# Patient Record
Sex: FEMALE | Race: BLACK OR AFRICAN AMERICAN | NOT HISPANIC OR LATINO | Employment: OTHER | URBAN - METROPOLITAN AREA
[De-identification: names, ages, dates, MRNs, and addresses within clinical notes are randomized per-mention and may not be internally consistent; named-entity substitution may affect disease eponyms.]

---

## 2017-01-03 ENCOUNTER — HOSPITAL ENCOUNTER (OUTPATIENT)
Dept: RADIOLOGY | Facility: HOSPITAL | Age: 77
Discharge: HOME | End: 2017-01-03
Attending: ORTHOPAEDIC SURGERY
Payer: MEDICARE

## 2017-01-03 DIAGNOSIS — G89.29 CHRONIC PAIN OF LEFT ANKLE: ICD-10-CM

## 2017-01-03 DIAGNOSIS — M76.822 POSTERIOR TIBIAL TENDINITIS, LEFT: ICD-10-CM

## 2017-01-03 DIAGNOSIS — M25.572 CHRONIC PAIN OF LEFT ANKLE: ICD-10-CM

## 2017-01-03 DIAGNOSIS — M19.072 PRIMARY OSTEOARTHRITIS OF LEFT FOOT: ICD-10-CM

## 2017-01-03 PROCEDURE — 73718 MRI LOWER EXTREMITY W/O DYE: CPT | Mod: TC,PO,LT

## 2017-01-03 PROCEDURE — 73721 MRI JNT OF LWR EXTRE W/O DYE: CPT | Mod: TC,PO,LT

## 2017-01-03 PROCEDURE — 73721 MRI JNT OF LWR EXTRE W/O DYE: CPT | Mod: 26,LT,, | Performed by: RADIOLOGY

## 2017-01-03 PROCEDURE — 73718 MRI LOWER EXTREMITY W/O DYE: CPT | Mod: 26,LT,, | Performed by: RADIOLOGY

## 2017-01-05 ENCOUNTER — OFFICE VISIT (OUTPATIENT)
Dept: PODIATRY | Facility: CLINIC | Age: 77
End: 2017-01-05
Payer: MEDICARE

## 2017-01-05 VITALS — WEIGHT: 203.06 LBS | DIASTOLIC BLOOD PRESSURE: 74 MMHG | HEART RATE: 74 BPM | SYSTOLIC BLOOD PRESSURE: 136 MMHG

## 2017-01-05 DIAGNOSIS — B35.9 DERMATOPHYTOSIS: ICD-10-CM

## 2017-01-05 DIAGNOSIS — E11.9 COMPREHENSIVE DIABETIC FOOT EXAMINATION, TYPE 2 DM, ENCOUNTER FOR: ICD-10-CM

## 2017-01-05 DIAGNOSIS — S82.839A AVULSION FRACTURE OF DISTAL FIBULA: ICD-10-CM

## 2017-01-05 DIAGNOSIS — M76.822 POSTERIOR TIBIAL TENDONITIS, LEFT: Primary | ICD-10-CM

## 2017-01-05 DIAGNOSIS — I77.1 ARTERIAL INSUFFICIENCY: ICD-10-CM

## 2017-01-05 PROCEDURE — 99203 OFFICE O/P NEW LOW 30 MIN: CPT | Mod: 25,S$PBB,, | Performed by: PODIATRIST

## 2017-01-05 PROCEDURE — 99214 OFFICE O/P EST MOD 30 MIN: CPT | Mod: PBBFAC,PO | Performed by: PODIATRIST

## 2017-01-05 PROCEDURE — 99999 PR PBB SHADOW E&M-EST. PATIENT-LVL IV: CPT | Mod: PBBFAC,,, | Performed by: PODIATRIST

## 2017-01-05 PROCEDURE — 11721 DEBRIDE NAIL 6 OR MORE: CPT | Mod: Q8,PBBFAC,PO | Performed by: PODIATRIST

## 2017-01-05 PROCEDURE — 11721 DEBRIDE NAIL 6 OR MORE: CPT | Mod: Q8,S$PBB,, | Performed by: PODIATRIST

## 2017-01-05 NOTE — MR AVS SNAPSHOT
[facility] - [facility]  [address] [place] [address]  Phone: [phone]  Fax: [phone]                  Jennifer Zhu   2017 3:00 PM   Office Visit    Description:  Female : 1940   Provider:  Selena Bingham DPM   Department:  [facility] - Podiatry           Reason for Visit     Ankle Pain           Diagnoses this Visit        Comments    Posterior tibial tendonitis, left    -  Primary     Avulsion fracture of distal fibula                To Do List           Goals (5 Years of Data)     None      [facility]     [facility] -  Assistance  Registered nurses in the [facility] provide clinical advisement, health education, appointment booking, and other advisory services.  Call for this free service at [phone].             Medications           Message regarding Medications     Verify the changes and/or additions to your medication regime listed below are the same as discussed with your clinician today.  If any of these changes or additions are incorrect, please notify your healthcare provider.             Verify that the below list of medications is an accurate representation of the medications you are currently taking.  If none reported, the list may be blank. If incorrect, please contact your healthcare provider. Carry this list with you in case of emergency.           Current Medications     amlodipine (NORVASC) 5 MG tablet Take 5 mg by mouth.    aspirin 325 MG tablet Take 325 mg by mouth.    blood sugar diagnostic (ONETOUCH ULTRA TEST) Strp 1 boxes by Misc.(Non-Drug; Combo Route) route daily.    clopidogrel (PLAVIX) 75 mg tablet Take 75 mg by mouth.    diclofenac sodium (VOLTAREN) 1 % Gel Apply 2 g topically once daily.    ESTRACE 0.01 % (0.1 mg/gram) vaginal cream USE AS DIRECTED VAGINALLY ONE TO TWO TIMES PER WEEK AS DIRECTED    fluticasone (FLONASE) 50 mcg/actuation nasal spray SHAKE WELL USE 1 SQUIRT IN EACH NOSTRIL ONCE DAILY FOR NASAL  ALLERGY SYMPTOMS    furosemide (LASIX) 20 MG tablet Take 1-2 tablet by mouth  per day for fluid and blood pressure    lancets (ONETOUCH ULTRASOFT LANCETS) Misc Check BS bid    loratadine (CLARITIN) 10 mg tablet Take 10 mg by mouth.    metformin (GLUCOPHAGE) 500 MG tablet Take 500 mg by mouth.    metoprolol succinate (TOPROL-XL) 25 MG 24 hr tablet Take 1/2 tab po Bid.    nitroGLYCERIN (NITROSTAT) 0.4 MG SL tablet Place 0.4 mg under the tongue.    omeprazole (PRILOSEC) 20 MG capsule Take 20 mg by mouth.    peg 400-propylene glycol, PF, 0.4-0.3 % Dpet Apply 1 drop to eye.    polyethylene glycol (GLYCOLAX) 17 gram PwPk Take 17 g by mouth.    potassium chloride (KLOR-CON) 10 MEQ TbSR TAKE ONE (1) TABLET BY MOUTH TWICE A DAY WITH A FULL GLASS OF WATER FOR POTASSIUM    simvastatin (ZOCOR) 20 MG tablet Take 20 mg by mouth.    travoprost (TRAVATAN Z) 0.004 % Drop     travoprost, benzalkonium, (TRAVATAN) 0.004 % ophthalmic solution Apply 1 drop to eye.    triamcinolone acetonide 0.1% (KENALOG) 0.1 % ointment Apply 1 applicator topically.           Clinical Reference Information           Vital Signs - Last Recorded  Most recent update: 1/5/2017  3:25 PM by Yumi Youngblood MA    BP Pulse Wt             136/74 (BP Location: Left arm, Patient Position: Sitting, BP Method: Automatic) 74 92.1 kg (203 lb 0.7 oz)         Blood Pressure          Most Recent Value    BP  136/74      Allergies as of 1/5/2017     Lisinopril    Penicillins    Sulfa (Sulfonamide Antibiotics)      Immunizations Administered on Date of Encounter - 1/5/2017     None      Orders Placed During Today's Visit      Normal Orders This Visit    Ambulatory Referral to Physical/Occupational Therapy

## 2017-01-05 NOTE — LETTER
January 5, 2017      Terrie Escobar PA-C  [address] [place] [address]           [facility] - Podiatry  [address] [place] [address]  Phone: [phone]  Fax: [phone]          Patient: Jennifer Zhu   MR Number: [id number]   YOB: 1940   Date of Visit: 1/5/2017       Dear Terrie Ecsobar:    Thank you for referring Jennifer Zhu to me for evaluation. Attached you will find relevant portions of my assessment and plan of care.    If you have questions, please do not hesitate to call me. I look forward to following Jennifer Zhu along with you.    Sincerely,    Selena Bingham DPM    Enclosure  CC:  No Recipients    If you would like to receive this communication electronically, please contact [email or web address] or [phone] to request more information on [facility] access.    For providers and/or their staff who would like to refer a patient to [facility], please contact us through our one-stop-shop provider referral line, [facility], at [phone].    If you feel you have received this communication in error or would no longer like to receive these types of communications, please e-mail [email or web address]

## 2017-03-13 NOTE — PROGRESS NOTES
PODIATRY NOTE  PCP: Dr. Rod Saez MD    CHIEF COMPLAINT   Chief Complaint   Patient presents with    Ankle Pain     left ankle and foot pain with swelling MRI Review xrays prior       HPI  Jennifer Zhu is a 76 y.o. female who has a past medical history of Cataract; Diabetes mellitus, type 2; Hyperlipidemia; and Hypertension.   Jennifer presents to clinic today complaining of left ankle pain. Pt was seen by orthopedic department and MRI and foot xray ordered consistent with tendonitis. She states she still has some pain at the site however topical antiinflammatory gel has been helping. She is ambulatory with use of cane. She rates pain 7/10 at worst. She normally wears SAS shoes. She has purchased recommend orthotics. She states symptoms have been present since she had a fall in October 2016 resulting in avulsion fracture of lateral malleolus. Her pain is intermittent but is improved with use of functional orthoses/.    She has compression stockings and [facility] She also complains about thickened elongated toenails. She is a controlled DM2 and states routine at risk foot care was performed by another podiatrists. She request nail carae.     Patient denies other pedal complaints at this time.      PMH  Past Medical History   Diagnosis Date    Cataract     Diabetes mellitus, type 2     Hyperlipidemia     Hypertension        PROBLEM LIST  There are no active problems to display for this patient.      MEDS  Current Outpatient Prescriptions on File Prior to Visit   Medication Sig Dispense Refill    amlodipine (NORVASC) 5 MG tablet Take 5 mg by mouth.      aspirin 325 MG tablet Take 325 mg by mouth.      blood sugar diagnostic (ONETOUCH ULTRA TEST) Strp 1 boxes by Misc.(Non-Drug; Combo Route) route daily.      clopidogrel (PLAVIX) 75 mg tablet Take 75 mg by mouth.      diclofenac sodium (VOLTAREN) 1 % Gel Apply 2 g topically once daily. 2 Tube  2    ESTRACE 0.01 % (0.1 mg/gram) vaginal cream USE AS DIRECTED VAGINALLY ONE TO TWO TIMES PER WEEK AS DIRECTED  6    fluticasone (FLONASE) 50 mcg/actuation nasal spray SHAKE WELL USE 1 SQUIRT IN EACH NOSTRIL ONCE DAILY FOR NASAL ALLERGY SYMPTOMS  11    furosemide (LASIX) 20 MG tablet Take 1-2 tablet by mouth  per day for fluid and blood pressure      lancets (ONETOUCH ULTRASOFT LANCETS) Misc Check BS bid      loratadine (CLARITIN) 10 mg tablet Take 10 mg by mouth.      metformin (GLUCOPHAGE) 500 MG tablet Take 500 mg by mouth.      metoprolol succinate (TOPROL-XL) 25 MG 24 hr tablet Take 1/2 tab po Bid.      nitroGLYCERIN (NITROSTAT) 0.4 MG SL tablet Place 0.4 mg under the tongue.      omeprazole (PRILOSEC) 20 MG capsule Take 20 mg by mouth.      peg 400-propylene glycol, PF, 0.4-0.3 % Dpet Apply 1 drop to eye.      polyethylene glycol (GLYCOLAX) 17 gram PwPk Take 17 g by mouth.      potassium chloride (KLOR-CON) 10 MEQ TbSR TAKE ONE (1) TABLET BY MOUTH TWICE A DAY WITH A FULL GLASS OF WATER FOR POTASSIUM  6    simvastatin (ZOCOR) 20 MG tablet Take 20 mg by mouth.      travoprost (TRAVATAN Z) 0.004 % Drop       travoprost, benzalkonium, (TRAVATAN) 0.004 % ophthalmic solution Apply 1 drop to eye.      triamcinolone acetonide 0.1% (KENALOG) 0.1 % ointment Apply 1 applicator topically.       No current facility-administered medications on file prior to visit.        PSH     Past Surgical History   Procedure Laterality Date    Cardiac surgery       Triple by pass    Intraocular lens insertion       left eye    Bladder lift          ALL  Review of patient's allergies indicates:   Allergen Reactions    Lisinopril Swelling     Lips swelling    Penicillins Anaphylaxis    Sulfa (sulfonamide antibiotics) Hives       SOC     Social History   Substance Use Topics    Smoking status: Never Smoker    Smokeless tobacco: Not on file    Alcohol use No         FAMILY HX  No family history on file.          REVIEW OF SYSTEMS  General: Denies any fever or chills  Chest: Denies shortness of breath, wheezing, coughing, or sputum production  Heart: Denies chest pain, cold extremities, orthopenia, or reduced exercise tolerance  As noted above and per history of current illness above, otherwise negative in the remainder of the 14 systems.     PHYSICAL EXAM  Vitals:    01/05/17 1523   BP: 136/74   Pulse: 74   Weight: 92.1 kg (203 lb 0.7 oz)   PainSc:   7   PainLoc: Ankle       General: This patient is well-developed, well-nourished and appears stated age, well-oriented to person, place and time, and cooperative and pleasant on today's visit      LOWER EXTREMITY  Vascular exam:   · Dorsalis pedis and posterior tibial pulses palpable 1/4 bilaterally.   · Capillary refill time immediate to the toes.   · Feet are warm to the touch. Skin temperature warm to warm from proximally to distally   · There are no varicosities, telangiectasias noted to bilateral foot and ankle regions.   · There are no ecchymoses noted to bilateral foot and ankle regions.   · There is gross lower extremity edema LEFT>right     Dermatologic exam:   · Skin moist with healthy texture and turgor.  · There are no open ulcerations, lacerations, or fissures to bilateral foot and ankle regions. There are no signs of infection as there is no erythema, no proximal-extending lymphangiitis, no fluctuance, or crepitus noted on palpation to bilateral foot and ankle regions.   · There is no interdigital maceration.   · There are hyperkeratotic lesions noted to feet. Nails are thickened elongated x 10.    Neurologic exam:  · Epicritic sensation is intact as the patient is able to sense light touch to bilateral foot and ankle regions.   · Achilles and patellar deep tendon reflexes intact  · Babinski reflex absent    Musculoskeletal/Orthopedic exam:   · No symptomatic structural abnormalities noted  · There is tenderness with palpation lateral malleolus and along  posterior tibial tendon LEFT  · There is MAXIMALLY pronated foot type with RCSP eversion noted  · Muscle strength AT/EHL/EDL/PT: 5/5; Achilles/Gastroc/Soleus: 5/5; PB/PL: 5/5 Muscle tone is normal.  · Ankle joint ROM  B/L supple DF/PF, non-crepitus    IMAGING   Reviewed by me and I agree with radiologist findings, 3 views of foot/ankle, reveal:    Results for orders placed during the hospital encounter of 10/26/16   X-Ray Foot Complete Left    Narrative Comparison: None     LEFT foot 3 views  LEFT ankle 3 views    Findings:    Diffuse soft tissue swelling of the ankle and foot.  No radiopaque foreign body, significant calcification or subcutaneous air identified.  Pes planus, calcaneal spurs and degenerative changes throughout the ankle and foot.  Extensive that generalized osteopenia.  Mild lucency at the base of the first metatarsal without discrete bony cortical erosion.  No periosteal reaction.  Ankle mortise is well-maintained.  Irregularity identified along the distal margins of the medial and lateral malleoli.  Small irregular calcific density projects along the inferior margin of the lateral malleolus and equivocal air lucency noted raising the possibility of fracture.    Impression       Diffuse soft tissue swelling and abnormal appearance the lateral malleolus raising possibility of distal fibular fracture.    Additional abnormalities throughout the ankle and foot as above.      Electronically signed by: COLIN BARR III, MD  Date:     10/26/16  Time:    17:13         MRI:  Impression         1.  Posterior tibial tenosynovitis and peroneus longus tendinitis.    2.  Healing, minimally displaced avulsion fracture distal fibula.    3.  Thickened and edematous appearance of the ATF, PTF and PCF without complete tear.    4.  Mild thickening and increased signal of the posterior tibial tendon at its navicular insertion without complete tear.  This can be seen as normal variant.    5.  Additional incidental  findings as above.           ASSESSMENT  1. Posterior tibial tendonitis, left  Ambulatory Referral to Physical/Occupational Therapy   2. Avulsion fracture of distal fibula  Ambulatory Referral to Physical/Occupational Therapy   3. Dermatophytosis     4. Arterial insufficiency     5. Comprehensive diabetic foot examination, type 2 DM, encounter for           PLAN    1. Patient was educated about clinical and imaging findings, and verbalizes understanding of above.  2. Treatment plan: Patient was educated and counseled regarding tibial tendinitis. At this time, the patient agreed to proceed with conservative treatment for relative support with orthoses and tennis shoes. She has had CAM walking boot and lace up ankle brace s/p avulsion fracture. She will benefit from physical therapy. Referral made to [facility] as daughter requests Saturday appointments    -With patient's permission, nails were aggressively reduced and debrided x 10 to their soft tissue attachment mechanically and with electric , removing all offending nail and debris. Patient relates relief following the procedure.   3. RTC  for follow up/evaluation, or sooner if any issues, questions or concerns.    Report Electronically Signed By:  Selena Bingham DPM   Podiatric Medicine & Surgery  [facility]  1/5/2017                 0

## 2017-05-25 ENCOUNTER — OFFICE VISIT (OUTPATIENT)
Dept: PODIATRY | Facility: CLINIC | Age: 77
End: 2017-05-25
Payer: MEDICARE

## 2017-05-25 VITALS — WEIGHT: 203.06 LBS | HEART RATE: 75 BPM | DIASTOLIC BLOOD PRESSURE: 66 MMHG | SYSTOLIC BLOOD PRESSURE: 139 MMHG

## 2017-05-25 DIAGNOSIS — E11.9 COMPREHENSIVE DIABETIC FOOT EXAMINATION, TYPE 2 DM, ENCOUNTER FOR: ICD-10-CM

## 2017-05-25 DIAGNOSIS — I77.1 ARTERIAL INSUFFICIENCY: ICD-10-CM

## 2017-05-25 DIAGNOSIS — B35.9 DERMATOPHYTOSIS: Primary | ICD-10-CM

## 2017-05-25 PROCEDURE — 11721 DEBRIDE NAIL 6 OR MORE: CPT | Mod: Q8,S$PBB,, | Performed by: PODIATRIST

## 2017-05-25 PROCEDURE — 99213 OFFICE O/P EST LOW 20 MIN: CPT | Mod: 25,S$PBB,, | Performed by: PODIATRIST

## 2017-05-25 PROCEDURE — 11721 DEBRIDE NAIL 6 OR MORE: CPT | Mod: Q8,PBBFAC,PO | Performed by: PODIATRIST

## 2017-05-25 PROCEDURE — 99214 OFFICE O/P EST MOD 30 MIN: CPT | Mod: PBBFAC,PO | Performed by: PODIATRIST

## 2017-05-25 PROCEDURE — 99999 PR PBB SHADOW E&M-EST. PATIENT-LVL IV: CPT | Mod: PBBFAC,,, | Performed by: PODIATRIST

## 2017-05-25 NOTE — PROGRESS NOTES
Dictation #1  MRN:[id number]  CSN:[id number]  PODIATRY NOTE  PCP: Dr. Rod Saez MD    CHIEF COMPLAINT   Chief Complaint   Patient presents with    Diabetes Mellitus     diabetic nail care PCP Dr. Saez March 12 2017       HPI  Jennifer Zhu is a 76 y.o. female who has a past medical history of Cataract; Diabetes mellitus, type 2; Hyperlipidemia; and Hypertension.   Jennifer presents to clinic today for at risk nail care. Pt complains about thickened elongated painful toenails. She staest relief is obtained with nail trimming.  Patient denies other pedal complaints at this time.    No results found for: HGBA1C    REVIEW OF SYSTEMS  General: Denies any fever or chills  Chest: Denies shortness of breath, wheezing, coughing, or sputum production  Heart: Denies chest pain, cold extremities, orthopenia, or reduced exercise tolerance  As noted above and per history of current illness above, otherwise negative in the remainder of the 14 systems.     PHYSICAL EXAM  Vitals:    05/25/17 1136   BP: 139/66   Pulse: 75   Weight: 92.1 kg (203 lb 0.7 oz)       General: This patient is well-developed, well-nourished and appears stated age, well-oriented to person, place and time, and cooperative and pleasant on today's visit      LOWER EXTREMITY  Vascular exam:   · Dorsalis pedis and posterior tibial pulses palpable 1/4 bilaterally.   · Capillary refill time immediate to the toes.   · Feet are warm to the touch. Skin temperature warm to warm from proximally to distally   · There are no varicosities, telangiectasias noted to bilateral foot and ankle regions.   · There are no ecchymoses noted to bilateral foot and ankle regions.   · There is gross lower extremity edema LEFT>right     Dermatologic exam:   · Skin moist with healthy texture and turgor.  · There are no open ulcerations, lacerations, or fissures to bilateral foot and ankle regions. There are no signs of infection as there is no erythema, no proximal-extending  lymphangiitis, no fluctuance, or crepitus noted on palpation to bilateral foot and ankle regions.   · There is no interdigital maceration.   · There are hyperkeratotic lesions noted to feet. Nails are thickened elongated x 10.    Neurologic exam:  · Epicritic sensation is intact as the patient is able to sense light touch to bilateral foot and ankle regions.   · Achilles and patellar deep tendon reflexes intact  · Babinski reflex absent    Musculoskeletal/Orthopedic exam:   · No symptomatic structural abnormalities noted  · There is tenderness with palpation lateral malleolus and along posterior tibial tendon LEFT  · There is MAXIMALLY pronated foot type with RCSP eversion noted  · Muscle strength AT/EHL/EDL/PT: 5/5; Achilles/Gastroc/Soleus: 5/5; PB/PL: 5/5 Muscle tone is normal.  · Ankle joint ROM  B/L supple DF/PF, non-crepitus        ASSESSMENT  1. Dermatophytosis     2. Arterial insufficiency     3. Comprehensive diabetic foot examination, type 2 DM, encounter for           PLAN    1. Patient was educated about clinical and imaging findings, and verbalizes understanding of above.  2. With patient's permission, nails were aggressively reduced and debrided x 10 to their soft tissue attachment mechanically and with electric , removing all offending nail and debris. Patient relates relief following the procedure.     Report Electronically Signed By:  Selena Bingham DPM   Podiatric Medicine & Surgery  [facility]  5/30/2017

## 2017-10-24 ENCOUNTER — OFFICE VISIT (OUTPATIENT)
Dept: PODIATRY | Facility: CLINIC | Age: 77
End: 2017-10-24
Payer: MEDICARE

## 2017-10-24 VITALS
SYSTOLIC BLOOD PRESSURE: 128 MMHG | BODY MASS INDEX: 35.85 KG/M2 | WEIGHT: 215.19 LBS | HEART RATE: 65 BPM | HEIGHT: 65 IN | DIASTOLIC BLOOD PRESSURE: 68 MMHG

## 2017-10-24 DIAGNOSIS — I77.1 ARTERIAL INSUFFICIENCY: ICD-10-CM

## 2017-10-24 DIAGNOSIS — B35.9 DERMATOPHYTOSIS: Primary | ICD-10-CM

## 2017-10-24 DIAGNOSIS — L85.3 XEROSIS CUTIS: ICD-10-CM

## 2017-10-24 PROCEDURE — 11721 DEBRIDE NAIL 6 OR MORE: CPT | Mod: Q8,PBBFAC,PO | Performed by: PODIATRIST

## 2017-10-24 PROCEDURE — 99213 OFFICE O/P EST LOW 20 MIN: CPT | Mod: S$PBB,25,, | Performed by: PODIATRIST

## 2017-10-24 PROCEDURE — 99214 OFFICE O/P EST MOD 30 MIN: CPT | Mod: PBBFAC,PO | Performed by: PODIATRIST

## 2017-10-24 PROCEDURE — 99999 PR PBB SHADOW E&M-EST. PATIENT-LVL IV: CPT | Mod: PBBFAC,,, | Performed by: PODIATRIST

## 2017-10-24 PROCEDURE — 11721 DEBRIDE NAIL 6 OR MORE: CPT | Mod: Q8,S$PBB,, | Performed by: PODIATRIST

## 2017-10-24 NOTE — PROGRESS NOTES
"Dictation #1  MRN:[id number]  CSN:[id number]  PODIATRY NOTE  PCP: Dr. Gómez Najera MD, MD    CHIEF COMPLAINT   Chief Complaint   Patient presents with    Routine Foot Care     at risk foot/nail care PCP Dr. Gómez Najera 8/23/2017       HPI  Jennifer Zhu is a 76 y.o. female who has a past medical history of Cataract; Diabetes mellitus, type 2; Hyperlipidemia; and Hypertension.  Jennifer presents to clinic today for at risk nail care. Pt complains about thickened elongated painful toenails. She staest relief is obtained with nail trimming. Daughter complains about dry skin. She is using Eucerin and aloe vera to help.  Patient denies other pedal complaints at this time.    No results found for: HGBA1C    REVIEW OF SYSTEMS  General: Denies any fever or chills  Chest: Denies shortness of breath, wheezing, coughing, or sputum production  Heart: Denies chest pain, cold extremities, orthopenia, or reduced exercise tolerance  As noted above and per history of current illness above, otherwise negative in the remainder of the 14 systems.     PHYSICAL EXAM  Vitals:    10/24/17 0908   BP: 128/68   Pulse: 65   Weight: 97.6 kg (215 lb 2.7 oz)   Height: 5' 5" (1.651 m)       General: This patient is well-developed, well-nourished and appears stated age, well-oriented to person, place and time, and cooperative and pleasant on today's visit      LOWER EXTREMITY  Vascular exam:   · Dorsalis pedis and posterior tibial pulses palpable 1/4 bilaterally.   · Capillary refill time immediate to the toes.   · Feet are warm to the touch. Skin temperature warm to warm from proximally to distally   · There are no varicosities, telangiectasias noted to bilateral foot and ankle regions.   · There are no ecchymoses noted to bilateral foot and ankle regions.   · There is gross lower extremity edema LEFT>right     Dermatologic exam:   · Skin moist with healthy texture and turgor.  · There are no open ulcerations, lacerations, or fissures to bilateral " foot and ankle regions. There are no signs of infection as there is no erythema, no proximal-extending lymphangiitis, no fluctuance, or crepitus noted on palpation to bilateral foot and ankle regions.   · There is no interdigital maceration.   · There are hyperkeratotic lesions noted to feet. Nails are thickened elongated x 10.    Neurologic exam:  · Epicritic sensation is intact as the patient is able to sense light touch to bilateral foot and ankle regions.   · Achilles and patellar deep tendon reflexes intact  · Babinski reflex absent    Musculoskeletal/Orthopedic exam:   · No symptomatic structural abnormalities noted  · There is tenderness with palpation lateral malleolus and along posterior tibial tendon LEFT  · There is MAXIMALLY pronated foot type with RCSP eversion noted  · Muscle strength AT/EHL/EDL/PT: 5/5; Achilles/Gastroc/Soleus: 5/5; PB/PL: 5/5 Muscle tone is normal.  · Ankle joint ROM  B/L supple DF/PF, non-crepitus        ASSESSMENT  1. Dermatophytosis     2. Arterial insufficiency     3. Xerosis cutis           PLAN    1. Patient was educated about clinical and imaging findings, and verbalizes understanding of above.  2. With patient's permission, nails were aggressively reduced and debrided x 10 to their soft tissue attachment mechanically and with electric , removing all offending nail and debris. Patient relates relief following the procedure.   3. Recommend Amlactin/Eucerin OTC for plantar heel     Report Electronically Signed By:  Selena Bingham DPM   Podiatric Medicine & Surgery  [facility]  10/24/2017

## 2017-11-08 ENCOUNTER — TELEPHONE (OUTPATIENT)
Dept: PODIATRY | Facility: CLINIC | Age: 77
End: 2017-11-08

## 2017-11-08 NOTE — TELEPHONE ENCOUNTER
----- Message from Yumi Youngblood MA sent at 10/24/2017 10:03 AM CDT -----  put in your reminder to call patient ALEJO Keller tomorrow. Dont call today..but inform daughter visits are not covered by Medicare.. she will have to be [facility]- 42.00. I was just informed  Changer her appt date for next visit to [facility].

## 2017-11-08 NOTE — TELEPHONE ENCOUNTER
Left message for patient to return call upon receiving voicemail.    Yumi Youngblood MA  Office of Dr. Selena Bingham DPM   Podiatry Department, [facility]

## 2018-01-23 ENCOUNTER — OFFICE VISIT (OUTPATIENT)
Dept: PODIATRY | Facility: CLINIC | Age: 78
End: 2018-01-23
Payer: MEDICARE

## 2018-01-23 VITALS
HEIGHT: 65 IN | DIASTOLIC BLOOD PRESSURE: 77 MMHG | SYSTOLIC BLOOD PRESSURE: 151 MMHG | BODY MASS INDEX: 37.54 KG/M2 | WEIGHT: 225.31 LBS | HEART RATE: 66 BPM

## 2018-01-23 DIAGNOSIS — B35.9 DERMATOPHYTOSIS: Primary | ICD-10-CM

## 2018-01-23 DIAGNOSIS — I77.1 ARTERIAL INSUFFICIENCY: ICD-10-CM

## 2018-01-23 DIAGNOSIS — L84 CORN OR CALLUS: ICD-10-CM

## 2018-01-23 DIAGNOSIS — E11.9 TYPE 2 DIABETES MELLITUS WITHOUT COMPLICATION, WITHOUT LONG-TERM CURRENT USE OF INSULIN: ICD-10-CM

## 2018-01-23 DIAGNOSIS — L85.3 XEROSIS OF SKIN: ICD-10-CM

## 2018-01-23 PROCEDURE — 99999 PR PBB SHADOW E&M-EST. PATIENT-LVL IV: CPT | Mod: PBBFAC,,, | Performed by: PODIATRIST

## 2018-01-23 PROCEDURE — 11055 PARING/CUTG B9 HYPRKER LES 1: CPT | Mod: Q8,PBBFAC,PO | Performed by: PODIATRIST

## 2018-01-23 PROCEDURE — 11721 DEBRIDE NAIL 6 OR MORE: CPT | Mod: 59,Q8,S$PBB, | Performed by: PODIATRIST

## 2018-01-23 PROCEDURE — 99213 OFFICE O/P EST LOW 20 MIN: CPT | Mod: 25,S$PBB,, | Performed by: PODIATRIST

## 2018-01-23 PROCEDURE — 11055 PARING/CUTG B9 HYPRKER LES 1: CPT | Mod: Q8,S$PBB,, | Performed by: PODIATRIST

## 2018-01-23 PROCEDURE — 99214 OFFICE O/P EST MOD 30 MIN: CPT | Mod: PBBFAC,PO | Performed by: PODIATRIST

## 2018-01-23 PROCEDURE — 11721 DEBRIDE NAIL 6 OR MORE: CPT | Performed by: PODIATRIST

## 2018-01-23 NOTE — PROGRESS NOTES
"PODIATRY NOTE  PCP: Dr. Gómez Najera MD, MD (Inactive)    CHIEF COMPLAINT   Chief Complaint   Patient presents with    Routine Foot Care     Dm2 Last visit with PCP Dr. Najera 11/2017       HPI  Jennifer Zhu is a 77 y.o. female who has a past medical history of Cataract; Diabetes mellitus, type 2; Hyperlipidemia; and Hypertension.  Jennifer presents to clinic today for at risk nail care. Pt complains about thickened elongated painful toenails. She states relief is obtained with nail trimming.  Patient denies other pedal complaints at this time.    No results found for: HGBA1C    REVIEW OF SYSTEMS  General: Denies any fever or chills  Chest: Denies shortness of breath, wheezing, coughing, or sputum production  Heart: Denies chest pain, cold extremities, orthopenia, or reduced exercise tolerance  As noted above and per history of current illness above, otherwise negative in the remainder of the 14 systems.     PHYSICAL EXAM  Vitals:    01/23/18 1023   BP: (!) 151/77   Pulse: 66   Weight: 102.2 kg (225 lb 5 oz)   Height: 5' 5" (1.651 m)   PainSc: 0-No pain       General: This patient is well-developed, well-nourished and appears stated age, well-oriented to person, place and time, and cooperative and pleasant on today's visit      LOWER EXTREMITY  Vascular exam:   · Dorsalis pedis and posterior tibial pulses palpable 1/4 bilaterally.   · Capillary refill time immediate to the toes.   · Feet are warm to the touch. Skin temperature warm to warm from proximally to distally   · There are no varicosities, telangiectasias noted to bilateral foot and ankle regions.   · There are no ecchymoses noted to bilateral foot and ankle regions.   · There is gross lower extremity edema LEFT>right     Dermatologic exam:   · Skin moist with healthy texture and turgor.  · There are no open ulcerations, lacerations, or fissures to bilateral foot and ankle regions. There are no signs of infection as there is no erythema, no " proximal-extending lymphangiitis, no fluctuance, or crepitus noted on palpation to bilateral foot and ankle regions.   · There is no interdigital maceration.   · There are hyperkeratotic lesions noted sub 1st metatarsal LEFT. Nails are thickened elongated x 10.    Neurologic exam:  · Epicritic sensation is intact as the patient is able to sense light touch to bilateral foot and ankle regions.   · Achilles and patellar deep tendon reflexes intact  · Babinski reflex absent    Musculoskeletal/Orthopedic exam:   · No symptomatic structural abnormalities noted  · There is tenderness with palpation lateral malleolus and along posterior tibial tendon LEFT  · There is MAXIMALLY pronated foot type with RCSP eversion noted  · Muscle strength AT/EHL/EDL/PT: 5/5; Achilles/Gastroc/Soleus: 5/5; PB/PL: 5/5 Muscle tone is normal.  · Ankle joint ROM  B/L supple DF/PF, non-crepitus        ASSESSMENT  Dermatophytosis    Arterial insufficiency  -     DIABETIC SHOES FOR HOME USE    Type 2 diabetes mellitus without complication, without long-term current use of insulin  -     DIABETIC SHOES FOR HOME USE    Xerosis of skin  -     DIABETIC SHOES FOR HOME USE    Corn or callus          PLAN    1. Patient was educated about clinical and imaging findings, and verbalizes understanding of above.  2. With patient's permission, nails were aggressively reduced and debrided x 10 to their soft tissue attachment mechanically and with electric , removing all offending nail and debris. Patient relates relief following the procedure.  -With patient's permission via verbal consent, the involved area was cleansed with an alcohol swab. Trimming of hyperkeratotic lesions deep to epidermal layer x 1 LEFT was performed with a #15 blade without incident. Patient relates relief following the procedure. Patient will continue to monitor the areas daily, inspect feet, wear protective shoe gear when ambulatory, moisturizer to maintain skin  integrity.    Report Electronically Signed By:  Selena De La Rosa DPM   Podiatric Medicine & Surgery  [facility]  1/23/2018  3:14 PM

## 2018-01-29 DIAGNOSIS — M25.532 LEFT WRIST PAIN: Primary | ICD-10-CM

## 2018-01-31 ENCOUNTER — HOSPITAL ENCOUNTER (OUTPATIENT)
Dept: RADIOLOGY | Facility: HOSPITAL | Age: 78
Discharge: HOME | End: 2018-01-31
Attending: PHYSICIAN ASSISTANT
Payer: MEDICARE

## 2018-01-31 ENCOUNTER — OFFICE VISIT (OUTPATIENT)
Dept: ORTHOPEDICS | Facility: CLINIC | Age: 78
End: 2018-01-31
Payer: MEDICARE

## 2018-01-31 VITALS
RESPIRATION RATE: 15 BRPM | DIASTOLIC BLOOD PRESSURE: 72 MMHG | HEIGHT: 65 IN | WEIGHT: 225.31 LBS | BODY MASS INDEX: 37.54 KG/M2 | HEART RATE: 71 BPM | SYSTOLIC BLOOD PRESSURE: 143 MMHG

## 2018-01-31 DIAGNOSIS — M19.042 PRIMARY OSTEOARTHRITIS OF LEFT HAND: ICD-10-CM

## 2018-01-31 DIAGNOSIS — M25.532 LEFT WRIST PAIN: ICD-10-CM

## 2018-01-31 DIAGNOSIS — M25.832 MASS OF JOINT OF LEFT WRIST: Primary | ICD-10-CM

## 2018-01-31 PROCEDURE — 73110 X-RAY EXAM OF WRIST: CPT | Mod: TC,FY,PO,LT

## 2018-01-31 PROCEDURE — 99213 OFFICE O/P EST LOW 20 MIN: CPT | Mod: S$PBB,,, | Performed by: PHYSICIAN ASSISTANT

## 2018-01-31 PROCEDURE — 73110 X-RAY EXAM OF WRIST: CPT | Mod: 26,LT,, | Performed by: RADIOLOGY

## 2018-01-31 PROCEDURE — 1126F AMNT PAIN NOTED NONE PRSNT: CPT | Mod: ,,, | Performed by: PHYSICIAN ASSISTANT

## 2018-01-31 PROCEDURE — 1159F MED LIST DOCD IN RCRD: CPT | Mod: ,,, | Performed by: PHYSICIAN ASSISTANT

## 2018-01-31 PROCEDURE — 99214 OFFICE O/P EST MOD 30 MIN: CPT | Mod: PBBFAC,25,PO | Performed by: PHYSICIAN ASSISTANT

## 2018-01-31 PROCEDURE — 99999 PR PBB SHADOW E&M-EST. PATIENT-LVL IV: CPT | Mod: PBBFAC,,, | Performed by: PHYSICIAN ASSISTANT

## 2018-01-31 NOTE — PROGRESS NOTES
Patient ID: Jennifer Zhu is a 77 y.o. female.    Chief Complaint: Pain of the Left Wrist      HPI: Jennifer Zhu  is a 77 y.o. female who c/o Pain of the Left Wrist   for duration of one month.  She denies an inciting injury.  Pain level is 0 out of 10.  He does have pain in the left hand intermittently, but never in the wrist.  Alleviating factors include nothing.  Aggravating factors include nothing.  She denies associated numbness and tingling.  She complains of swelling in the dorsal wrist.        Objective:        General    Nursing note and vitals reviewed.  Constitutional: She is oriented to person, place, and time. She appears well-developed and well-nourished.   HENT:   Head: Normocephalic and atraumatic.   Eyes: EOM are normal.   Cardiovascular: Normal rate and regular rhythm.    Pulmonary/Chest: Effort normal.   Abdominal: Soft.   Neurological: She is alert and oriented to person, place, and time.   Psychiatric: She has a normal mood and affect. Her behavior is normal.         Left Hand/Wrist Exam     Inspection   Scars: Wrist - absent   Effusion: Wrist - absent   Bruising: Wrist - absent   Deformity: Wrist - absent     Range of Motion     Wrist   Extension: normal   Flexion: normal   Pronation: normal   Supination: normal     Tests   Phalens Sign: negative  Tinels Sign (Medial Nerve): negative  Finkelstein: negative    Atrophy  Thenar:  positive  Hypothenar:  positive  Intrinsic: positive  1st Dorsal Interosseous:  positive    Other     Sensory Exam  Median Distribution: normal  Ulnar Distribution: normal  Radial Distribution: normal    Comments:  2+ radial pulse.  Small firm mass dorsal wrist radially; non tender, mobile, non pulsatile, no erythema, no purulence.  Measures 0.5cm x 0.75cm.          Muscle Strength   Right Upper Extremity   Wrist Extension: 5/5/5   Wrist Flexion: 5/5/5   : 4/5/5   Left Upper Extremity  Wrist Extension: 5/5/5   Wrist Flexion: 5/5/5   :  4/5/5     Vascular Exam        Capillary Refill  Left Hand: normal capillary refill            Xray:   Left wrist from today images and report were reviewed today.  I agree with the radiologist's interpretation.  There is no radiographic evidence of acute osseous, articular, or soft tissue abnormality. Mild-to-moderate degenerative is present at the 1st CMC and triscaphe joints.  Mild degenerative findings present at the radiocarpal joint. Carpal alignment is within normal limits. No erosive osseous changes demonstrated.    Assessment:       Encounter Diagnoses   Name Primary?    Mass of joint of left wrist Yes    Primary osteoarthritis of left hand           Plan:       Jennifer was seen today for pain.    Diagnoses and all orders for this visit:    Mass of joint of left wrist    Primary osteoarthritis of left hand    Ms. Zuh comes in today for new problem of the left wrist as above.  She does have a mass dorsally.  It is firm, but not causing any symptoms at present.  This can be with a ganglion cyst, there is no way for me to 100% sure there is not a malignancy about further workup.  I have offered an MRI of the left wrist for further evaluation.  However, Ms. Zhu like to hold off on that for now.  Should the mass increased in size or become more painful, she'll certainly notify the office and I'll order an MRI at that point.  Additionally, if she chose to have this excised surgically, I would order an MRI for surgical planning.  However, she thinks that the best thing for the present time would be to leave it as is as it is not causing her any symptomatic discomfort.  I have discussed this with Dr. Osorio who agrees.  Patient verbalizes understanding and agrees with the above plan.  Follow-up if symptoms worsen or fail to improve.          The patient understands, chooses and consents to this plan and accepts all   the risks which include but are not limited to the risks mentioned above.     Disclaimer: This note was prepared  using a voice recognition system and is likely to have sound alike errors within the text.

## 2018-04-26 ENCOUNTER — OFFICE VISIT (OUTPATIENT)
Dept: PODIATRY | Facility: CLINIC | Age: 78
End: 2018-04-26
Payer: MEDICARE

## 2018-04-26 VITALS
WEIGHT: 216.06 LBS | SYSTOLIC BLOOD PRESSURE: 125 MMHG | HEART RATE: 81 BPM | BODY MASS INDEX: 36 KG/M2 | DIASTOLIC BLOOD PRESSURE: 70 MMHG | HEIGHT: 65 IN

## 2018-04-26 DIAGNOSIS — I77.1 ARTERIAL INSUFFICIENCY: ICD-10-CM

## 2018-04-26 DIAGNOSIS — L84 CORN OR CALLUS: ICD-10-CM

## 2018-04-26 DIAGNOSIS — B35.9 DERMATOPHYTOSIS: Primary | ICD-10-CM

## 2018-04-26 DIAGNOSIS — E11.9 TYPE 2 DIABETES MELLITUS WITHOUT COMPLICATION, WITHOUT LONG-TERM CURRENT USE OF INSULIN: ICD-10-CM

## 2018-04-26 PROCEDURE — 99214 OFFICE O/P EST MOD 30 MIN: CPT | Mod: PBBFAC,PO | Performed by: PODIATRIST

## 2018-04-26 PROCEDURE — 99499 UNLISTED E&M SERVICE: CPT | Mod: S$PBB,,, | Performed by: PODIATRIST

## 2018-04-26 PROCEDURE — 11721 DEBRIDE NAIL 6 OR MORE: CPT | Mod: 59,Q9,S$PBB, | Performed by: PODIATRIST

## 2018-04-26 PROCEDURE — 11721 DEBRIDE NAIL 6 OR MORE: CPT | Performed by: PODIATRIST

## 2018-04-26 PROCEDURE — 11055 PARING/CUTG B9 HYPRKER LES 1: CPT | Mod: Q9,PBBFAC,PO | Performed by: PODIATRIST

## 2018-04-26 PROCEDURE — 99999 PR PBB SHADOW E&M-EST. PATIENT-LVL IV: CPT | Mod: PBBFAC,,, | Performed by: PODIATRIST

## 2018-04-26 PROCEDURE — 11055 PARING/CUTG B9 HYPRKER LES 1: CPT | Mod: Q9,S$PBB,, | Performed by: PODIATRIST

## 2018-04-26 NOTE — PROGRESS NOTES
"PODIATRY NOTE  PCP: Dr. Gómez Najera MD, MD    CHIEF COMPLAINT   Chief Complaint   Patient presents with    Routine Foot Care     Last visit with PCP Dr. Najera 2/19/18       HPI  Jennifer Zhu is a 77 y.o. female who has a past medical history of Cataract; Diabetes mellitus, type 2; Hyperlipidemia; and Hypertension.  Jennifer presents to clinic today for at risk nail care. Pt complains about thickened elongated painful toenails. She states relief is obtained with nail trimming.  Patient denies other pedal complaints at this time.    No results found for: HGBA1C    REVIEW OF SYSTEMS  General: Denies any fever or chills  Chest: Denies shortness of breath, wheezing, coughing, or sputum production  Heart: Denies chest pain, cold extremities, orthopenia, or reduced exercise tolerance  As noted above and per history of current illness above, otherwise negative in the remainder of the 14 systems.     PHYSICAL EXAM  Vitals:    04/26/18 1045   BP: 125/70   Pulse: 81   Weight: 98 kg (216 lb 0.8 oz)   Height: 5' 5" (1.651 m)   PainSc: 0-No pain       General: This patient is well-developed, well-nourished and appears stated age, well-oriented to person, place and time, and cooperative and pleasant on today's visit      LOWER EXTREMITY  Vascular exam:   · Dorsalis pedis and posterior tibial pulses palpable 1/4 bilaterally.   · Capillary refill time immediate to the toes.   · Feet are warm to the touch. Skin temperature warm to warm from proximally to distally   · There are no varicosities, telangiectasias noted to bilateral foot and ankle regions.   · There are no ecchymoses noted to bilateral foot and ankle regions.   · There is gross lower extremity edema LEFT>right     Dermatologic exam:   · Skin moist with healthy texture and turgor.  · There are no open ulcerations, lacerations, or fissures to bilateral foot and ankle regions. There are no signs of infection as there is no erythema, no proximal-extending lymphangiitis, " no fluctuance, or crepitus noted on palpation to bilateral foot and ankle regions.   · There is no interdigital maceration.   · There are hyperkeratotic lesions noted sub 1st metatarsal LEFT. Nails are thickened elongated x 10.    Neurologic exam:  · Epicritic sensation is intact as the patient is able to sense light touch to bilateral foot and ankle regions.   · Achilles and patellar deep tendon reflexes intact  · Babinski reflex absent    Musculoskeletal/Orthopedic exam:   · No symptomatic structural abnormalities noted  · There is tenderness with palpation lateral malleolus and along posterior tibial tendon LEFT  · There is MAXIMALLY pronated foot type with RCSP eversion noted  · Muscle strength AT/EHL/EDL/PT: 5/5; Achilles/Gastroc/Soleus: 5/5; PB/PL: 5/5 Muscle tone is normal.  · Ankle joint ROM  B/L supple DF/PF, non-crepitus        ASSESSMENT  Dermatophytosis    Arterial insufficiency    Type 2 diabetes mellitus without complication, without long-term current use of insulin          PLAN    1. Patient was educated about clinical and imaging findings, and verbalizes understanding of above.  2. With patient's permission, nails were aggressively reduced and debrided x 10 to their soft tissue attachment mechanically and with electric , removing all offending nail and debris. Patient relates relief following the procedure.  -With patient's permission via verbal consent, the involved area was cleansed with an alcohol swab. Trimming of hyperkeratotic lesions deep to epidermal layer x 1 LEFT was performed with a #15 blade without incident. Patient relates relief following the procedure. Patient will continue to monitor the areas daily, inspect feet, wear protective shoe gear when ambulatory, moisturizer to maintain skin integrity.    Report Electronically Signed By:  Selena De La Rosa DPM   Podiatric Medicine & Surgery  [facility]  4/26/2018  3:14 PM

## 2018-07-31 ENCOUNTER — OFFICE VISIT (OUTPATIENT)
Dept: PODIATRY | Facility: CLINIC | Age: 78
End: 2018-07-31
Payer: MEDICARE

## 2018-07-31 VITALS — BODY MASS INDEX: 36 KG/M2 | HEIGHT: 65 IN | WEIGHT: 216.06 LBS

## 2018-07-31 DIAGNOSIS — B35.9 DERMATOPHYTOSIS: Primary | ICD-10-CM

## 2018-07-31 DIAGNOSIS — L84 CORN OR CALLUS: ICD-10-CM

## 2018-07-31 DIAGNOSIS — I77.1 ARTERIAL INSUFFICIENCY: ICD-10-CM

## 2018-07-31 DIAGNOSIS — E11.9 TYPE 2 DIABETES MELLITUS WITHOUT COMPLICATION, WITHOUT LONG-TERM CURRENT USE OF INSULIN: ICD-10-CM

## 2018-07-31 PROCEDURE — 99999 PR PBB SHADOW E&M-EST. PATIENT-LVL II: CPT | Mod: PBBFAC,,, | Performed by: PODIATRIST

## 2018-07-31 PROCEDURE — 11721 DEBRIDE NAIL 6 OR MORE: CPT | Mod: 59,Q9,S$PBB, | Performed by: PODIATRIST

## 2018-07-31 PROCEDURE — 11055 PARING/CUTG B9 HYPRKER LES 1: CPT | Mod: Q9,PBBFAC,PO | Performed by: PODIATRIST

## 2018-07-31 PROCEDURE — 99499 UNLISTED E&M SERVICE: CPT | Mod: S$PBB,,, | Performed by: PODIATRIST

## 2018-07-31 PROCEDURE — 11721 DEBRIDE NAIL 6 OR MORE: CPT | Mod: Q9,PBBFAC,PO | Performed by: PODIATRIST

## 2018-07-31 PROCEDURE — 99212 OFFICE O/P EST SF 10 MIN: CPT | Mod: PBBFAC,PO | Performed by: PODIATRIST

## 2018-07-31 PROCEDURE — 11055 PARING/CUTG B9 HYPRKER LES 1: CPT | Mod: Q9,S$PBB,, | Performed by: PODIATRIST

## 2018-07-31 RX ORDER — METFORMIN HYDROCHLORIDE 500 MG/1
TABLET ORAL
Refills: 2 | COMMUNITY
Start: 2018-07-02 | End: 2019-01-01 | Stop reason: SDUPTHER

## 2018-07-31 NOTE — PROGRESS NOTES
"PODIATRY NOTE  PCP: Dr. Gómez Najera MD, MD    CHIEF COMPLAINT   Chief Complaint   Patient presents with    Routine Foot Care     Last visit with PCP Dr. Najera 2/19/18       HPI  Jennifer Zhu is a 77 y.o. female who has a past medical history of Cataract; Diabetes mellitus, type 2; Hyperlipidemia; and Hypertension.  Jennifer presents to clinic today for at risk nail care. Pt complains about thickened elongated painful toenails. She states relief is obtained with nail trimming.  Patient denies other pedal complaints at this time.    No results found for: HGBA1C    REVIEW OF SYSTEMS  General: Denies any fever or chills  Chest: Denies shortness of breath, wheezing, coughing, or sputum production  Heart: Denies chest pain, cold extremities, orthopenia, or reduced exercise tolerance  As noted above and per history of current illness above, otherwise negative in the remainder of the 14 systems.     PHYSICAL EXAM  Vitals:    07/31/18 1040   Weight: 98 kg (216 lb 0.8 oz)   Height: 5' 5" (1.651 m)       General: This patient is well-developed, well-nourished and appears stated age, well-oriented to person, place and time, and cooperative and pleasant on today's visit      LOWER EXTREMITY  Vascular exam:   Dorsalis pedis and posterior tibial pulses palpable 1/4 bilaterally.   Capillary refill time immediate to the toes.   Feet are warm to the touch. Skin temperature warm to warm from proximally to distally   There are no varicosities, telangiectasias noted to bilateral foot and ankle regions.   There are no ecchymoses noted to bilateral foot and ankle regions.   There is gross lower extremity edema LEFT>right     Dermatologic exam:   Skin moist with healthy texture and turgor.  There are no open ulcerations, lacerations, or fissures to bilateral foot and ankle regions. There are no signs of infection as there is no erythema, no proximal-extending lymphangiitis, no fluctuance, or crepitus noted on palpation to bilateral " foot and ankle regions.   There is no interdigital maceration.   There are hyperkeratotic lesions noted sub 1st metatarsal LEFT. Nails are thickened elongated x 10.    Neurologic exam:  Epicritic sensation is intact as the patient is able to sense light touch to bilateral foot and ankle regions.   Achilles and patellar deep tendon reflexes intact  Babinski reflex absent    Musculoskeletal/Orthopedic exam:   No symptomatic structural abnormalities noted  There is MAXIMALLY pronated foot type with RCSP eversion noted  Muscle strength AT/EHL/EDL/PT: 5/5; Achilles/Gastroc/Soleus: 5/5; PB/PL: 5/5 Muscle tone is normal.  Ankle joint ROM  B/L supple DF/PF, non-crepitus        ASSESSMENT  Encounter Diagnoses   Name Primary?    Dermatophytosis Yes    Arterial insufficiency     Type 2 diabetes mellitus without complication, without long-term current use of insulin     Corn or callus          PLAN    1. Patient was educated about clinical and imaging findings, and verbalizes understanding of above.  2. With patient's permission, nails were aggressively reduced and debrided x 10 to their soft tissue attachment mechanically and with electric , removing all offending nail and debris. Patient relates relief following the procedure.  -With patient's permission via verbal consent, the involved area was cleansed with an alcohol swab. Trimming of hyperkeratotic lesions deep to epidermal layer x 1 LEFT was performed with a #15 blade without incident. Patient relates relief following the procedure. Patient will continue to monitor the areas daily, inspect feet, wear protective shoe gear when ambulatory, moisturizer to maintain skin integrity.    Report Electronically Signed By:  Selena De La Rosa DPM   Podiatric Medicine & Surgery  [facility]  7/31/2018

## 2018-11-06 ENCOUNTER — OFFICE VISIT (OUTPATIENT)
Dept: PODIATRY | Facility: CLINIC | Age: 78
End: 2018-11-06
Payer: MEDICARE

## 2018-11-06 VITALS
HEIGHT: 65 IN | BODY MASS INDEX: 35.78 KG/M2 | WEIGHT: 214.75 LBS | DIASTOLIC BLOOD PRESSURE: 67 MMHG | SYSTOLIC BLOOD PRESSURE: 132 MMHG | HEART RATE: 69 BPM

## 2018-11-06 DIAGNOSIS — I77.1 ARTERIAL INSUFFICIENCY: ICD-10-CM

## 2018-11-06 DIAGNOSIS — L84 CORN OR CALLUS: ICD-10-CM

## 2018-11-06 DIAGNOSIS — B35.9 DERMATOPHYTOSIS: Primary | ICD-10-CM

## 2018-11-06 DIAGNOSIS — E11.9 TYPE 2 DIABETES MELLITUS WITHOUT COMPLICATION, WITHOUT LONG-TERM CURRENT USE OF INSULIN: ICD-10-CM

## 2018-11-06 PROCEDURE — 11721 DEBRIDE NAIL 6 OR MORE: CPT | Mod: Q9,Q8,PBBFAC,PO,59 | Performed by: PODIATRIST

## 2018-11-06 PROCEDURE — 11055 PARING/CUTG B9 HYPRKER LES 1: CPT | Mod: Q9,Q8,PBBFAC,PO | Performed by: PODIATRIST

## 2018-11-06 PROCEDURE — 99499 UNLISTED E&M SERVICE: CPT | Mod: S$PBB,,, | Performed by: PODIATRIST

## 2018-11-06 PROCEDURE — 11055 PARING/CUTG B9 HYPRKER LES 1: CPT | Mod: Q9,Q8,S$PBB, | Performed by: PODIATRIST

## 2018-11-06 PROCEDURE — 99999 PR PBB SHADOW E&M-EST. PATIENT-LVL III: CPT | Mod: PBBFAC,,, | Performed by: PODIATRIST

## 2018-11-06 PROCEDURE — 99213 OFFICE O/P EST LOW 20 MIN: CPT | Mod: PBBFAC,PO,25 | Performed by: PODIATRIST

## 2018-11-06 PROCEDURE — 11721 DEBRIDE NAIL 6 OR MORE: CPT | Mod: 59,Q9,Q8,S$PBB | Performed by: PODIATRIST

## 2018-11-06 NOTE — PROGRESS NOTES
"PODIATRY NOTE  PCP: Dr. Gómez Najera MD, MD    CHIEF COMPLAINT   Chief Complaint   Patient presents with    Routine Foot Care     PCP Dr Najera 8/20/18; [facility]       HPI  Jennifer Zhu is a 78 y.o. female who has a past medical history of Arthritis, Cataract, Diabetes mellitus, type 2, Hyperlipidemia, and Hypertension.  Jennifer presents to clinic today for at risk nail care. Pt complains about thickened elongated painful toenails. She states relief is obtained with nail trimming.  Patient denies other pedal complaints at this time.    No results found for: HGBA1C    REVIEW OF SYSTEMS  General: Denies any fever or chills  Chest: Denies shortness of breath, wheezing, coughing, or sputum production  Heart: Denies chest pain, cold extremities, orthopenia, or reduced exercise tolerance  As noted above and per history of current illness above, otherwise negative in the remainder of the 14 systems.     PHYSICAL EXAM  Vitals:    11/06/18 0945   BP: 132/67   Pulse: 69   Weight: 97.4 kg (214 lb 11.7 oz)   Height: 5' 5" (1.651 m)       General: This patient is well-developed, well-nourished and appears stated age, well-oriented to person, place and time, and cooperative and pleasant on today's visit      LOWER EXTREMITY  Vascular exam:   Dorsalis pedis and posterior tibial pulses palpable 1/4 bilaterally.   Capillary refill time immediate to the toes.   Feet are warm to the touch. Skin temperature warm to warm from proximally to distally   There are no varicosities, telangiectasias noted to bilateral foot and ankle regions.   There are no ecchymoses noted to bilateral foot and ankle regions.   There is gross lower extremity edema LEFT>right     Dermatologic exam:   Skin moist with healthy texture and turgor.  There are no open ulcerations, lacerations, or fissures to bilateral foot and ankle regions. There are no signs of infection as there is no erythema, no proximal-extending lymphangiitis, no fluctuance, or crepitus noted " on palpation to bilateral foot and ankle regions.   There is no interdigital maceration.   There are hyperkeratotic lesions noted sub 1st metatarsal LEFT. Nails are thickened elongated x 10.    Neurologic exam:  Epicritic sensation is intact as the patient is able to sense light touch to bilateral foot and ankle regions.   Achilles and patellar deep tendon reflexes intact  Babinski reflex absent    Musculoskeletal/Orthopedic exam:   No symptomatic structural abnormalities noted  There is MAXIMALLY pronated foot type with RCSP eversion noted  Muscle strength AT/EHL/EDL/PT: 5/5; Achilles/Gastroc/Soleus: 5/5; PB/PL: 5/5 Muscle tone is normal.  Ankle joint ROM  B/L supple DF/PF, non-crepitus        ASSESSMENT  Encounter Diagnoses   Name Primary?    Dermatophytosis Yes    Arterial insufficiency     Type 2 diabetes mellitus without complication, without long-term current use of insulin     Corn or callus          PLAN    1. Patient was educated about clinical and imaging findings, and verbalizes understanding of above.  2. With patient's permission, nails were aggressively reduced and debrided x 10 to their soft tissue attachment mechanically and with electric , removing all offending nail and debris. Patient relates relief following the procedure.  -With patient's permission via verbal consent, the involved area was cleansed with an alcohol swab. Trimming of hyperkeratotic lesions deep to epidermal layer x 1 LEFT was performed with a #15 blade without incident. Patient relates relief following the procedure. Patient will continue to monitor the areas daily, inspect feet, wear protective shoe gear when ambulatory, moisturizer to maintain skin integrity.    Report Electronically Signed By:  Selena De La Rosa DPM   Podiatric Medicine & Surgery  [facility]  11/6/2018

## 2019-01-01 ENCOUNTER — OFFICE VISIT (OUTPATIENT)
Dept: HEMATOLOGY/ONCOLOGY | Facility: CLINIC | Age: 79
End: 2019-01-01
Payer: MEDICARE

## 2019-01-01 ENCOUNTER — HOSPITAL ENCOUNTER (OUTPATIENT)
Dept: RADIOLOGY | Facility: HOSPITAL | Age: 79
Discharge: HOME | End: 2019-10-17
Attending: NURSE PRACTITIONER
Payer: MEDICARE

## 2019-01-01 ENCOUNTER — TELEPHONE (OUTPATIENT)
Dept: HEMATOLOGY/ONCOLOGY | Facility: CLINIC | Age: 79
End: 2019-01-01

## 2019-01-01 ENCOUNTER — TELEPHONE (OUTPATIENT)
Dept: INTERNAL MEDICINE | Facility: CLINIC | Age: 79
End: 2019-01-01

## 2019-01-01 ENCOUNTER — TELEPHONE (OUTPATIENT)
Dept: RADIOLOGY | Facility: HOSPITAL | Age: 79
End: 2019-01-01

## 2019-01-01 ENCOUNTER — OFFICE VISIT (OUTPATIENT)
Dept: PODIATRY | Facility: CLINIC | Age: 79
End: 2019-01-01
Payer: MEDICARE

## 2019-01-01 ENCOUNTER — SOCIAL WORK (OUTPATIENT)
Dept: HEMATOLOGY/ONCOLOGY | Facility: CLINIC | Age: 79
End: 2019-01-01

## 2019-01-01 ENCOUNTER — DOCUMENTATION ONLY (OUTPATIENT)
Dept: HEMATOLOGY/ONCOLOGY | Facility: CLINIC | Age: 79
End: 2019-01-01

## 2019-01-01 ENCOUNTER — DOCUMENTATION ONLY (OUTPATIENT)
Dept: PHARMACY | Facility: HOSPITAL | Age: 79
End: 2019-01-01

## 2019-01-01 ENCOUNTER — INFUSION (OUTPATIENT)
Dept: INFUSION THERAPY | Facility: HOSPITAL | Age: 79
End: 2019-01-01
Attending: INTERNAL MEDICINE
Payer: MEDICARE

## 2019-01-01 ENCOUNTER — TELEPHONE (OUTPATIENT)
Dept: FAMILY MEDICINE | Facility: CLINIC | Age: 79
End: 2019-01-01

## 2019-01-01 ENCOUNTER — OFFICE VISIT (OUTPATIENT)
Dept: INTERNAL MEDICINE | Facility: CLINIC | Age: 79
End: 2019-01-01
Payer: MEDICARE

## 2019-01-01 ENCOUNTER — HOSPITAL ENCOUNTER (OUTPATIENT)
Dept: RADIOLOGY | Facility: HOSPITAL | Age: 79
Discharge: HOME | End: 2019-11-18
Attending: INTERNAL MEDICINE
Payer: MEDICARE

## 2019-01-01 ENCOUNTER — INITIAL CONSULT (OUTPATIENT)
Dept: GYNECOLOGIC ONCOLOGY | Facility: CLINIC | Age: 79
End: 2019-01-01
Payer: MEDICARE

## 2019-01-01 ENCOUNTER — HOSPITAL ENCOUNTER (INPATIENT)
Facility: HOSPITAL | Age: 79
LOS: 4 days | DRG: 871 | End: 2019-12-20
Attending: EMERGENCY MEDICINE | Admitting: EMERGENCY MEDICINE
Payer: MEDICARE

## 2019-01-01 ENCOUNTER — PATIENT MESSAGE (OUTPATIENT)
Dept: HEMATOLOGY/ONCOLOGY | Facility: CLINIC | Age: 79
End: 2019-01-01

## 2019-01-01 ENCOUNTER — INITIAL CONSULT (OUTPATIENT)
Dept: HEMATOLOGY/ONCOLOGY | Facility: CLINIC | Age: 79
End: 2019-01-01
Payer: MEDICARE

## 2019-01-01 ENCOUNTER — INFUSION (OUTPATIENT)
Dept: INFUSION THERAPY | Facility: HOSPITAL | Age: 79
End: 2019-01-01
Attending: NURSE PRACTITIONER
Payer: MEDICARE

## 2019-01-01 ENCOUNTER — OFFICE VISIT (OUTPATIENT)
Dept: FAMILY MEDICINE | Facility: CLINIC | Age: 79
End: 2019-01-01
Payer: MEDICARE

## 2019-01-01 ENCOUNTER — TELEPHONE (OUTPATIENT)
Dept: PODIATRY | Facility: CLINIC | Age: 79
End: 2019-01-01

## 2019-01-01 ENCOUNTER — HOSPITAL ENCOUNTER (OUTPATIENT)
Dept: RADIOLOGY | Facility: HOSPITAL | Age: 79
Discharge: HOME | End: 2019-11-01
Attending: NURSE PRACTITIONER
Payer: MEDICARE

## 2019-01-01 ENCOUNTER — LAB VISIT (OUTPATIENT)
Dept: LAB | Facility: HOSPITAL | Age: 79
End: 2019-01-01
Attending: INTERNAL MEDICINE
Payer: MEDICARE

## 2019-01-01 ENCOUNTER — NURSE TRIAGE (OUTPATIENT)
Dept: ADMINISTRATIVE | Facility: CLINIC | Age: 79
End: 2019-01-01

## 2019-01-01 VITALS
HEIGHT: 65 IN | WEIGHT: 216.5 LBS | BODY MASS INDEX: 36.07 KG/M2 | SYSTOLIC BLOOD PRESSURE: 146 MMHG | DIASTOLIC BLOOD PRESSURE: 72 MMHG | HEART RATE: 84 BPM

## 2019-01-01 VITALS
HEIGHT: 66 IN | RESPIRATION RATE: 37 BRPM | WEIGHT: 194.38 LBS | SYSTOLIC BLOOD PRESSURE: 157 MMHG | WEIGHT: 197.31 LBS | RESPIRATION RATE: 16 BRPM | SYSTOLIC BLOOD PRESSURE: 135 MMHG | HEIGHT: 66 IN | DIASTOLIC BLOOD PRESSURE: 75 MMHG | BODY MASS INDEX: 31.57 KG/M2 | WEIGHT: 197 LBS | HEART RATE: 98 BPM | TEMPERATURE: 98 F | SYSTOLIC BLOOD PRESSURE: 158 MMHG | OXYGEN SATURATION: 94 % | DIASTOLIC BLOOD PRESSURE: 68 MMHG | HEART RATE: 83 BPM | OXYGEN SATURATION: 99 % | SYSTOLIC BLOOD PRESSURE: 118 MMHG | HEART RATE: 84 BPM | BODY MASS INDEX: 31.66 KG/M2 | DIASTOLIC BLOOD PRESSURE: 87 MMHG | HEIGHT: 66 IN | TEMPERATURE: 98 F | BODY MASS INDEX: 32.39 KG/M2 | WEIGHT: 196.44 LBS | BODY MASS INDEX: 31.71 KG/M2 | DIASTOLIC BLOOD PRESSURE: 85 MMHG | HEIGHT: 65 IN | HEART RATE: 139 BPM

## 2019-01-01 VITALS
HEART RATE: 74 BPM | HEIGHT: 65 IN | BODY MASS INDEX: 36.07 KG/M2 | SYSTOLIC BLOOD PRESSURE: 144 MMHG | DIASTOLIC BLOOD PRESSURE: 78 MMHG | WEIGHT: 216.5 LBS

## 2019-01-01 VITALS
SYSTOLIC BLOOD PRESSURE: 153 MMHG | BODY MASS INDEX: 33.31 KG/M2 | WEIGHT: 206.38 LBS | HEART RATE: 97 BPM | TEMPERATURE: 98 F | OXYGEN SATURATION: 96 % | DIASTOLIC BLOOD PRESSURE: 73 MMHG

## 2019-01-01 VITALS
TEMPERATURE: 98 F | DIASTOLIC BLOOD PRESSURE: 74 MMHG | HEART RATE: 61 BPM | WEIGHT: 203.5 LBS | OXYGEN SATURATION: 97 % | HEIGHT: 66 IN | BODY MASS INDEX: 32.71 KG/M2 | SYSTOLIC BLOOD PRESSURE: 128 MMHG

## 2019-01-01 VITALS
HEIGHT: 65 IN | BODY MASS INDEX: 33.82 KG/M2 | WEIGHT: 203 LBS | OXYGEN SATURATION: 95 % | RESPIRATION RATE: 16 BRPM | HEART RATE: 82 BPM | DIASTOLIC BLOOD PRESSURE: 65 MMHG | SYSTOLIC BLOOD PRESSURE: 149 MMHG

## 2019-01-01 VITALS
OXYGEN SATURATION: 97 % | WEIGHT: 214.94 LBS | DIASTOLIC BLOOD PRESSURE: 68 MMHG | HEART RATE: 102 BPM | HEIGHT: 66 IN | BODY MASS INDEX: 34.55 KG/M2 | SYSTOLIC BLOOD PRESSURE: 122 MMHG | TEMPERATURE: 99 F

## 2019-01-01 VITALS
HEART RATE: 95 BPM | DIASTOLIC BLOOD PRESSURE: 75 MMHG | SYSTOLIC BLOOD PRESSURE: 149 MMHG | HEIGHT: 66 IN | BODY MASS INDEX: 34.55 KG/M2 | WEIGHT: 214.94 LBS

## 2019-01-01 VITALS
WEIGHT: 196.44 LBS | OXYGEN SATURATION: 97 % | OXYGEN SATURATION: 98 % | HEIGHT: 66 IN | SYSTOLIC BLOOD PRESSURE: 139 MMHG | WEIGHT: 197.06 LBS | SYSTOLIC BLOOD PRESSURE: 157 MMHG | BODY MASS INDEX: 31.67 KG/M2 | TEMPERATURE: 98 F | TEMPERATURE: 97 F | DIASTOLIC BLOOD PRESSURE: 62 MMHG | BODY MASS INDEX: 31.57 KG/M2 | HEIGHT: 66 IN | RESPIRATION RATE: 16 BRPM | HEART RATE: 84 BPM | DIASTOLIC BLOOD PRESSURE: 87 MMHG | HEART RATE: 77 BPM

## 2019-01-01 VITALS — DIASTOLIC BLOOD PRESSURE: 70 MMHG | HEART RATE: 76 BPM | SYSTOLIC BLOOD PRESSURE: 141 MMHG

## 2019-01-01 VITALS
OXYGEN SATURATION: 95 % | SYSTOLIC BLOOD PRESSURE: 124 MMHG | HEART RATE: 93 BPM | HEIGHT: 66 IN | DIASTOLIC BLOOD PRESSURE: 78 MMHG | TEMPERATURE: 96 F | WEIGHT: 205.5 LBS | BODY MASS INDEX: 33.03 KG/M2

## 2019-01-01 DIAGNOSIS — C78.6 PERITONEAL METASTASES: ICD-10-CM

## 2019-01-01 DIAGNOSIS — D70.9: Primary | ICD-10-CM

## 2019-01-01 DIAGNOSIS — R31.9 HEMATURIA, UNSPECIFIED TYPE: ICD-10-CM

## 2019-01-01 DIAGNOSIS — C56.2: ICD-10-CM

## 2019-01-01 DIAGNOSIS — C56.2: Primary | ICD-10-CM

## 2019-01-01 DIAGNOSIS — L84 CORN OR CALLUS: ICD-10-CM

## 2019-01-01 DIAGNOSIS — N30.00 ACUTE CYSTITIS WITHOUT HEMATURIA: ICD-10-CM

## 2019-01-01 DIAGNOSIS — R11.0 NAUSEA: ICD-10-CM

## 2019-01-01 DIAGNOSIS — I10 ESSENTIAL HYPERTENSION: Primary | Chronic | ICD-10-CM

## 2019-01-01 DIAGNOSIS — Z85.43 HISTORY OF OVARIAN CANCER: ICD-10-CM

## 2019-01-01 DIAGNOSIS — B35.1 DERMATOPHYTOSIS OF NAIL: ICD-10-CM

## 2019-01-01 DIAGNOSIS — K59.09 CHRONIC CONSTIPATION: ICD-10-CM

## 2019-01-01 DIAGNOSIS — C78.6 PERITONEAL CARCINOMATOSIS: Primary | ICD-10-CM

## 2019-01-01 DIAGNOSIS — K12.31 MUCOSITIS DUE TO ANTINEOPLASTIC THERAPY: ICD-10-CM

## 2019-01-01 DIAGNOSIS — R10.9 FLANK PAIN: ICD-10-CM

## 2019-01-01 DIAGNOSIS — B35.1 DERMATOPHYTOSIS OF NAIL: Primary | ICD-10-CM

## 2019-01-01 DIAGNOSIS — R19.09 OTHER INTRA-ABDOMINAL AND PELVIC SWELLING, MASS AND LUMP: ICD-10-CM

## 2019-01-01 DIAGNOSIS — R11.2 NON-INTRACTABLE VOMITING WITH NAUSEA, UNSPECIFIED VOMITING TYPE: ICD-10-CM

## 2019-01-01 DIAGNOSIS — E87.6 HYPOKALEMIA: ICD-10-CM

## 2019-01-01 DIAGNOSIS — K59.09 CHRONIC CONSTIPATION: Primary | ICD-10-CM

## 2019-01-01 DIAGNOSIS — R79.1 ABNORMAL BLOOD COAGULATION PROFILE: ICD-10-CM

## 2019-01-01 DIAGNOSIS — Q67.0 FACIAL ASYMMETRY: ICD-10-CM

## 2019-01-01 DIAGNOSIS — C56.9 MALIGNANT NEOPLASM OF OVARY, UNSPECIFIED LATERALITY: ICD-10-CM

## 2019-01-01 DIAGNOSIS — I77.1 ARTERIAL INSUFFICIENCY: ICD-10-CM

## 2019-01-01 DIAGNOSIS — R53.1 WEAKNESS: ICD-10-CM

## 2019-01-01 DIAGNOSIS — K12.30: Primary | ICD-10-CM

## 2019-01-01 DIAGNOSIS — E11.9 TYPE 2 DIABETES MELLITUS WITHOUT COMPLICATION, WITHOUT LONG-TERM CURRENT USE OF INSULIN: ICD-10-CM

## 2019-01-01 DIAGNOSIS — D49.59 OVARIAN NEOPLASM: ICD-10-CM

## 2019-01-01 DIAGNOSIS — Z51.5 ENCOUNTER FOR PALLIATIVE CARE: ICD-10-CM

## 2019-01-01 DIAGNOSIS — R93.5 ABNORMAL ABDOMINAL CT SCAN: Primary | ICD-10-CM

## 2019-01-01 DIAGNOSIS — B35.9 DERMATOPHYTOSIS: Primary | ICD-10-CM

## 2019-01-01 DIAGNOSIS — R54 FRAILTY: ICD-10-CM

## 2019-01-01 DIAGNOSIS — L98.9 SKIN LESION OF FOOT: Primary | ICD-10-CM

## 2019-01-01 DIAGNOSIS — I25.10 CORONARY ARTERY DISEASE INVOLVING NATIVE CORONARY ARTERY OF NATIVE HEART WITHOUT ANGINA PECTORIS: ICD-10-CM

## 2019-01-01 DIAGNOSIS — C78.6 PERITONEAL CARCINOMATOSIS: ICD-10-CM

## 2019-01-01 DIAGNOSIS — R47.81 SLURRED SPEECH: ICD-10-CM

## 2019-01-01 DIAGNOSIS — R10.12 LEFT UPPER QUADRANT PAIN: Primary | ICD-10-CM

## 2019-01-01 DIAGNOSIS — R00.0 TACHYCARDIA: ICD-10-CM

## 2019-01-01 DIAGNOSIS — R82.90 ABNORMAL URINE: ICD-10-CM

## 2019-01-01 DIAGNOSIS — G89.3 NEOPLASM RELATED PAIN: ICD-10-CM

## 2019-01-01 DIAGNOSIS — I10 ESSENTIAL HYPERTENSION: Chronic | ICD-10-CM

## 2019-01-01 DIAGNOSIS — R57.9 SHOCK: ICD-10-CM

## 2019-01-01 DIAGNOSIS — K59.00 CONSTIPATION, UNSPECIFIED CONSTIPATION TYPE: ICD-10-CM

## 2019-01-01 DIAGNOSIS — E11.9 TYPE 2 DIABETES MELLITUS WITHOUT COMPLICATION, WITHOUT LONG-TERM CURRENT USE OF INSULIN: Chronic | ICD-10-CM

## 2019-01-01 DIAGNOSIS — E11.65 TYPE 2 DIABETES MELLITUS WITH HYPERGLYCEMIA, WITHOUT LONG-TERM CURRENT USE OF INSULIN: ICD-10-CM

## 2019-01-01 DIAGNOSIS — Z95.5 S/P CORONARY ARTERY STENT PLACEMENT: ICD-10-CM

## 2019-01-01 DIAGNOSIS — N81.4 UTERINE PROLAPSE: ICD-10-CM

## 2019-01-01 DIAGNOSIS — R26.81 GAIT INSTABILITY: ICD-10-CM

## 2019-01-01 DIAGNOSIS — N28.1 ACQUIRED RENAL CYST OF RIGHT KIDNEY: ICD-10-CM

## 2019-01-01 LAB
ALBUMIN SERPL BCP-MCNC: 1.7 G/DL (ref 3.5–5.2)
ALBUMIN SERPL BCP-MCNC: 3.4 G/DL (ref 3.5–5.2)
ALBUMIN SERPL BCP-MCNC: 3.5 G/DL (ref 3.5–5.2)
ALLENS TEST: ABNORMAL
ALLENS TEST: ABNORMAL
ALP SERPL-CCNC: 116 U/L (ref 55–135)
ALP SERPL-CCNC: 49 U/L (ref 55–135)
ALP SERPL-CCNC: 55 U/L (ref 55–135)
ALT SERPL W/O P-5'-P-CCNC: 32 U/L (ref 10–44)
ALT SERPL W/O P-5'-P-CCNC: 86 U/L (ref 10–44)
ALT SERPL W/O P-5'-P-CCNC: 9 U/L (ref 10–44)
ANION GAP SERPL CALC-SCNC: 11 MMOL/L (ref 8–16)
ANION GAP SERPL CALC-SCNC: 11 MMOL/L (ref 8–16)
ANION GAP SERPL CALC-SCNC: 12 MMOL/L (ref 8–16)
ANION GAP SERPL CALC-SCNC: 14 MMOL/L (ref 8–16)
ANION GAP SERPL CALC-SCNC: 14 MMOL/L (ref 8–16)
ANION GAP SERPL CALC-SCNC: 16 MMOL/L (ref 8–16)
ANISOCYTOSIS BLD QL SMEAR: SLIGHT
AST SERPL-CCNC: 26 U/L (ref 10–40)
AST SERPL-CCNC: 45 U/L (ref 10–40)
AST SERPL-CCNC: 88 U/L (ref 10–40)
B-OH-BUTYR BLD STRIP-SCNC: 0.5 MMOL/L (ref 0–0.5)
BACTERIA #/AREA URNS HPF: ABNORMAL /HPF
BACTERIA THROAT CULT: NORMAL
BACTERIA UR CULT: ABNORMAL
BACTERIA UR CULT: ABNORMAL
BASOPHILS # BLD AUTO: 0 K/UL (ref 0–0.2)
BASOPHILS # BLD AUTO: 0.03 K/UL (ref 0–0.2)
BASOPHILS NFR BLD: 0 % (ref 0–1.9)
BASOPHILS NFR BLD: 0.5 % (ref 0–1.9)
BILIRUB SERPL-MCNC: 0.6 MG/DL (ref 0.1–1)
BILIRUB SERPL-MCNC: 0.9 MG/DL (ref 0.1–1)
BILIRUB SERPL-MCNC: 1.4 MG/DL (ref 0.1–1)
BILIRUB SERPL-MCNC: NEGATIVE MG/DL
BILIRUB UR QL STRIP: NEGATIVE
BLOOD URINE, POC: NORMAL
BUN SERPL-MCNC: 14 MG/DL (ref 8–23)
BUN SERPL-MCNC: 16 MG/DL (ref 8–23)
BUN SERPL-MCNC: 18 MG/DL (ref 8–23)
BUN SERPL-MCNC: 20 MG/DL (ref 8–23)
BUN SERPL-MCNC: 25 MG/DL (ref 8–23)
BUN SERPL-MCNC: 29 MG/DL (ref 8–23)
BUN SERPL-MCNC: 33 MG/DL (ref 8–23)
BUN SERPL-MCNC: 7 MG/DL (ref 8–23)
CALCIUM SERPL-MCNC: 8.5 MG/DL (ref 8.7–10.5)
CALCIUM SERPL-MCNC: 8.6 MG/DL (ref 8.7–10.5)
CALCIUM SERPL-MCNC: 8.7 MG/DL (ref 8.7–10.5)
CALCIUM SERPL-MCNC: 8.8 MG/DL (ref 8.7–10.5)
CALCIUM SERPL-MCNC: 9.6 MG/DL (ref 8.7–10.5)
CALCIUM SERPL-MCNC: 9.8 MG/DL (ref 8.7–10.5)
CANCER AG125 SERPL-ACNC: 261 U/ML (ref 0–30)
CEA SERPL-MCNC: 3 NG/ML (ref 0–5)
CHLORIDE SERPL-SCNC: 100 MMOL/L (ref 95–110)
CHLORIDE SERPL-SCNC: 104 MMOL/L (ref 95–110)
CHLORIDE SERPL-SCNC: 104 MMOL/L (ref 95–110)
CHLORIDE SERPL-SCNC: 105 MMOL/L (ref 95–110)
CHLORIDE SERPL-SCNC: 107 MMOL/L (ref 95–110)
CHLORIDE SERPL-SCNC: 108 MMOL/L (ref 95–110)
CHLORIDE SERPL-SCNC: 109 MMOL/L (ref 95–110)
CHLORIDE SERPL-SCNC: 96 MMOL/L (ref 95–110)
CLARITY UR: CLEAR
CO2 SERPL-SCNC: 15 MMOL/L (ref 23–29)
CO2 SERPL-SCNC: 17 MMOL/L (ref 23–29)
CO2 SERPL-SCNC: 21 MMOL/L (ref 23–29)
CO2 SERPL-SCNC: 21 MMOL/L (ref 23–29)
CO2 SERPL-SCNC: 24 MMOL/L (ref 23–29)
CO2 SERPL-SCNC: 25 MMOL/L (ref 23–29)
CO2 SERPL-SCNC: 26 MMOL/L (ref 23–29)
CO2 SERPL-SCNC: 29 MMOL/L (ref 23–29)
COLOR UR: YELLOW
COLOR, POC UA: NORMAL
CREAT SERPL-MCNC: 0.6 MG/DL (ref 0.5–1.4)
CREAT SERPL-MCNC: 0.7 MG/DL (ref 0.5–1.4)
CREAT SERPL-MCNC: 0.8 MG/DL (ref 0.5–1.4)
CREAT SERPL-MCNC: 1 MG/DL (ref 0.5–1.4)
DACRYOCYTES BLD QL SMEAR: ABNORMAL
DELSYS: ABNORMAL
DELSYS: ABNORMAL
DEPRECATED S PYO AG THROAT QL EIA: NEGATIVE
DIFFERENTIAL METHOD: ABNORMAL
EOSINOPHIL # BLD AUTO: 0 K/UL (ref 0–0.5)
EOSINOPHIL # BLD AUTO: 0.1 K/UL (ref 0–0.5)
EOSINOPHIL NFR BLD: 0 % (ref 0–8)
EOSINOPHIL NFR BLD: 1.3 % (ref 0–8)
ERYTHROCYTE [DISTWIDTH] IN BLOOD BY AUTOMATED COUNT: 14.7 % (ref 11.5–14.5)
ERYTHROCYTE [DISTWIDTH] IN BLOOD BY AUTOMATED COUNT: 14.9 % (ref 11.5–14.5)
ERYTHROCYTE [DISTWIDTH] IN BLOOD BY AUTOMATED COUNT: 15.2 % (ref 11.5–14.5)
ERYTHROCYTE [DISTWIDTH] IN BLOOD BY AUTOMATED COUNT: 15.3 % (ref 11.5–14.5)
ERYTHROCYTE [DISTWIDTH] IN BLOOD BY AUTOMATED COUNT: 15.4 % (ref 11.5–14.5)
ERYTHROCYTE [DISTWIDTH] IN BLOOD BY AUTOMATED COUNT: 15.5 % (ref 11.5–14.5)
ERYTHROCYTE [DISTWIDTH] IN BLOOD BY AUTOMATED COUNT: 16.6 % (ref 11.5–14.5)
ERYTHROCYTE [SEDIMENTATION RATE] IN BLOOD BY WESTERGREN METHOD: 24 MM/H
EST. GFR  (AFRICAN AMERICAN): >60 ML/MIN/1.73 M^2
EST. GFR  (NON AFRICAN AMERICAN): 53 ML/MIN/1.73 M^2
EST. GFR  (NON AFRICAN AMERICAN): >60 ML/MIN/1.73 M^2
ESTIMATED AVG GLUCOSE: 146 MG/DL (ref 68–131)
FINAL PATHOLOGIC DIAGNOSIS: NORMAL
FIO2: 10
FLOW: 5
GLUCOSE SERPL-MCNC: 111 MG/DL (ref 70–110)
GLUCOSE SERPL-MCNC: 196 MG/DL (ref 70–110)
GLUCOSE SERPL-MCNC: 201 MG/DL (ref 70–110)
GLUCOSE SERPL-MCNC: 203 MG/DL (ref 70–110)
GLUCOSE SERPL-MCNC: 212 MG/DL (ref 70–110)
GLUCOSE SERPL-MCNC: 236 MG/DL (ref 70–110)
GLUCOSE SERPL-MCNC: 240 MG/DL (ref 70–110)
GLUCOSE SERPL-MCNC: 264 MG/DL (ref 70–110)
GLUCOSE SERPL-MCNC: 280 MG/DL (ref 70–110)
GLUCOSE UR QL STRIP: ABNORMAL
GLUCOSE UR QL STRIP: NORMAL
GROSS: NORMAL
HBA1C MFR BLD HPLC: 6.7 % (ref 4–5.6)
HCO3 UR-SCNC: 17.5 MMOL/L (ref 24–28)
HCO3 UR-SCNC: 21.5 MMOL/L (ref 24–28)
HCT VFR BLD AUTO: 34.9 % (ref 37–48.5)
HCT VFR BLD AUTO: 35.5 % (ref 37–48.5)
HCT VFR BLD AUTO: 35.6 % (ref 37–48.5)
HCT VFR BLD AUTO: 37.2 % (ref 37–48.5)
HCT VFR BLD AUTO: 37.5 % (ref 37–48.5)
HCT VFR BLD AUTO: 40.7 % (ref 37–48.5)
HCT VFR BLD AUTO: 41.9 % (ref 37–48.5)
HCT VFR BLD CALC: 33 %PCV (ref 36–54)
HGB BLD-MCNC: 11.6 G/DL (ref 12–16)
HGB BLD-MCNC: 11.7 G/DL (ref 12–16)
HGB BLD-MCNC: 11.8 G/DL (ref 12–16)
HGB BLD-MCNC: 11.8 G/DL (ref 12–16)
HGB BLD-MCNC: 12.2 G/DL (ref 12–16)
HGB BLD-MCNC: 12.8 G/DL (ref 12–16)
HGB BLD-MCNC: 13.9 G/DL (ref 12–16)
HGB UR QL STRIP: NEGATIVE
HYPOCHROMIA BLD QL SMEAR: ABNORMAL
IMM GRANULOCYTES # BLD AUTO: 0 K/UL (ref 0–0.04)
IMM GRANULOCYTES # BLD AUTO: 0 K/UL (ref 0–0.04)
IMM GRANULOCYTES # BLD AUTO: 0.01 K/UL (ref 0–0.04)
IMM GRANULOCYTES # BLD AUTO: 0.01 K/UL (ref 0–0.04)
IMM GRANULOCYTES # BLD AUTO: ABNORMAL K/UL (ref 0–0.04)
IMM GRANULOCYTES NFR BLD AUTO: 0 % (ref 0–0.5)
IMM GRANULOCYTES NFR BLD AUTO: 0 % (ref 0–0.5)
IMM GRANULOCYTES NFR BLD AUTO: 0.2 % (ref 0–0.5)
IMM GRANULOCYTES NFR BLD AUTO: 2.3 % (ref 0–0.5)
IMM GRANULOCYTES NFR BLD AUTO: ABNORMAL % (ref 0–0.5)
KETONES UR QL STRIP: ABNORMAL
KETONES UR QL STRIP: NORMAL
LACTATE SERPL-SCNC: 3.5 MMOL/L (ref 0.5–2.2)
LACTATE SERPL-SCNC: 6.9 MMOL/L (ref 0.5–2.2)
LEUKOCYTE ESTERASE UR QL STRIP: NEGATIVE
LEUKOCYTE ESTERASE URINE, POC: NORMAL
LYMPHOCYTES # BLD AUTO: 0.2 K/UL (ref 1–4.8)
LYMPHOCYTES # BLD AUTO: 0.2 K/UL (ref 1–4.8)
LYMPHOCYTES # BLD AUTO: 0.3 K/UL (ref 1–4.8)
LYMPHOCYTES # BLD AUTO: 1.8 K/UL (ref 1–4.8)
LYMPHOCYTES NFR BLD: 14 % (ref 18–48)
LYMPHOCYTES NFR BLD: 27.9 % (ref 18–48)
LYMPHOCYTES NFR BLD: 56.8 % (ref 18–48)
LYMPHOCYTES NFR BLD: 61 % (ref 18–48)
LYMPHOCYTES NFR BLD: 72 % (ref 18–48)
LYMPHOCYTES NFR BLD: 85.2 % (ref 18–48)
LYMPHOCYTES NFR BLD: 89 % (ref 18–48)
Lab: NORMAL
MAGNESIUM SERPL-MCNC: 2.2 MG/DL (ref 1.6–2.6)
MCH RBC QN AUTO: 31.3 PG (ref 27–31)
MCH RBC QN AUTO: 31.4 PG (ref 27–31)
MCH RBC QN AUTO: 31.8 PG (ref 27–31)
MCH RBC QN AUTO: 31.8 PG (ref 27–31)
MCH RBC QN AUTO: 32.3 PG (ref 27–31)
MCHC RBC AUTO-ENTMCNC: 31.4 G/DL (ref 32–36)
MCHC RBC AUTO-ENTMCNC: 31.7 G/DL (ref 32–36)
MCHC RBC AUTO-ENTMCNC: 32.5 G/DL (ref 32–36)
MCHC RBC AUTO-ENTMCNC: 33 G/DL (ref 32–36)
MCHC RBC AUTO-ENTMCNC: 33.1 G/DL (ref 32–36)
MCHC RBC AUTO-ENTMCNC: 33.2 G/DL (ref 32–36)
MCHC RBC AUTO-ENTMCNC: 33.2 G/DL (ref 32–36)
MCV RBC AUTO: 100 FL (ref 82–98)
MCV RBC AUTO: 95 FL (ref 82–98)
MCV RBC AUTO: 96 FL (ref 82–98)
MCV RBC AUTO: 97 FL (ref 82–98)
MCV RBC AUTO: 99 FL (ref 82–98)
METAMYELOCYTES NFR BLD MANUAL: 2 %
MICROSCOPIC COMMENT: ABNORMAL
MIN VOL: 17.1
MODE: ABNORMAL
MODE: ABNORMAL
MONOCYTES # BLD AUTO: 0 K/UL (ref 0.3–1)
MONOCYTES # BLD AUTO: 0 K/UL (ref 0.3–1)
MONOCYTES # BLD AUTO: 0.1 K/UL (ref 0.3–1)
MONOCYTES # BLD AUTO: 0.7 K/UL (ref 0.3–1)
MONOCYTES NFR BLD: 10 % (ref 4–15)
MONOCYTES NFR BLD: 11 % (ref 4–15)
MONOCYTES NFR BLD: 11.1 % (ref 4–15)
MONOCYTES NFR BLD: 16 % (ref 4–15)
MONOCYTES NFR BLD: 22.7 % (ref 4–15)
MONOCYTES NFR BLD: 4 % (ref 4–15)
MONOCYTES NFR BLD: 7 % (ref 4–15)
MYELOCYTES NFR BLD MANUAL: 4 %
NEUTROPHILS # BLD AUTO: 0 K/UL (ref 1.8–7.7)
NEUTROPHILS # BLD AUTO: 0 K/UL (ref 1.8–7.7)
NEUTROPHILS # BLD AUTO: 0.1 K/UL (ref 1.8–7.7)
NEUTROPHILS # BLD AUTO: 3.8 K/UL (ref 1.8–7.7)
NEUTROPHILS NFR BLD: 12 % (ref 38–73)
NEUTROPHILS NFR BLD: 18.2 % (ref 38–73)
NEUTROPHILS NFR BLD: 3.7 % (ref 38–73)
NEUTROPHILS NFR BLD: 32 % (ref 38–73)
NEUTROPHILS NFR BLD: 59.3 % (ref 38–73)
NEUTROPHILS NFR BLD: 62 % (ref 38–73)
NEUTROPHILS NFR BLD: 7 % (ref 38–73)
NEUTS BAND NFR BLD MANUAL: 8 %
NITRITE UR QL STRIP: POSITIVE
NITRITE, POC UA: POSITIVE
NRBC BLD-RTO: 0 /100 WBC
NRBC BLD-RTO: 2 /100 WBC
NRBC BLD-RTO: 4 /100 WBC
OVALOCYTES BLD QL SMEAR: ABNORMAL
PATH REV BLD -IMP: NORMAL
PCO2 BLDA: 24.8 MMHG (ref 35–45)
PCO2 BLDA: 34.3 MMHG (ref 35–45)
PH SMN: 7.32 [PH] (ref 7.35–7.45)
PH SMN: 7.54 [PH] (ref 7.35–7.45)
PH UR STRIP: 6 [PH] (ref 5–8)
PH, POC UA: 6
PHOSPHATE SERPL-MCNC: 1.8 MG/DL (ref 2.7–4.5)
PLATELET # BLD AUTO: 296 K/UL (ref 150–350)
PLATELET # BLD AUTO: 54 K/UL (ref 150–350)
PLATELET # BLD AUTO: 63 K/UL (ref 150–350)
PLATELET # BLD AUTO: 66 K/UL (ref 150–350)
PLATELET # BLD AUTO: 67 K/UL (ref 150–350)
PLATELET # BLD AUTO: 79 K/UL (ref 150–350)
PLATELET # BLD AUTO: 91 K/UL (ref 150–350)
PLATELET BLD QL SMEAR: ABNORMAL
PMV BLD AUTO: 12.4 FL (ref 9.2–12.9)
PMV BLD AUTO: 13.2 FL (ref 9.2–12.9)
PMV BLD AUTO: 13.3 FL (ref 9.2–12.9)
PMV BLD AUTO: 13.3 FL (ref 9.2–12.9)
PMV BLD AUTO: 13.9 FL (ref 9.2–12.9)
PMV BLD AUTO: 9.7 FL (ref 9.2–12.9)
PMV BLD AUTO: ABNORMAL FL (ref 9.2–12.9)
PO2 BLDA: 44 MMHG (ref 80–100)
PO2 BLDA: 55 MMHG (ref 80–100)
POC BE: -1 MMOL/L
POC BE: -9 MMOL/L
POC IONIZED CALCIUM: 1.2 MMOL/L (ref 1.06–1.42)
POC SATURATED O2: 77 % (ref 95–100)
POC SATURATED O2: 92 % (ref 95–100)
POCT GLUCOSE: 151 MG/DL (ref 70–110)
POCT GLUCOSE: 173 MG/DL (ref 70–110)
POCT GLUCOSE: 177 MG/DL (ref 70–110)
POCT GLUCOSE: 183 MG/DL (ref 70–110)
POCT GLUCOSE: 190 MG/DL (ref 70–110)
POCT GLUCOSE: 193 MG/DL (ref 70–110)
POCT GLUCOSE: 196 MG/DL (ref 70–110)
POCT GLUCOSE: 201 MG/DL (ref 70–110)
POCT GLUCOSE: 202 MG/DL (ref 70–110)
POCT GLUCOSE: 204 MG/DL (ref 70–110)
POCT GLUCOSE: 204 MG/DL (ref 70–110)
POCT GLUCOSE: 213 MG/DL (ref 70–110)
POCT GLUCOSE: 214 MG/DL (ref 70–110)
POCT GLUCOSE: 240 MG/DL (ref 70–110)
POCT GLUCOSE: 249 MG/DL (ref 70–110)
POCT GLUCOSE: 251 MG/DL (ref 70–110)
POCT GLUCOSE: 263 MG/DL (ref 70–110)
POCT GLUCOSE: 266 MG/DL (ref 70–110)
POIKILOCYTOSIS BLD QL SMEAR: SLIGHT
POLYCHROMASIA BLD QL SMEAR: ABNORMAL
POTASSIUM BLD-SCNC: 3.6 MMOL/L (ref 3.5–5.1)
POTASSIUM SERPL-SCNC: 2.8 MMOL/L (ref 3.5–5.1)
POTASSIUM SERPL-SCNC: 3 MMOL/L (ref 3.5–5.1)
POTASSIUM SERPL-SCNC: 3 MMOL/L (ref 3.5–5.1)
POTASSIUM SERPL-SCNC: 3.1 MMOL/L (ref 3.5–5.1)
POTASSIUM SERPL-SCNC: 3.4 MMOL/L (ref 3.5–5.1)
POTASSIUM SERPL-SCNC: 3.6 MMOL/L (ref 3.5–5.1)
POTASSIUM SERPL-SCNC: 3.9 MMOL/L (ref 3.5–5.1)
POTASSIUM SERPL-SCNC: 4.5 MMOL/L (ref 3.5–5.1)
PROT SERPL-MCNC: 5.5 G/DL (ref 6–8.4)
PROT SERPL-MCNC: 7.3 G/DL (ref 6–8.4)
PROT SERPL-MCNC: 8.2 G/DL (ref 6–8.4)
PROT UR QL STRIP: ABNORMAL
PROTEIN, POC: NORMAL
RBC # BLD AUTO: 3.65 M/UL (ref 4–5.4)
RBC # BLD AUTO: 3.68 M/UL (ref 4–5.4)
RBC # BLD AUTO: 3.76 M/UL (ref 4–5.4)
RBC # BLD AUTO: 3.77 M/UL (ref 4–5.4)
RBC # BLD AUTO: 3.88 M/UL (ref 4–5.4)
RBC # BLD AUTO: 4.08 M/UL (ref 4–5.4)
RBC # BLD AUTO: 4.31 M/UL (ref 4–5.4)
RBC #/AREA URNS HPF: 0 /HPF (ref 0–4)
SAMPLE: ABNORMAL
SAMPLE: ABNORMAL
SITE: ABNORMAL
SITE: ABNORMAL
SODIUM BLD-SCNC: 138 MMOL/L (ref 136–145)
SODIUM SERPL-SCNC: 137 MMOL/L (ref 136–145)
SODIUM SERPL-SCNC: 138 MMOL/L (ref 136–145)
SODIUM SERPL-SCNC: 138 MMOL/L (ref 136–145)
SODIUM SERPL-SCNC: 139 MMOL/L (ref 136–145)
SODIUM SERPL-SCNC: 139 MMOL/L (ref 136–145)
SODIUM SERPL-SCNC: 140 MMOL/L (ref 136–145)
SODIUM SERPL-SCNC: 140 MMOL/L (ref 136–145)
SODIUM SERPL-SCNC: 142 MMOL/L (ref 136–145)
SP GR UR STRIP: 1.02 (ref 1–1.03)
SP02: 84
SPECIFIC GRAVITY, POC UA: 1.02
SPONT RATE: 33
SQUAMOUS #/AREA URNS HPF: 3 /HPF
STOMATOCYTES BLD QL SMEAR: PRESENT
STOMATOCYTES BLD QL SMEAR: PRESENT
TARGETS BLD QL SMEAR: ABNORMAL
URN SPEC COLLECT METH UR: ABNORMAL
UROBILINOGEN UR STRIP-ACNC: NEGATIVE EU/DL
UROBILINOGEN, POC UA: NORMAL
WBC # BLD AUTO: 0.25 K/UL (ref 3.9–12.7)
WBC # BLD AUTO: 0.27 K/UL (ref 3.9–12.7)
WBC # BLD AUTO: 0.33 K/UL (ref 3.9–12.7)
WBC # BLD AUTO: 0.44 K/UL (ref 3.9–12.7)
WBC # BLD AUTO: 10.3 K/UL (ref 3.9–12.7)
WBC # BLD AUTO: 2.02 K/UL (ref 3.9–12.7)
WBC # BLD AUTO: 6.38 K/UL (ref 3.9–12.7)
WBC #/AREA URNS HPF: 0 /HPF (ref 0–5)
WBC CASTS #/AREA URNS LPF: 1 /LPF

## 2019-01-01 PROCEDURE — 1126F AMNT PAIN NOTED NONE PRSNT: CPT | Mod: ,,, | Performed by: OBSTETRICS & GYNECOLOGY

## 2019-01-01 PROCEDURE — 99215 PR OFFICE/OUTPT VISIT, EST, LEVL V, 40-54 MIN: ICD-10-PCS | Mod: S$PBB,,, | Performed by: INTERNAL MEDICINE

## 2019-01-01 PROCEDURE — 11000001 HC ACUTE MED/SURG PRIVATE ROOM

## 2019-01-01 PROCEDURE — 36620 INSERTION CATHETER ARTERY: CPT | Mod: ,,, | Performed by: NURSE PRACTITIONER

## 2019-01-01 PROCEDURE — 85007 BL SMEAR W/DIFF WBC COUNT: CPT

## 2019-01-01 PROCEDURE — 88342 IMHCHEM/IMCYTCHM 1ST ANTB: CPT | Performed by: PATHOLOGY

## 2019-01-01 PROCEDURE — 99900039 HC SLP GENERIC THERAPY SCREENING (STAT)

## 2019-01-01 PROCEDURE — 99999 PR PBB SHADOW E&M-EST. PATIENT-LVL III: ICD-10-PCS | Mod: PBBFAC,,, | Performed by: INTERNAL MEDICINE

## 2019-01-01 PROCEDURE — 1126F PR PAIN SEVERITY QUANTIFIED, NO PAIN PRESENT: ICD-10-PCS | Mod: ,,, | Performed by: NURSE PRACTITIONER

## 2019-01-01 PROCEDURE — 99205 OFFICE O/P NEW HI 60 MIN: CPT | Mod: S$PBB,,, | Performed by: INTERNAL MEDICINE

## 2019-01-01 PROCEDURE — 99213 OFFICE O/P EST LOW 20 MIN: CPT | Mod: PBBFAC | Performed by: INTERNAL MEDICINE

## 2019-01-01 PROCEDURE — 97530 THERAPEUTIC ACTIVITIES: CPT

## 2019-01-01 PROCEDURE — 63600175 PHARM REV CODE 636 W HCPCS: Performed by: NURSE PRACTITIONER

## 2019-01-01 PROCEDURE — 96372 THER/PROPH/DIAG INJ SC/IM: CPT | Mod: 59 | Performed by: EMERGENCY MEDICINE

## 2019-01-01 PROCEDURE — G0378 HOSPITAL OBSERVATION PER HR: HCPCS

## 2019-01-01 PROCEDURE — 1159F PR MEDICATION LIST DOCUMENTED IN MEDICAL RECORD: ICD-10-PCS | Mod: ,,, | Performed by: OBSTETRICS & GYNECOLOGY

## 2019-01-01 PROCEDURE — S0028 INJECTION, FAMOTIDINE, 20 MG: HCPCS | Performed by: NURSE PRACTITIONER

## 2019-01-01 PROCEDURE — 74019 XR ABDOMEN FLAT AND ERECT: ICD-10-PCS | Mod: 26,,, | Performed by: RADIOLOGY

## 2019-01-01 PROCEDURE — 87880 STREP A ASSAY W/OPTIC: CPT

## 2019-01-01 PROCEDURE — 74177 CT ABD & PELVIS W/CONTRAST: CPT | Mod: TC

## 2019-01-01 PROCEDURE — 94640 AIRWAY INHALATION TREATMENT: CPT

## 2019-01-01 PROCEDURE — 25000003 PHARM REV CODE 250: Performed by: PHYSICIAN ASSISTANT

## 2019-01-01 PROCEDURE — 99999 PR PBB SHADOW E&M-EST. PATIENT-LVL V: CPT | Mod: PBBFAC,,, | Performed by: NURSE PRACTITIONER

## 2019-01-01 PROCEDURE — 25000003 PHARM REV CODE 250: Performed by: NURSE PRACTITIONER

## 2019-01-01 PROCEDURE — 25000003 PHARM REV CODE 250: Performed by: INTERNAL MEDICINE

## 2019-01-01 PROCEDURE — 99285 EMERGENCY DEPT VISIT HI MDM: CPT | Mod: 25

## 2019-01-01 PROCEDURE — 99999 PR PBB SHADOW E&M-EST. PATIENT-LVL III: CPT | Mod: PBBFAC,,, | Performed by: INTERNAL MEDICINE

## 2019-01-01 PROCEDURE — 99233 SBSQ HOSP IP/OBS HIGH 50: CPT | Mod: ,,, | Performed by: INTERNAL MEDICINE

## 2019-01-01 PROCEDURE — 1125F PR PAIN SEVERITY QUANTIFIED, PAIN PRESENT: ICD-10-PCS | Mod: ,,, | Performed by: PODIATRIST

## 2019-01-01 PROCEDURE — 99215 OFFICE O/P EST HI 40 MIN: CPT | Mod: S$PBB,,, | Performed by: INTERNAL MEDICINE

## 2019-01-01 PROCEDURE — 1159F PR MEDICATION LIST DOCUMENTED IN MEDICAL RECORD: ICD-10-PCS | Mod: ,,, | Performed by: PODIATRIST

## 2019-01-01 PROCEDURE — 99499 UNLISTED E&M SERVICE: CPT | Mod: S$PBB,,, | Performed by: PODIATRIST

## 2019-01-01 PROCEDURE — 94660 CPAP INITIATION&MGMT: CPT

## 2019-01-01 PROCEDURE — 99233 PR SUBSEQUENT HOSPITAL CARE,LEVL III: ICD-10-PCS | Mod: ,,, | Performed by: PHYSICIAN ASSISTANT

## 2019-01-01 PROCEDURE — 11721 PR DEBRIDEMENT OF NAILS, 6 OR MORE: ICD-10-PCS | Mod: Q8,S$PBB,, | Performed by: PODIATRIST

## 2019-01-01 PROCEDURE — 99213 OFFICE O/P EST LOW 20 MIN: CPT | Mod: S$PBB,,, | Performed by: NURSE PRACTITIONER

## 2019-01-01 PROCEDURE — 88341 IMHCHEM/IMCYTCHM EA ADD ANTB: CPT | Mod: 26,,, | Performed by: PATHOLOGY

## 2019-01-01 PROCEDURE — 96365 THER/PROPH/DIAG IV INF INIT: CPT

## 2019-01-01 PROCEDURE — 1126F AMNT PAIN NOTED NONE PRSNT: CPT | Mod: ,,, | Performed by: NURSE PRACTITIONER

## 2019-01-01 PROCEDURE — 25000003 PHARM REV CODE 250: Performed by: EMERGENCY MEDICINE

## 2019-01-01 PROCEDURE — 36600 WITHDRAWAL OF ARTERIAL BLOOD: CPT

## 2019-01-01 PROCEDURE — 63600175 PHARM REV CODE 636 W HCPCS: Performed by: INTERNAL MEDICINE

## 2019-01-01 PROCEDURE — 63600175 PHARM REV CODE 636 W HCPCS: Performed by: EMERGENCY MEDICINE

## 2019-01-01 PROCEDURE — 85027 COMPLETE CBC AUTOMATED: CPT

## 2019-01-01 PROCEDURE — 86304 IMMUNOASSAY TUMOR CA 125: CPT

## 2019-01-01 PROCEDURE — 97167 OT EVAL HIGH COMPLEX 60 MIN: CPT

## 2019-01-01 PROCEDURE — 99499 NO LOS: ICD-10-PCS | Mod: S$PBB,,, | Performed by: PODIATRIST

## 2019-01-01 PROCEDURE — 1159F MED LIST DOCD IN RCRD: CPT | Mod: ,,, | Performed by: PODIATRIST

## 2019-01-01 PROCEDURE — 99214 PR OFFICE/OUTPT VISIT, EST, LEVL IV, 30-39 MIN: ICD-10-PCS | Mod: S$PBB,,, | Performed by: NURSE PRACTITIONER

## 2019-01-01 PROCEDURE — 99999 PR PBB SHADOW E&M-EST. PATIENT-LVL III: CPT | Mod: PBBFAC,,, | Performed by: PODIATRIST

## 2019-01-01 PROCEDURE — 63600175 PHARM REV CODE 636 W HCPCS: Performed by: PHYSICIAN ASSISTANT

## 2019-01-01 PROCEDURE — 80048 BASIC METABOLIC PNL TOTAL CA: CPT

## 2019-01-01 PROCEDURE — 96367 TX/PROPH/DG ADDL SEQ IV INF: CPT

## 2019-01-01 PROCEDURE — 94761 N-INVAS EAR/PLS OXIMETRY MLT: CPT

## 2019-01-01 PROCEDURE — 99233 SBSQ HOSP IP/OBS HIGH 50: CPT | Mod: ,,, | Performed by: PHYSICIAN ASSISTANT

## 2019-01-01 PROCEDURE — 99291 PR CRITICAL CARE, E/M 30-74 MINUTES: ICD-10-PCS | Mod: ,,, | Performed by: NURSE PRACTITIONER

## 2019-01-01 PROCEDURE — 1159F MED LIST DOCD IN RCRD: CPT | Mod: ,,, | Performed by: OBSTETRICS & GYNECOLOGY

## 2019-01-01 PROCEDURE — 99214 OFFICE O/P EST MOD 30 MIN: CPT | Mod: PBBFAC,PN,25 | Performed by: PODIATRIST

## 2019-01-01 PROCEDURE — 99223 PR INITIAL HOSPITAL CARE,LEVL III: ICD-10-PCS | Mod: ,,, | Performed by: PHYSICIAN ASSISTANT

## 2019-01-01 PROCEDURE — 99900035 HC TECH TIME PER 15 MIN (STAT)

## 2019-01-01 PROCEDURE — 1159F MED LIST DOCD IN RCRD: CPT | Mod: ,,, | Performed by: INTERNAL MEDICINE

## 2019-01-01 PROCEDURE — 1159F PR MEDICATION LIST DOCUMENTED IN MEDICAL RECORD: ICD-10-PCS | Mod: ,,, | Performed by: NURSE PRACTITIONER

## 2019-01-01 PROCEDURE — 87086 URINE CULTURE/COLONY COUNT: CPT

## 2019-01-01 PROCEDURE — 99999 PR PBB SHADOW E&M-EST. PATIENT-LVL III: ICD-10-PCS | Mod: PBBFAC,,, | Performed by: PODIATRIST

## 2019-01-01 PROCEDURE — 97163 PT EVAL HIGH COMPLEX 45 MIN: CPT

## 2019-01-01 PROCEDURE — 74176 CT ABD & PELVIS W/O CONTRAST: CPT | Mod: 26,,, | Performed by: RADIOLOGY

## 2019-01-01 PROCEDURE — 85025 COMPLETE CBC W/AUTO DIFF WBC: CPT

## 2019-01-01 PROCEDURE — 27100092 HC HIGH FLOW DELIVERY CANNULA

## 2019-01-01 PROCEDURE — 99999 PR PBB SHADOW E&M-EST. PATIENT-LVL III: ICD-10-PCS | Mod: PBBFAC,,, | Performed by: FAMILY MEDICINE

## 2019-01-01 PROCEDURE — 1125F AMNT PAIN NOTED PAIN PRSNT: CPT | Mod: ,,, | Performed by: PODIATRIST

## 2019-01-01 PROCEDURE — 36620 INSERTION CATHETER ARTERY: CPT

## 2019-01-01 PROCEDURE — 99215 PR OFFICE/OUTPT VISIT, EST, LEVL V, 40-54 MIN: ICD-10-PCS | Mod: S$PBB,,, | Performed by: NURSE PRACTITIONER

## 2019-01-01 PROCEDURE — 85060 BLOOD SMEAR INTERPRETATION: CPT | Mod: ,,, | Performed by: PATHOLOGY

## 2019-01-01 PROCEDURE — 80048 BASIC METABOLIC PNL TOTAL CA: CPT | Mod: 91

## 2019-01-01 PROCEDURE — 96366 THER/PROPH/DIAG IV INF ADDON: CPT

## 2019-01-01 PROCEDURE — 99999 PR PBB SHADOW E&M-EST. PATIENT-LVL IV: CPT | Mod: PBBFAC,,, | Performed by: PODIATRIST

## 2019-01-01 PROCEDURE — 94664 DEMO&/EVAL PT USE INHALER: CPT

## 2019-01-01 PROCEDURE — 99233 PR SUBSEQUENT HOSPITAL CARE,LEVL III: ICD-10-PCS | Mod: ,,, | Performed by: INTERNAL MEDICINE

## 2019-01-01 PROCEDURE — 80053 COMPREHEN METABOLIC PANEL: CPT

## 2019-01-01 PROCEDURE — 85060 PATHOLOGIST REVIEW: ICD-10-PCS | Mod: ,,, | Performed by: PATHOLOGY

## 2019-01-01 PROCEDURE — 99213 OFFICE O/P EST LOW 20 MIN: CPT | Mod: PBBFAC,25 | Performed by: PODIATRIST

## 2019-01-01 PROCEDURE — 96376 TX/PRO/DX INJ SAME DRUG ADON: CPT | Performed by: EMERGENCY MEDICINE

## 2019-01-01 PROCEDURE — 74019 RADEX ABDOMEN 2 VIEWS: CPT | Mod: 26,,, | Performed by: RADIOLOGY

## 2019-01-01 PROCEDURE — 82378 CARCINOEMBRYONIC ANTIGEN: CPT

## 2019-01-01 PROCEDURE — 99999 PR PBB SHADOW E&M-EST. PATIENT-LVL V: ICD-10-PCS | Mod: PBBFAC,,, | Performed by: NURSE PRACTITIONER

## 2019-01-01 PROCEDURE — 82803 BLOOD GASES ANY COMBINATION: CPT

## 2019-01-01 PROCEDURE — 97802 MEDICAL NUTRITION INDIV IN: CPT

## 2019-01-01 PROCEDURE — 99213 OFFICE O/P EST LOW 20 MIN: CPT | Mod: PBBFAC,25 | Performed by: INTERNAL MEDICINE

## 2019-01-01 PROCEDURE — 25000242 PHARM REV CODE 250 ALT 637 W/ HCPCS: Performed by: NURSE PRACTITIONER

## 2019-01-01 PROCEDURE — 96360 HYDRATION IV INFUSION INIT: CPT

## 2019-01-01 PROCEDURE — 83605 ASSAY OF LACTIC ACID: CPT

## 2019-01-01 PROCEDURE — 99213 OFFICE O/P EST LOW 20 MIN: CPT | Mod: PBBFAC | Performed by: PODIATRIST

## 2019-01-01 PROCEDURE — 11721 PR DEBRIDEMENT OF NAILS, 6 OR MORE: ICD-10-PCS | Mod: Q9,Q8,S$PBB, | Performed by: PODIATRIST

## 2019-01-01 PROCEDURE — 87088 URINE BACTERIA CULTURE: CPT

## 2019-01-01 PROCEDURE — A9585 GADOBUTROL INJECTION: HCPCS | Performed by: EMERGENCY MEDICINE

## 2019-01-01 PROCEDURE — 93010 EKG 12-LEAD: ICD-10-PCS | Mod: ,,, | Performed by: INTERNAL MEDICINE

## 2019-01-01 PROCEDURE — 99291 CRITICAL CARE FIRST HOUR: CPT | Mod: 25,,, | Performed by: NURSE PRACTITIONER

## 2019-01-01 PROCEDURE — 99204 PR OFFICE/OUTPT VISIT, NEW, LEVL IV, 45-59 MIN: ICD-10-PCS | Mod: S$PBB,,, | Performed by: FAMILY MEDICINE

## 2019-01-01 PROCEDURE — 99215 OFFICE O/P EST HI 40 MIN: CPT | Mod: PBBFAC,25 | Performed by: NURSE PRACTITIONER

## 2019-01-01 PROCEDURE — 88305 TISSUE EXAM BY PATHOLOGIST: CPT | Performed by: PATHOLOGY

## 2019-01-01 PROCEDURE — 99999 PR PBB SHADOW E&M-EST. PATIENT-LVL IV: ICD-10-PCS | Mod: PBBFAC,,, | Performed by: PODIATRIST

## 2019-01-01 PROCEDURE — 88305 TISSUE EXAM BY PATHOLOGIST: ICD-10-PCS | Mod: 26,,, | Performed by: PATHOLOGY

## 2019-01-01 PROCEDURE — 88342 CHG IMMUNOCYTOCHEMISTRY: ICD-10-PCS | Mod: 26,,, | Performed by: PATHOLOGY

## 2019-01-01 PROCEDURE — 87040 BLOOD CULTURE FOR BACTERIA: CPT | Mod: 59

## 2019-01-01 PROCEDURE — 27000190 HC CPAP FULL FACE MASK W/VALVE

## 2019-01-01 PROCEDURE — 99213 OFFICE O/P EST LOW 20 MIN: CPT | Mod: PBBFAC,PO | Performed by: FAMILY MEDICINE

## 2019-01-01 PROCEDURE — S0028 INJECTION, FAMOTIDINE, 20 MG: HCPCS | Performed by: INTERNAL MEDICINE

## 2019-01-01 PROCEDURE — 20000000 HC ICU ROOM

## 2019-01-01 PROCEDURE — 99204 OFFICE O/P NEW MOD 45 MIN: CPT | Mod: S$PBB,,, | Performed by: FAMILY MEDICINE

## 2019-01-01 PROCEDURE — 99205 OFFICE O/P NEW HI 60 MIN: CPT | Mod: S$PBB,,, | Performed by: OBSTETRICS & GYNECOLOGY

## 2019-01-01 PROCEDURE — 11721 DEBRIDE NAIL 6 OR MORE: CPT | Mod: Q9,Q8,S$PBB, | Performed by: PODIATRIST

## 2019-01-01 PROCEDURE — 11721 DEBRIDE NAIL 6 OR MORE: CPT | Mod: Q8,PBBFAC | Performed by: PODIATRIST

## 2019-01-01 PROCEDURE — 93005 ELECTROCARDIOGRAM TRACING: CPT

## 2019-01-01 PROCEDURE — 99291 CRITICAL CARE FIRST HOUR: CPT | Mod: ,,, | Performed by: NURSE PRACTITIONER

## 2019-01-01 PROCEDURE — 96372 THER/PROPH/DIAG INJ SC/IM: CPT

## 2019-01-01 PROCEDURE — 93010 ELECTROCARDIOGRAM REPORT: CPT | Mod: ,,, | Performed by: INTERNAL MEDICINE

## 2019-01-01 PROCEDURE — 99999 PR PBB SHADOW E&M-EST. PATIENT-LVL II: ICD-10-PCS | Mod: PBBFAC,,, | Performed by: OBSTETRICS & GYNECOLOGY

## 2019-01-01 PROCEDURE — 87186 SC STD MICRODIL/AGAR DIL: CPT

## 2019-01-01 PROCEDURE — 84100 ASSAY OF PHOSPHORUS: CPT

## 2019-01-01 PROCEDURE — 99215 OFFICE O/P EST HI 40 MIN: CPT | Mod: PBBFAC | Performed by: NURSE PRACTITIONER

## 2019-01-01 PROCEDURE — 99999 PR PBB SHADOW E&M-EST. PATIENT-LVL III: CPT | Mod: PBBFAC,,, | Performed by: NURSE PRACTITIONER

## 2019-01-01 PROCEDURE — 63600175 PHARM REV CODE 636 W HCPCS: Mod: JG | Performed by: NURSE PRACTITIONER

## 2019-01-01 PROCEDURE — 36620 PR INSERT CATH,ART,PERCUT,SHORTTERM: ICD-10-PCS | Mod: ,,, | Performed by: NURSE PRACTITIONER

## 2019-01-01 PROCEDURE — 82010 KETONE BODYS QUAN: CPT

## 2019-01-01 PROCEDURE — 83036 HEMOGLOBIN GLYCOSYLATED A1C: CPT

## 2019-01-01 PROCEDURE — 25500020 PHARM REV CODE 255: Performed by: INTERNAL MEDICINE

## 2019-01-01 PROCEDURE — 81002 URINALYSIS NONAUTO W/O SCOPE: CPT | Mod: PBBFAC,PO | Performed by: FAMILY MEDICINE

## 2019-01-01 PROCEDURE — 81000 URINALYSIS NONAUTO W/SCOPE: CPT

## 2019-01-01 PROCEDURE — 99212 OFFICE O/P EST SF 10 MIN: CPT | Mod: PBBFAC,27 | Performed by: OBSTETRICS & GYNECOLOGY

## 2019-01-01 PROCEDURE — 11721 DEBRIDE NAIL 6 OR MORE: CPT | Mod: Q9,Q8,PBBFAC,PN | Performed by: PODIATRIST

## 2019-01-01 PROCEDURE — 77012 CT SCAN FOR NEEDLE BIOPSY: CPT | Mod: TC

## 2019-01-01 PROCEDURE — 27100171 HC OXYGEN HIGH FLOW UP TO 24 HOURS

## 2019-01-01 PROCEDURE — 87077 CULTURE AEROBIC IDENTIFY: CPT

## 2019-01-01 PROCEDURE — 74176 CT ABD & PELVIS W/O CONTRAST: CPT | Mod: TC

## 2019-01-01 PROCEDURE — 1126F AMNT PAIN NOTED NONE PRSNT: CPT | Mod: ,,, | Performed by: INTERNAL MEDICINE

## 2019-01-01 PROCEDURE — 74176 CT RENAL STONE STUDY ABD PELVIS WO: ICD-10-PCS | Mod: 26,,, | Performed by: RADIOLOGY

## 2019-01-01 PROCEDURE — 99213 OFFICE O/P EST LOW 20 MIN: CPT | Mod: PBBFAC,27 | Performed by: INTERNAL MEDICINE

## 2019-01-01 PROCEDURE — 87081 CULTURE SCREEN ONLY: CPT

## 2019-01-01 PROCEDURE — 99205 PR OFFICE/OUTPT VISIT, NEW, LEVL V, 60-74 MIN: ICD-10-PCS | Mod: S$PBB,,, | Performed by: OBSTETRICS & GYNECOLOGY

## 2019-01-01 PROCEDURE — 1159F PR MEDICATION LIST DOCUMENTED IN MEDICAL RECORD: ICD-10-PCS | Mod: ,,, | Performed by: INTERNAL MEDICINE

## 2019-01-01 PROCEDURE — 11721 DEBRIDE NAIL 6 OR MORE: CPT | Mod: Q8,S$PBB,, | Performed by: PODIATRIST

## 2019-01-01 PROCEDURE — 11721 DEBRIDE NAIL 6 OR MORE: CPT | Mod: Q9,S$PBB,, | Performed by: PODIATRIST

## 2019-01-01 PROCEDURE — 96413 CHEMO IV INFUSION 1 HR: CPT

## 2019-01-01 PROCEDURE — 99291 PR CRITICAL CARE, E/M 30-74 MINUTES: ICD-10-PCS | Mod: 25,,, | Performed by: NURSE PRACTITIONER

## 2019-01-01 PROCEDURE — 36415 COLL VENOUS BLD VENIPUNCTURE: CPT

## 2019-01-01 PROCEDURE — 37799 UNLISTED PX VASCULAR SURGERY: CPT

## 2019-01-01 PROCEDURE — 99205 PR OFFICE/OUTPT VISIT, NEW, LEVL V, 60-74 MIN: ICD-10-PCS | Mod: S$PBB,,, | Performed by: INTERNAL MEDICINE

## 2019-01-01 PROCEDURE — 99999 PR PBB SHADOW E&M-EST. PATIENT-LVL II: CPT | Mod: PBBFAC,,, | Performed by: OBSTETRICS & GYNECOLOGY

## 2019-01-01 PROCEDURE — 25500020 PHARM REV CODE 255: Performed by: EMERGENCY MEDICINE

## 2019-01-01 PROCEDURE — 82962 GLUCOSE BLOOD TEST: CPT

## 2019-01-01 PROCEDURE — 74019 RADEX ABDOMEN 2 VIEWS: CPT | Mod: TC

## 2019-01-01 PROCEDURE — 88305 TISSUE EXAM BY PATHOLOGIST: CPT | Mod: 26,,, | Performed by: PATHOLOGY

## 2019-01-01 PROCEDURE — 99999 PR PBB SHADOW E&M-EST. PATIENT-LVL III: ICD-10-PCS | Mod: PBBFAC,,, | Performed by: NURSE PRACTITIONER

## 2019-01-01 PROCEDURE — 11721 PR DEBRIDEMENT OF NAILS, 6 OR MORE: ICD-10-PCS | Mod: Q9,S$PBB,, | Performed by: PODIATRIST

## 2019-01-01 PROCEDURE — 1126F PR PAIN SEVERITY QUANTIFIED, NO PAIN PRESENT: ICD-10-PCS | Mod: ,,, | Performed by: INTERNAL MEDICINE

## 2019-01-01 PROCEDURE — 96361 HYDRATE IV INFUSION ADD-ON: CPT

## 2019-01-01 PROCEDURE — 83735 ASSAY OF MAGNESIUM: CPT

## 2019-01-01 PROCEDURE — 27000646 HC AEROBIKA DEVICE

## 2019-01-01 PROCEDURE — 99214 OFFICE O/P EST MOD 30 MIN: CPT | Mod: S$PBB,,, | Performed by: NURSE PRACTITIONER

## 2019-01-01 PROCEDURE — 99213 PR OFFICE/OUTPT VISIT, EST, LEVL III, 20-29 MIN: ICD-10-PCS | Mod: S$PBB,,, | Performed by: NURSE PRACTITIONER

## 2019-01-01 PROCEDURE — 97803 MED NUTRITION INDIV SUBSEQ: CPT

## 2019-01-01 PROCEDURE — 99213 OFFICE O/P EST LOW 20 MIN: CPT | Mod: 25,S$PBB,, | Performed by: PODIATRIST

## 2019-01-01 PROCEDURE — 96375 TX/PRO/DX INJ NEW DRUG ADDON: CPT

## 2019-01-01 PROCEDURE — 99999 PR PBB SHADOW E&M-EST. PATIENT-LVL III: CPT | Mod: PBBFAC,,, | Performed by: FAMILY MEDICINE

## 2019-01-01 PROCEDURE — 99214 OFFICE O/P EST MOD 30 MIN: CPT | Mod: PBBFAC,25 | Performed by: PODIATRIST

## 2019-01-01 PROCEDURE — 63600175 PHARM REV CODE 636 W HCPCS: Performed by: RADIOLOGY

## 2019-01-01 PROCEDURE — 96415 CHEMO IV INFUSION ADDL HR: CPT

## 2019-01-01 PROCEDURE — 97110 THERAPEUTIC EXERCISES: CPT

## 2019-01-01 PROCEDURE — 88341 IMHCHEM/IMCYTCHM EA ADD ANTB: CPT | Performed by: PATHOLOGY

## 2019-01-01 PROCEDURE — 99223 1ST HOSP IP/OBS HIGH 75: CPT | Mod: ,,, | Performed by: PHYSICIAN ASSISTANT

## 2019-01-01 PROCEDURE — 88341 PR IHC OR ICC EACH ADD'L SINGLE ANTIBODY  STAINPR: ICD-10-PCS | Mod: 26,,, | Performed by: PATHOLOGY

## 2019-01-01 PROCEDURE — 99215 OFFICE O/P EST HI 40 MIN: CPT | Mod: S$PBB,,, | Performed by: NURSE PRACTITIONER

## 2019-01-01 PROCEDURE — 25000003 PHARM REV CODE 250

## 2019-01-01 PROCEDURE — 27000221 HC OXYGEN, UP TO 24 HOURS

## 2019-01-01 PROCEDURE — 11721 DEBRIDE NAIL 6 OR MORE: CPT | Mod: Q9,Q8,PBBFAC | Performed by: PODIATRIST

## 2019-01-01 PROCEDURE — 1159F MED LIST DOCD IN RCRD: CPT | Mod: ,,, | Performed by: NURSE PRACTITIONER

## 2019-01-01 PROCEDURE — 88342 IMHCHEM/IMCYTCHM 1ST ANTB: CPT | Mod: 26,,, | Performed by: PATHOLOGY

## 2019-01-01 PROCEDURE — 99213 OFFICE O/P EST LOW 20 MIN: CPT | Mod: PBBFAC | Performed by: NURSE PRACTITIONER

## 2019-01-01 PROCEDURE — 38505 NEEDLE BIOPSY LYMPH NODES: CPT

## 2019-01-01 PROCEDURE — 99213 PR OFFICE/OUTPT VISIT, EST, LEVL III, 20-29 MIN: ICD-10-PCS | Mod: 25,S$PBB,, | Performed by: PODIATRIST

## 2019-01-01 PROCEDURE — 1126F PR PAIN SEVERITY QUANTIFIED, NO PAIN PRESENT: ICD-10-PCS | Mod: ,,, | Performed by: OBSTETRICS & GYNECOLOGY

## 2019-01-01 RX ORDER — DOCUSATE SODIUM 100 MG/1
100 CAPSULE, LIQUID FILLED ORAL 2 TIMES DAILY
Qty: 60 CAPSULE | Refills: 11 | Status: SHIPPED | OUTPATIENT
Start: 2019-01-01 | End: 2019-01-01

## 2019-01-01 RX ORDER — POLYETHYLENE GLYCOL 3350 17 G/17G
17 POWDER, FOR SOLUTION ORAL DAILY
Status: DISCONTINUED | OUTPATIENT
Start: 2019-01-01 | End: 2019-01-01

## 2019-01-01 RX ORDER — ONDANSETRON 2 MG/ML
4 INJECTION INTRAMUSCULAR; INTRAVENOUS EVERY 6 HOURS PRN
Status: DISCONTINUED | OUTPATIENT
Start: 2019-01-01 | End: 2019-01-01

## 2019-01-01 RX ORDER — NITROFURANTOIN 25; 75 MG/1; MG/1
100 CAPSULE ORAL 2 TIMES DAILY
Qty: 14 CAPSULE | Refills: 0 | Status: SHIPPED | OUTPATIENT
Start: 2019-01-01 | End: 2019-01-01

## 2019-01-01 RX ORDER — ONDANSETRON 8 MG/1
8 TABLET, ORALLY DISINTEGRATING ORAL EVERY 12 HOURS PRN
Qty: 10 TABLET | Refills: 0 | Status: ON HOLD | OUTPATIENT
Start: 2019-01-01 | End: 2019-01-01 | Stop reason: HOSPADM

## 2019-01-01 RX ORDER — EPINEPHRINE 1 MG/ML
0.3 INJECTION, SOLUTION INTRACARDIAC; INTRAMUSCULAR; INTRAVENOUS; SUBCUTANEOUS ONCE AS NEEDED
Status: DISCONTINUED | OUTPATIENT
Start: 2019-01-01 | End: 2019-01-01 | Stop reason: HOSPADM

## 2019-01-01 RX ORDER — METOPROLOL SUCCINATE 25 MG/1
25 TABLET, EXTENDED RELEASE ORAL DAILY
Status: DISCONTINUED | OUTPATIENT
Start: 2019-01-01 | End: 2019-01-01

## 2019-01-01 RX ORDER — SODIUM CHLORIDE 9 MG/ML
INJECTION, SOLUTION INTRAVENOUS ONCE
Status: DISCONTINUED | OUTPATIENT
Start: 2019-01-01 | End: 2019-01-01

## 2019-01-01 RX ORDER — INSULIN ASPART 100 [IU]/ML
0-5 INJECTION, SOLUTION INTRAVENOUS; SUBCUTANEOUS
Status: DISCONTINUED | OUTPATIENT
Start: 2019-01-01 | End: 2019-01-01

## 2019-01-01 RX ORDER — ONDANSETRON 8 MG/1
8 TABLET, ORALLY DISINTEGRATING ORAL EVERY 12 HOURS PRN
Status: DISCONTINUED | OUTPATIENT
Start: 2019-01-01 | End: 2019-01-01

## 2019-01-01 RX ORDER — ONDANSETRON 2 MG/ML
8 INJECTION INTRAMUSCULAR; INTRAVENOUS EVERY 6 HOURS PRN
Status: DISCONTINUED | OUTPATIENT
Start: 2019-01-01 | End: 2019-01-01

## 2019-01-01 RX ORDER — MAGNESIUM SULFATE HEPTAHYDRATE 40 MG/ML
2 INJECTION, SOLUTION INTRAVENOUS ONCE
Status: COMPLETED | OUTPATIENT
Start: 2019-01-01 | End: 2019-01-01

## 2019-01-01 RX ORDER — DIPHENHYDRAMINE HYDROCHLORIDE 50 MG/ML
50 INJECTION INTRAMUSCULAR; INTRAVENOUS ONCE AS NEEDED
Status: DISCONTINUED | OUTPATIENT
Start: 2019-01-01 | End: 2019-01-01 | Stop reason: HOSPADM

## 2019-01-01 RX ORDER — PROCHLORPERAZINE EDISYLATE 5 MG/ML
2.5 INJECTION INTRAMUSCULAR; INTRAVENOUS EVERY 4 HOURS PRN
Status: DISCONTINUED | OUTPATIENT
Start: 2019-01-01 | End: 2019-01-01

## 2019-01-01 RX ORDER — IBUPROFEN 200 MG
16 TABLET ORAL
Status: DISCONTINUED | OUTPATIENT
Start: 2019-01-01 | End: 2019-01-01

## 2019-01-01 RX ORDER — MEROPENEM AND SODIUM CHLORIDE 500 MG/50ML
500 INJECTION, SOLUTION INTRAVENOUS
Status: DISCONTINUED | OUTPATIENT
Start: 2019-01-01 | End: 2019-01-01

## 2019-01-01 RX ORDER — FAMOTIDINE 10 MG/ML
20 INJECTION INTRAVENOUS
Status: CANCELLED | OUTPATIENT
Start: 2019-01-01

## 2019-01-01 RX ORDER — DIPHENHYDRAMINE HYDROCHLORIDE 50 MG/ML
50 INJECTION INTRAMUSCULAR; INTRAVENOUS ONCE AS NEEDED
Status: CANCELLED | OUTPATIENT
Start: 2019-01-01

## 2019-01-01 RX ORDER — LEVOFLOXACIN 500 MG/1
500 TABLET, FILM COATED ORAL DAILY
Qty: 7 TABLET | Refills: 0 | Status: SHIPPED | OUTPATIENT
Start: 2019-01-01 | End: 2019-12-22

## 2019-01-01 RX ORDER — ONDANSETRON HYDROCHLORIDE 8 MG/1
8 TABLET, FILM COATED ORAL EVERY 12 HOURS PRN
Qty: 30 TABLET | Refills: 2 | Status: ON HOLD | OUTPATIENT
Start: 2019-01-01 | End: 2019-01-01 | Stop reason: HOSPADM

## 2019-01-01 RX ORDER — GLUCAGON 1 MG
1 KIT INJECTION
Status: DISCONTINUED | OUTPATIENT
Start: 2019-01-01 | End: 2019-01-01

## 2019-01-01 RX ORDER — POTASSIUM CHLORIDE 20 MEQ/15ML
20 SOLUTION ORAL 3 TIMES DAILY
Status: COMPLETED | OUTPATIENT
Start: 2019-01-01 | End: 2019-01-01

## 2019-01-01 RX ORDER — POTASSIUM CHLORIDE 20 MEQ/15ML
25 SOLUTION ORAL ONCE
Status: DISCONTINUED | OUTPATIENT
Start: 2019-01-01 | End: 2019-01-01

## 2019-01-01 RX ORDER — LEVOFLOXACIN 5 MG/ML
750 INJECTION, SOLUTION INTRAVENOUS
Status: COMPLETED | OUTPATIENT
Start: 2019-01-01 | End: 2019-01-01

## 2019-01-01 RX ORDER — ACETAMINOPHEN 325 MG/1
650 TABLET ORAL EVERY 4 HOURS PRN
Status: DISCONTINUED | OUTPATIENT
Start: 2019-01-01 | End: 2019-01-01

## 2019-01-01 RX ORDER — EPINEPHRINE 0.3 MG/.3ML
0.3 INJECTION SUBCUTANEOUS ONCE AS NEEDED
Status: CANCELLED | OUTPATIENT
Start: 2019-01-01

## 2019-01-01 RX ORDER — MAGNESIUM SULFATE HEPTAHYDRATE 40 MG/ML
2 INJECTION, SOLUTION INTRAVENOUS ONCE
Status: DISCONTINUED | OUTPATIENT
Start: 2019-01-01 | End: 2019-01-01

## 2019-01-01 RX ORDER — CLOPIDOGREL BISULFATE 75 MG/1
75 TABLET ORAL DAILY
Status: DISCONTINUED | OUTPATIENT
Start: 2019-01-01 | End: 2019-01-01

## 2019-01-01 RX ORDER — POTASSIUM CHLORIDE 20 MEQ/1
20 TABLET, EXTENDED RELEASE ORAL 3 TIMES DAILY
Status: DISCONTINUED | OUTPATIENT
Start: 2019-01-01 | End: 2019-01-01

## 2019-01-01 RX ORDER — SIMVASTATIN 20 MG/1
20 TABLET, FILM COATED ORAL NIGHTLY
Status: DISCONTINUED | OUTPATIENT
Start: 2019-01-01 | End: 2019-01-01

## 2019-01-01 RX ORDER — FAMOTIDINE 10 MG/ML
20 INJECTION INTRAVENOUS
Status: COMPLETED | OUTPATIENT
Start: 2019-01-01 | End: 2019-01-01

## 2019-01-01 RX ORDER — OXYCODONE HYDROCHLORIDE 5 MG/1
5 CAPSULE ORAL EVERY 6 HOURS PRN
Qty: 30 CAPSULE | Refills: 0 | Status: ON HOLD | OUTPATIENT
Start: 2019-01-01 | End: 2019-01-01 | Stop reason: HOSPADM

## 2019-01-01 RX ORDER — FLUTICASONE PROPIONATE 50 MCG
1 SPRAY, SUSPENSION (ML) NASAL DAILY
Status: DISCONTINUED | OUTPATIENT
Start: 2019-01-01 | End: 2019-01-01

## 2019-01-01 RX ORDER — FAMOTIDINE 20 MG/1
20 TABLET, FILM COATED ORAL 2 TIMES DAILY
Status: DISCONTINUED | OUTPATIENT
Start: 2019-01-01 | End: 2019-01-01

## 2019-01-01 RX ORDER — LORATADINE 10 MG/1
10 TABLET ORAL DAILY
Status: DISCONTINUED | OUTPATIENT
Start: 2019-01-01 | End: 2019-01-01

## 2019-01-01 RX ORDER — SODIUM CHLORIDE 9 MG/ML
INJECTION, SOLUTION INTRAVENOUS CONTINUOUS
Status: DISCONTINUED | OUTPATIENT
Start: 2019-01-01 | End: 2019-01-01

## 2019-01-01 RX ORDER — MORPHINE SULFATE 20 MG/ML
5 SOLUTION ORAL EVERY 4 HOURS PRN
Status: DISCONTINUED | OUTPATIENT
Start: 2019-01-01 | End: 2019-01-01

## 2019-01-01 RX ORDER — SODIUM CHLORIDE 0.9 % (FLUSH) 0.9 %
10 SYRINGE (ML) INJECTION
Status: CANCELLED | OUTPATIENT
Start: 2019-01-01

## 2019-01-01 RX ORDER — SODIUM CHLORIDE 9 MG/ML
INJECTION, SOLUTION INTRAVENOUS ONCE
Status: COMPLETED | OUTPATIENT
Start: 2019-01-01 | End: 2019-01-01

## 2019-01-01 RX ORDER — FAMOTIDINE 10 MG/ML
20 INJECTION INTRAVENOUS 2 TIMES DAILY
Status: DISCONTINUED | OUTPATIENT
Start: 2019-01-01 | End: 2019-01-01

## 2019-01-01 RX ORDER — SENNOSIDES 8.6 MG/1
17.2 TABLET ORAL NIGHTLY
Status: DISCONTINUED | OUTPATIENT
Start: 2019-01-01 | End: 2019-01-01

## 2019-01-01 RX ORDER — FENTANYL CITRATE 50 UG/ML
INJECTION, SOLUTION INTRAMUSCULAR; INTRAVENOUS CODE/TRAUMA/SEDATION MEDICATION
Status: COMPLETED | OUTPATIENT
Start: 2019-01-01 | End: 2019-01-01

## 2019-01-01 RX ORDER — POTASSIUM CHLORIDE 14.9 MG/ML
20 INJECTION INTRAVENOUS ONCE
Status: DISCONTINUED | OUTPATIENT
Start: 2019-01-01 | End: 2019-01-01

## 2019-01-01 RX ORDER — POTASSIUM CHLORIDE 7.45 MG/ML
20 INJECTION INTRAVENOUS ONCE
Status: DISCONTINUED | OUTPATIENT
Start: 2019-01-01 | End: 2019-01-01

## 2019-01-01 RX ORDER — CIPROFLOXACIN 250 MG/1
250 TABLET, FILM COATED ORAL 2 TIMES DAILY
Qty: 14 TABLET | Refills: 0 | Status: SHIPPED | OUTPATIENT
Start: 2019-01-01 | End: 2019-01-01

## 2019-01-01 RX ORDER — LEVOFLOXACIN 5 MG/ML
750 INJECTION, SOLUTION INTRAVENOUS
Status: DISCONTINUED | OUTPATIENT
Start: 2019-01-01 | End: 2019-01-01

## 2019-01-01 RX ORDER — GLYCOPYRROLATE 0.2 MG/ML
0.2 INJECTION INTRAMUSCULAR; INTRAVENOUS
Status: DISCONTINUED | OUTPATIENT
Start: 2019-01-01 | End: 2019-01-01 | Stop reason: HOSPADM

## 2019-01-01 RX ORDER — FUROSEMIDE 10 MG/ML
40 INJECTION INTRAMUSCULAR; INTRAVENOUS ONCE
Status: COMPLETED | OUTPATIENT
Start: 2019-01-01 | End: 2019-01-01

## 2019-01-01 RX ORDER — IPRATROPIUM BROMIDE AND ALBUTEROL SULFATE 2.5; .5 MG/3ML; MG/3ML
3 SOLUTION RESPIRATORY (INHALATION) EVERY 4 HOURS PRN
Status: DISCONTINUED | OUTPATIENT
Start: 2019-01-01 | End: 2019-01-01

## 2019-01-01 RX ORDER — POTASSIUM CHLORIDE 7.45 MG/ML
10 INJECTION INTRAVENOUS
Status: DISCONTINUED | OUTPATIENT
Start: 2019-01-01 | End: 2019-01-01

## 2019-01-01 RX ORDER — ASPIRIN 325 MG
325 TABLET ORAL DAILY
Status: DISCONTINUED | OUTPATIENT
Start: 2019-01-01 | End: 2019-01-01

## 2019-01-01 RX ORDER — ACETAMINOPHEN 325 MG/1
650 TABLET ORAL EVERY 6 HOURS PRN
Status: DISCONTINUED | OUTPATIENT
Start: 2019-01-01 | End: 2019-01-01

## 2019-01-01 RX ORDER — POLYETHYLENE GLYCOL 3350 17 G/17G
17 POWDER, FOR SOLUTION ORAL DAILY
Status: DISCONTINUED | OUTPATIENT
Start: 2019-01-01 | End: 2019-01-01 | Stop reason: SDUPTHER

## 2019-01-01 RX ORDER — SENNOSIDES 8.6 MG/1
8.6 TABLET ORAL NIGHTLY
Status: DISCONTINUED | OUTPATIENT
Start: 2019-01-01 | End: 2019-01-01 | Stop reason: HOSPADM

## 2019-01-01 RX ORDER — MIDAZOLAM HYDROCHLORIDE 1 MG/ML
INJECTION INTRAMUSCULAR; INTRAVENOUS CODE/TRAUMA/SEDATION MEDICATION
Status: COMPLETED | OUTPATIENT
Start: 2019-01-01 | End: 2019-01-01

## 2019-01-01 RX ORDER — NITROGLYCERIN 0.4 MG/1
0.4 TABLET SUBLINGUAL EVERY 5 MIN PRN
Status: DISCONTINUED | OUTPATIENT
Start: 2019-01-01 | End: 2019-01-01

## 2019-01-01 RX ORDER — NOREPINEPHRINE BITARTRATE/D5W 4MG/250ML
0.04 PLASTIC BAG, INJECTION (ML) INTRAVENOUS CONTINUOUS
Status: DISCONTINUED | OUTPATIENT
Start: 2019-01-01 | End: 2019-01-01

## 2019-01-01 RX ORDER — POLYETHYLENE GLYCOL 3350 17 G/17G
17 POWDER, FOR SOLUTION ORAL DAILY
Qty: 510 G | Refills: 3 | Status: ON HOLD | OUTPATIENT
Start: 2019-01-01 | End: 2019-01-01 | Stop reason: HOSPADM

## 2019-01-01 RX ORDER — DEXAMETHASONE 4 MG/1
20 TABLET ORAL EVERY 6 HOURS
Qty: 10 TABLET | Refills: 5 | Status: SHIPPED | OUTPATIENT
Start: 2019-01-01 | End: 2019-01-01

## 2019-01-01 RX ORDER — IBUPROFEN 200 MG
24 TABLET ORAL
Status: DISCONTINUED | OUTPATIENT
Start: 2019-01-01 | End: 2019-01-01

## 2019-01-01 RX ORDER — METFORMIN HYDROCHLORIDE 500 MG/1
500 TABLET ORAL 3 TIMES DAILY
Qty: 60 TABLET | Refills: 0 | Status: SHIPPED | OUTPATIENT
Start: 2019-01-01

## 2019-01-01 RX ORDER — HALOPERIDOL 5 MG/ML
2 INJECTION INTRAMUSCULAR EVERY 4 HOURS PRN
Status: DISCONTINUED | OUTPATIENT
Start: 2019-01-01 | End: 2019-01-01 | Stop reason: HOSPADM

## 2019-01-01 RX ORDER — MORPHINE SULFATE 2 MG/ML
2 INJECTION, SOLUTION INTRAMUSCULAR; INTRAVENOUS EVERY 4 HOURS PRN
Status: DISCONTINUED | OUTPATIENT
Start: 2019-01-01 | End: 2019-01-01 | Stop reason: HOSPADM

## 2019-01-01 RX ORDER — HEPARIN 100 UNIT/ML
500 SYRINGE INTRAVENOUS
Status: CANCELLED | OUTPATIENT
Start: 2019-01-01

## 2019-01-01 RX ORDER — NOREPINEPHRINE BITARTRATE/D5W 4MG/250ML
PLASTIC BAG, INJECTION (ML) INTRAVENOUS
Status: COMPLETED
Start: 2019-01-01 | End: 2019-01-01

## 2019-01-01 RX ORDER — MORPHINE SULFATE 2 MG/ML
2 INJECTION, SOLUTION INTRAMUSCULAR; INTRAVENOUS
Status: DISCONTINUED | OUTPATIENT
Start: 2019-01-01 | End: 2019-01-01 | Stop reason: HOSPADM

## 2019-01-01 RX ORDER — NYSTATIN 100000 [USP'U]/ML
500000 SUSPENSION ORAL 4 TIMES DAILY
Qty: 473 ML | Refills: 0 | Status: SHIPPED | OUTPATIENT
Start: 2019-01-01 | End: 2019-12-25

## 2019-01-01 RX ORDER — OXYCODONE HYDROCHLORIDE 5 MG/1
5 TABLET ORAL EVERY 4 HOURS PRN
Status: DISCONTINUED | OUTPATIENT
Start: 2019-01-01 | End: 2019-01-01

## 2019-01-01 RX ORDER — FUROSEMIDE 10 MG/ML
20 INJECTION INTRAMUSCULAR; INTRAVENOUS 2 TIMES DAILY
Status: DISCONTINUED | OUTPATIENT
Start: 2019-01-01 | End: 2019-01-01

## 2019-01-01 RX ORDER — GADOBUTROL 604.72 MG/ML
10 INJECTION INTRAVENOUS
Status: COMPLETED | OUTPATIENT
Start: 2019-01-01 | End: 2019-01-01

## 2019-01-01 RX ORDER — AMLODIPINE BESYLATE 5 MG/1
5 TABLET ORAL DAILY
Status: DISCONTINUED | OUTPATIENT
Start: 2019-01-01 | End: 2019-01-01

## 2019-01-01 RX ORDER — SODIUM CHLORIDE 0.9 % (FLUSH) 0.9 %
10 SYRINGE (ML) INJECTION
Status: DISCONTINUED | OUTPATIENT
Start: 2019-01-01 | End: 2019-01-01

## 2019-01-01 RX ADMIN — SODIUM CHLORIDE: 0.9 INJECTION, SOLUTION INTRAVENOUS at 02:12

## 2019-01-01 RX ADMIN — Medication 0.04 MCG/KG/MIN: at 06:12

## 2019-01-01 RX ADMIN — LINACLOTIDE 290 MCG: 145 CAPSULE, GELATIN COATED ORAL at 09:12

## 2019-01-01 RX ADMIN — FLUTICASONE PROPIONATE 50 MCG: 50 SPRAY, METERED NASAL at 09:12

## 2019-01-01 RX ADMIN — INSULIN ASPART 1 UNITS: 100 INJECTION, SOLUTION INTRAVENOUS; SUBCUTANEOUS at 09:12

## 2019-01-01 RX ADMIN — POTASSIUM CHLORIDE 20 MEQ: 20 SOLUTION ORAL at 03:12

## 2019-01-01 RX ADMIN — IOHEXOL 100 ML: 350 INJECTION, SOLUTION INTRAVENOUS at 01:11

## 2019-01-01 RX ADMIN — FENTANYL CITRATE 25 MCG: 50 INJECTION, SOLUTION INTRAMUSCULAR; INTRAVENOUS at 10:11

## 2019-01-01 RX ADMIN — MEROPENEM AND SODIUM CHLORIDE 500 MG: 500 INJECTION, SOLUTION INTRAVENOUS at 04:12

## 2019-01-01 RX ADMIN — AMLODIPINE BESYLATE 5 MG: 5 TABLET ORAL at 09:12

## 2019-01-01 RX ADMIN — LIDOCAINE HYDROCHLORIDE 15 ML: 20 SOLUTION ORAL; TOPICAL at 09:12

## 2019-01-01 RX ADMIN — IPRATROPIUM BROMIDE AND ALBUTEROL SULFATE 3 ML: .5; 3 SOLUTION RESPIRATORY (INHALATION) at 01:12

## 2019-01-01 RX ADMIN — DIPHENHYDRAMINE HYDROCHLORIDE 50 MG: 50 INJECTION, SOLUTION INTRAMUSCULAR; INTRAVENOUS at 12:12

## 2019-01-01 RX ADMIN — MORPHINE SULFATE 2 MG: 2 INJECTION, SOLUTION INTRAMUSCULAR; INTRAVENOUS at 10:12

## 2019-01-01 RX ADMIN — LIDOCAINE HYDROCHLORIDE 15 ML: 20 SOLUTION ORAL; TOPICAL at 08:12

## 2019-01-01 RX ADMIN — MAGNESIUM SULFATE 2 G: 2 INJECTION INTRAVENOUS at 08:12

## 2019-01-01 RX ADMIN — ASPIRIN 325 MG ORAL TABLET 325 MG: 325 PILL ORAL at 08:12

## 2019-01-01 RX ADMIN — CLOPIDOGREL BISULFATE 75 MG: 75 TABLET ORAL at 08:12

## 2019-01-01 RX ADMIN — VANCOMYCIN HYDROCHLORIDE 2500 MG: 1 INJECTION, POWDER, LYOPHILIZED, FOR SOLUTION INTRAVENOUS at 04:12

## 2019-01-01 RX ADMIN — FAMOTIDINE 20 MG: 20 TABLET ORAL at 09:12

## 2019-01-01 RX ADMIN — MORPHINE SULFATE 2 MG: 2 INJECTION, SOLUTION INTRAMUSCULAR; INTRAVENOUS at 07:12

## 2019-01-01 RX ADMIN — INSULIN ASPART 2 UNITS: 100 INJECTION, SOLUTION INTRAVENOUS; SUBCUTANEOUS at 11:12

## 2019-01-01 RX ADMIN — FUROSEMIDE 20 MG: 10 INJECTION, SOLUTION INTRAMUSCULAR; INTRAVENOUS at 11:12

## 2019-01-01 RX ADMIN — LEVOFLOXACIN 750 MG: 750 INJECTION, SOLUTION INTRAVENOUS at 02:12

## 2019-01-01 RX ADMIN — GADOBUTROL 8 ML: 604.72 INJECTION INTRAVENOUS at 04:12

## 2019-01-01 RX ADMIN — LINACLOTIDE 290 MCG: 145 CAPSULE, GELATIN COATED ORAL at 08:12

## 2019-01-01 RX ADMIN — MIDAZOLAM HYDROCHLORIDE 0.5 MG: 1 INJECTION, SOLUTION INTRAMUSCULAR; INTRAVENOUS at 10:11

## 2019-01-01 RX ADMIN — ASPIRIN 325 MG ORAL TABLET 325 MG: 325 PILL ORAL at 09:12

## 2019-01-01 RX ADMIN — ONDANSETRON 4 MG: 2 INJECTION INTRAMUSCULAR; INTRAVENOUS at 02:12

## 2019-01-01 RX ADMIN — INSULIN ASPART 3 UNITS: 100 INJECTION, SOLUTION INTRAVENOUS; SUBCUTANEOUS at 11:12

## 2019-01-01 RX ADMIN — SODIUM CHLORIDE: 0.9 INJECTION, SOLUTION INTRAVENOUS at 11:12

## 2019-01-01 RX ADMIN — Medication 0.04 MCG/KG/MIN: at 04:12

## 2019-01-01 RX ADMIN — FAMOTIDINE 20 MG: 20 TABLET ORAL at 08:12

## 2019-01-01 RX ADMIN — INSULIN ASPART 1 UNITS: 100 INJECTION, SOLUTION INTRAVENOUS; SUBCUTANEOUS at 10:12

## 2019-01-01 RX ADMIN — SODIUM CHLORIDE, SODIUM LACTATE, POTASSIUM CHLORIDE, AND CALCIUM CHLORIDE 250 ML: .6; .31; .03; .02 INJECTION, SOLUTION INTRAVENOUS at 06:12

## 2019-01-01 RX ADMIN — TBO-FILGRASTIM 480 MCG: 480 INJECTION, SOLUTION SUBCUTANEOUS at 08:12

## 2019-01-01 RX ADMIN — FAMOTIDINE 20 MG: 10 INJECTION INTRAVENOUS at 12:12

## 2019-01-01 RX ADMIN — LEVOFLOXACIN 750 MG: 750 INJECTION, SOLUTION INTRAVENOUS at 09:12

## 2019-01-01 RX ADMIN — CLOPIDOGREL BISULFATE 75 MG: 75 TABLET ORAL at 09:12

## 2019-01-01 RX ADMIN — AMLODIPINE BESYLATE 5 MG: 5 TABLET ORAL at 08:12

## 2019-01-01 RX ADMIN — LORATADINE 10 MG: 10 TABLET ORAL at 09:12

## 2019-01-01 RX ADMIN — SODIUM CHLORIDE 500 ML: 0.9 INJECTION, SOLUTION INTRAVENOUS at 12:12

## 2019-01-01 RX ADMIN — INSULIN ASPART 2 UNITS: 100 INJECTION, SOLUTION INTRAVENOUS; SUBCUTANEOUS at 05:12

## 2019-01-01 RX ADMIN — PALONOSETRON HYDROCHLORIDE: 0.25 INJECTION, SOLUTION INTRAVENOUS at 12:12

## 2019-01-01 RX ADMIN — FOLIC ACID: 5 INJECTION, SOLUTION INTRAMUSCULAR; INTRAVENOUS; SUBCUTANEOUS at 08:12

## 2019-01-01 RX ADMIN — METOPROLOL SUCCINATE 12.5 MG: 25 TABLET, EXTENDED RELEASE ORAL at 09:12

## 2019-01-01 RX ADMIN — LORATADINE 10 MG: 10 TABLET ORAL at 03:12

## 2019-01-01 RX ADMIN — SODIUM CHLORIDE: 0.9 INJECTION, SOLUTION INTRAVENOUS at 12:12

## 2019-01-01 RX ADMIN — ONDANSETRON 8 MG: 2 INJECTION INTRAMUSCULAR; INTRAVENOUS at 08:12

## 2019-01-01 RX ADMIN — FUROSEMIDE 40 MG: 10 INJECTION, SOLUTION INTRAMUSCULAR; INTRAVENOUS at 12:12

## 2019-01-01 RX ADMIN — APREPITANT 130 MG: 130 INJECTION, EMULSION INTRAVENOUS at 01:12

## 2019-01-01 RX ADMIN — PROMETHAZINE HYDROCHLORIDE 6.25 MG: 25 INJECTION INTRAMUSCULAR; INTRAVENOUS at 12:12

## 2019-01-01 RX ADMIN — SIMVASTATIN 20 MG: 20 TABLET, FILM COATED ORAL at 09:12

## 2019-01-01 RX ADMIN — CARBOPLATIN 520 MG: 10 INJECTION, SOLUTION INTRAVENOUS at 05:12

## 2019-01-01 RX ADMIN — POTASSIUM CHLORIDE 20 MEQ: 20 SOLUTION ORAL at 08:12

## 2019-01-01 RX ADMIN — LIDOCAINE HYDROCHLORIDE 15 ML: 20 SOLUTION ORAL; TOPICAL at 03:12

## 2019-01-01 RX ADMIN — LORAZEPAM 0.5 MG: 2 INJECTION INTRAMUSCULAR at 10:12

## 2019-01-01 RX ADMIN — METOPROLOL SUCCINATE 12.5 MG: 25 TABLET, EXTENDED RELEASE ORAL at 08:12

## 2019-01-01 RX ADMIN — PROMETHAZINE HYDROCHLORIDE 6.25 MG: 25 INJECTION INTRAMUSCULAR; INTRAVENOUS at 10:12

## 2019-01-01 RX ADMIN — LEVOFLOXACIN 750 MG: 750 INJECTION, SOLUTION INTRAVENOUS at 01:12

## 2019-01-01 RX ADMIN — TBO-FILGRASTIM 480 MCG: 480 INJECTION, SOLUTION SUBCUTANEOUS at 01:12

## 2019-01-01 RX ADMIN — INSULIN ASPART 1 UNITS: 100 INJECTION, SOLUTION INTRAVENOUS; SUBCUTANEOUS at 08:12

## 2019-01-01 RX ADMIN — LIDOCAINE HYDROCHLORIDE 15 ML: 20 SOLUTION ORAL; TOPICAL at 05:12

## 2019-01-01 RX ADMIN — FLUTICASONE PROPIONATE 50 MCG: 50 SPRAY, METERED NASAL at 08:12

## 2019-01-01 RX ADMIN — SIMVASTATIN 20 MG: 20 TABLET, FILM COATED ORAL at 08:12

## 2019-01-01 RX ADMIN — MEROPENEM AND SODIUM CHLORIDE 500 MG: 500 INJECTION, SOLUTION INTRAVENOUS at 10:12

## 2019-01-01 RX ADMIN — ONDANSETRON 4 MG: 2 INJECTION INTRAMUSCULAR; INTRAVENOUS at 07:12

## 2019-01-01 RX ADMIN — FOLIC ACID: 5 INJECTION, SOLUTION INTRAMUSCULAR; INTRAVENOUS; SUBCUTANEOUS at 09:12

## 2019-01-01 RX ADMIN — IPRATROPIUM BROMIDE AND ALBUTEROL SULFATE 3 ML: .5; 3 SOLUTION RESPIRATORY (INHALATION) at 12:12

## 2019-01-01 RX ADMIN — TBO-FILGRASTIM 480 MCG: 480 INJECTION, SOLUTION SUBCUTANEOUS at 09:12

## 2019-01-01 RX ADMIN — LIDOCAINE HYDROCHLORIDE 15 ML: 20 SOLUTION ORAL; TOPICAL at 04:12

## 2019-01-01 RX ADMIN — POLYETHYLENE GLYCOL (3350) 17 G: 17 POWDER, FOR SOLUTION ORAL at 09:12

## 2019-01-01 RX ADMIN — LORATADINE 10 MG: 10 TABLET ORAL at 08:12

## 2019-01-01 RX ADMIN — SODIUM CHLORIDE 1000 ML: 0.9 INJECTION, SOLUTION INTRAVENOUS at 11:12

## 2019-01-01 RX ADMIN — TBO-FILGRASTIM 480 MCG: 480 INJECTION, SOLUTION SUBCUTANEOUS at 10:12

## 2019-01-01 RX ADMIN — INSULIN ASPART 2 UNITS: 100 INJECTION, SOLUTION INTRAVENOUS; SUBCUTANEOUS at 04:12

## 2019-01-01 RX ADMIN — POTASSIUM PHOSPHATE, MONOBASIC AND POTASSIUM PHOSPHATE, DIBASIC 20 MMOL: 224; 236 INJECTION, SOLUTION, CONCENTRATE INTRAVENOUS at 08:12

## 2019-01-01 RX ADMIN — IOHEXOL 30 ML: 350 INJECTION, SOLUTION INTRAVENOUS at 01:11

## 2019-01-01 RX ADMIN — FAMOTIDINE 20 MG: 10 INJECTION INTRAVENOUS at 09:12

## 2019-01-01 RX ADMIN — POTASSIUM CHLORIDE 20 MEQ: 20 SOLUTION ORAL at 09:12

## 2019-01-01 RX ADMIN — PACLITAXEL 276 MG: 6 INJECTION, SOLUTION INTRAVENOUS at 01:12

## 2019-02-07 NOTE — PROGRESS NOTES
"PODIATRY NOTE  PCP: Dr. Gómez Najera MD, MD    CHIEF COMPLAINT   Chief Complaint   Patient presents with    Routine Foot Care     PCP: Dr. Najera last seen 8/20/18       HPI  Jennifer Zhu is a 78 y.o. female who has a past medical history of Arthritis, Cataract, Diabetes mellitus, type 2, Hyperlipidemia, and Hypertension.  Jennifer presents to clinic today for at risk nail care. Pt complains about thickened elongated painful toenails. She states relief is obtained with nail trimming.  Patient denies other pedal complaints at this time.    No results found for: HGBA1C    REVIEW OF SYSTEMS  General: Denies any fever or chills  Chest: Denies shortness of breath, wheezing, coughing, or sputum production  Heart: Denies chest pain, cold extremities, orthopenia, or reduced exercise tolerance  As noted above and per history of current illness above, otherwise negative in the remainder of the 14 systems.     PHYSICAL EXAM  Vitals:    02/07/19 1050   BP: (!) 144/78   Pulse: 74   Weight: 98.2 kg (216 lb 7.9 oz)   Height: 5' 5" (1.651 m)       General: This patient is well-developed, well-nourished and appears stated age, well-oriented to person, place and time, and cooperative and pleasant on today's visit      LOWER EXTREMITY  Vascular exam:   Dorsalis pedis and posterior tibial pulses palpable 1/4 bilaterally.   Capillary refill time immediate to the toes.   Feet are warm to the touch. Skin temperature warm to warm from proximally to distally   There are no varicosities, telangiectasias noted to bilateral foot and ankle regions.   There are no ecchymoses noted to bilateral foot and ankle regions.   There is gross lower extremity edema LEFT>right     Dermatologic exam:   Skin moist with healthy texture and turgor.  There are no open ulcerations, lacerations, or fissures to bilateral foot and ankle regions. There are no signs of infection as there is no erythema, no proximal-extending lymphangiitis, no fluctuance, or " crepitus noted on palpation to bilateral foot and ankle regions.   There is no interdigital maceration.   Nails are thickened elongated x 10.    Neurologic exam:  Epicritic sensation is intact as the patient is able to sense light touch to bilateral foot and ankle regions.   Achilles and patellar deep tendon reflexes intact  Babinski reflex absent    Musculoskeletal/Orthopedic exam:   No symptomatic structural abnormalities noted  There is MAXIMALLY pronated foot type with RCSP eversion noted  Muscle strength AT/EHL/EDL/PT: 5/5; Achilles/Gastroc/Soleus: 5/5; PB/PL: 5/5 Muscle tone is normal.  Ankle joint ROM  B/L supple DF/PF, non-crepitus        ASSESSMENT  Encounter Diagnoses   Name Primary?    Dermatophytosis Yes    Arterial insufficiency     Type 2 diabetes mellitus without complication, without long-term current use of insulin     Corn or callus          PLAN    1. Patient was educated about clinical and imaging findings, and verbalizes understanding of above.  2. With patient's permission, nails were aggressively reduced and debrided x 10 to their soft tissue attachment mechanically and with electric , removing all offending nail and debris. Patient relates relief following the procedure.  -With patient's permission via verbal consent, the involved area was cleansed with an alcohol swab.  Patient relates relief following the procedure. Patient will continue to monitor the areas daily, inspect feet, wear protective shoe gear when ambulatory, moisturizer to maintain skin integrity.      Report Electronically Signed By:  Selena De La Rosa DPM   Podiatric Medicine & Surgery  [facility]  2/7/2019

## 2019-05-01 NOTE — TELEPHONE ENCOUNTER
Left message requesting call back to reschedule pt appointment with Dr. De La Rosa on 5/16/19. Pt is to be scheduled at next available appointment in podiatry.

## 2019-06-17 NOTE — PROGRESS NOTES
Subjective:     Patient ID: Jennifer Zhu is a 78 y.o. female.    Chief Complaint: Nail Care (RNC. Diabetic Pt. Pt wearing casusual shoes w/ socks. PCP Dr Najera) and Foot Pain (Pt states she's having pain going across L Foot. Rates pain 6/10)    Jennifer is a 78 y.o. female who presents to the clinic for evaluation and treatment of high risk feet. Jennifer has a past medical history of Arthritis, Cataract, Diabetes mellitus, type 2, Hyperlipidemia, and Hypertension. The patient's chief complaint is long, thick toenails. This patient has documented high risk feet requiring routine maintenance secondary to diabetes mellitis and those secondary complications of diabetes, as mentioned..    PCP: Gómez Najera MD, MD    Date Last Seen by PCP: 3/23/19    Current shoe gear:  Affected Foot: Casual shoes     Unaffected Foot: Casual shoes    No results found for: HGBA1C        Patient Active Problem List   Diagnosis    Coronary artery disease involving native coronary artery without angina pectoris    Diabetes mellitus    Essential hypertension    S/P coronary artery stent placement       Medication List with Changes/Refills   Current Medications    AMLODIPINE (NORVASC) 5 MG TABLET    Take 5 mg by mouth.    ASPIRIN 325 MG TABLET    Take 325 mg by mouth.    BLOOD SUGAR DIAGNOSTIC (ONETOUCH ULTRA TEST) STRP    1 boxes by Misc.(Non-Drug; Combo Route) route daily.    CLOPIDOGREL (PLAVIX) 75 MG TABLET    Take 75 mg by mouth.    DICLOFENAC SODIUM (VOLTAREN) 1 % GEL    Apply 2 g topically once daily.    ESTRACE 0.01 % (0.1 MG/GRAM) VAGINAL CREAM    USE AS DIRECTED VAGINALLY ONE TO TWO TIMES PER WEEK AS DIRECTED    FLUTICASONE (FLONASE) 50 MCG/ACTUATION NASAL SPRAY    SHAKE WELL USE 1 SQUIRT IN EACH NOSTRIL ONCE DAILY FOR NASAL ALLERGY SYMPTOMS    FUROSEMIDE (LASIX) 20 MG TABLET    Take 1-2 tablet by mouth  per day for fluid and blood pressure    LANCETS (ONETOUCH ULTRASOFT LANCETS) MISC    Check BS bid    LINACLOTIDE (LINZESS) 145  MCG CAP CAPSULE    Take 90 mcg by mouth.     LORATADINE (CLARITIN) 10 MG TABLET    Take 10 mg by mouth.    METFORMIN (GLUCOPHAGE) 500 MG TABLET    TAKE 1 TABLET BY MOUTH TWICE DAILY AS DIRECTED FOR SUGAR DIABETES TAKE WITH FOOD    METOPROLOL SUCCINATE (TOPROL-XL) 25 MG 24 HR TABLET    Take 1/2 tab po Bid.    NITROGLYCERIN (NITROSTAT) 0.4 MG SL TABLET    Place 0.4 mg under the tongue.    OMEPRAZOLE (PRILOSEC) 20 MG CAPSULE    Take 20 mg by mouth.    -PROPYLENE GLYCOL, PF, 0.4-0.3 % DPET    Apply 1 drop to eye.    POLYETHYLENE GLYCOL (GLYCOLAX) 17 GRAM PWPK    Take 17 g by mouth.    POTASSIUM CHLORIDE (KLOR-CON) 10 MEQ TBSR    TAKE ONE (1) TABLET BY MOUTH TWICE A DAY WITH A FULL GLASS OF WATER FOR POTASSIUM    PROPYLENE GLYCOL/ (SYSTANE ULTRA OPHT)    Apply 1 drop to eye every 2 (two) hours.    SIMVASTATIN (ZOCOR) 20 MG TABLET    Take 20 mg by mouth.    TRAVOPROST (TRAVATAN Z) 0.004 % DROP        TRIAMCINOLONE ACETONIDE 0.1% (KENALOG) 0.1 % OINTMENT    Apply 1 applicator topically.       Review of patient's allergies indicates:   Allergen Reactions    Lisinopril Swelling     Lips swelling    Penicillins Anaphylaxis    Sulfa (sulfonamide antibiotics) Hives       Past Surgical History:   Procedure Laterality Date    bladder lift      CARDIAC SURGERY      Triple by pass    INTRAOCULAR LENS INSERTION      left eye       History reviewed. No pertinent family history.    Social History     Socioeconomic History    Marital status:      Spouse name: Not on file    Number of children: Not on file    Years of education: Not on file    Highest education level: Not on file   Occupational History    Not on file   Social Needs    Financial resource strain: Not on file    Food insecurity:     Worry: Not on file     Inability: Not on file    Transportation needs:     Medical: Not on file     Non-medical: Not on file   Tobacco Use    Smoking status: Never Smoker    Smokeless tobacco: Never Used  "  Substance and Sexual Activity    Alcohol use: No    Drug use: No    Sexual activity: Not on file   Lifestyle    Physical activity:     Days per week: Not on file     Minutes per session: Not on file    Stress: Not on file   Relationships    Social connections:     Talks on phone: Not on file     Gets together: Not on file     Attends Spiritism service: Not on file     Active member of club or organization: Not on file     Attends meetings of clubs or organizations: Not on file     Relationship status: Not on file   Other Topics Concern    Not on file   Social History Narrative    Not on file       Vitals:    06/05/19 1504   BP: (!) 146/72   Pulse: 84   Weight: 98.2 kg (216 lb 7.9 oz)   Height: 5' 5" (1.651 m)   PainSc:   6   PainLoc: Foot       No results found for: HGBA1C    Review of Systems   Constitutional: Negative for chills and fever.   Respiratory: Negative for shortness of breath.    Cardiovascular: Positive for leg swelling. Negative for chest pain, palpitations, orthopnea and claudication.   Gastrointestinal: Negative for diarrhea, nausea and vomiting.   Musculoskeletal: Negative for joint pain.   Skin: Negative for rash.   Neurological: Negative for dizziness, tingling, sensory change, focal weakness and weakness.   Psychiatric/Behavioral: Negative.              Objective:   PHYSICAL EXAM: Apperance: Alert and orient in no distress,well developed, and with good attention to grooming and body habits  Patient presents ambulating in casual shoes.   LOWER EXTREMITY EXAM:  VASCULAR: Dorsalis pedis pulses 1/4 bilateral and Posterior Tibial pulses 0/4 left 1/4 right. Capillary fill time <4 seconds bilateral. Mild edema observed bilateral. Varicosities present bilateral. Skin temperature of the lower extremities is warm to cool, proximal to distal. Hair growth dim bilateral.  DERMATOLOGICAL: No skin rashes, subcutaneous nodules, lesions, or ulcers observed bilateral. Nails 1,2,3,4,5 bilateral " elongated, thickened, and discolored with subungual debris. Webspaces 1,2,3,4 bilateral clean, dry and without evidence of break in skin integrity.   NEUROLOGICAL: Light touch, sharp-dull, proprioception all present and equal bilaterally.  Vibratory sensation diminished. Protective sensation decreased sites as tested with a [place] 5.07 monofilament.   MUSCULOSKELETAL: Muscle strength is 5/5 for foot inverters, everters, plantarflexors, and dorsiflexors. Muscle tone is normal. No pain on palpation of left dorsal foot or midfoot.    Assessment:   Dermatophytosis of nail    Arterial insufficiency    Type 2 diabetes mellitus without complication, without long-term current use of insulin          Plan:   Dermatophytosis of nail    Arterial insufficiency    Type 2 diabetes mellitus without complication, without long-term current use of insulin      I counseled the patient on her conditions, regarding findings of my examination, my impressions, and usual treatment plan.   Greater than 50% of this visit spent on counseling and coordination of care.  Greater than 15 minutes of a 20 minute appointment spent on education about the diabetic foot, neuropathy, and prevention of limb loss.  Shoe inspection. Diabetic Foot Education. Patient reminded of the importance of good nutrition and blood sugar control to help prevent podiatric complications of diabetes. Patient instructed on proper foot hygeine. We discussed wearing proper shoe gear, daily foot inspections, never walking without protective shoe gear, never putting sharp instruments to feet.    With patient's permission, nails 1-5 bilateral were debrided/trimmed in length and thickness aggressively to their soft tissue attachment mechanically and with electric , removing all offending nail and debris. Patient relates relief following the procedure.  Patient counseled on ways to improve swelling in lower extremities including decreasing/eliminating salt  intake, elevating lower extremities, compression stocking, or oral medications. Patient states she understands.   Patient  will continue to monitor the areas daily, inspect feet, wear protective shoe gear when ambulatory, moisturizer to maintain skin integrity. Patient reminded of the importance of good nutrition and blood sugar control to help prevent podiatric complications of diabetes.  Patient to return 6 months or sooner if needed.                 Wes Lin DPM  [facility]

## 2019-09-03 NOTE — PROGRESS NOTES
CHIEF COMPLAINT:  This is a 78-year-old female complaining of left lower quadrant pain.    SUBJECTIVE:  The patient complains of a 2-3 month history of intermittent pain in left upper abdomen.  She was seen by her PCP in June told she had a strained muscle.  Pain has not improved.  She rates the pain 7/10 on the pain scale but cannot describe the quality of the pain. The pain is worse when she gets up and down from a seated position.  Pain improves temporarily after bowel movement.  She has a history of chronic constipation and takes Linzess with partial improvement.  Bowel movements consult times be hard and difficult to pass.  She denies bright red blood per rectum or melena.  Patient denies nausea, vomiting or anorexia.  She denies fever, chills, dysuria, frequency or hematuria.  Patient takes aspirin 325 mg daily and Gas-X as needed.  Patient takes ranitidine 150 mg twice daily for GERD.  She previously took omeprazole.      Patient has type 2 diabetes for which she takes metformin.  Last A1c was 7.6% 1 week ago.  She has hypertension which is controlled on amlodipine 5 mg and metoprolol XL 25 mg daily. The patient has CAD and is status post stent placement.  She has pessary in place for uterine prolapse.  She just completed a 5 day course of nitrofurantoin for abnormal urine findings after being seen at [facility] for removal and cleaning of pessary.      Past Medical History:   Diagnosis Date    Arthritis     Diabetes mellitus, type 2     GERD (gastroesophageal reflux disease)     Hyperlipidemia     Hypertension     Uterine prolapse        Past Surgical History:   Procedure Laterality Date    BLADDER SUSPENSION      CORONARY ARTERY BYPASS GRAFT      x3    INTRAOCULAR LENS INSERTION      left eye       Social History     Socioeconomic History    Marital status:      Spouse name: Not on file    Number of children: Not on file    Years of education: Not on file    Highest education  level: Not on file   Occupational History    Not on file   Social Needs    Financial resource strain: Not on file    Food insecurity:     Worry: Not on file     Inability: Not on file    Transportation needs:     Medical: Not on file     Non-medical: Not on file   Tobacco Use    Smoking status: Never Smoker    Smokeless tobacco: Never Used   Substance and Sexual Activity    Alcohol use: No    Drug use: No    Sexual activity: Not Currently   Lifestyle    Physical activity:     Days per week: Not on file     Minutes per session: Not on file    Stress: Not on file   Relationships    Social connections:     Talks on phone: Not on file     Gets together: Not on file     Attends Sikh service: Not on file     Active member of club or organization: Not on file     Attends meetings of clubs or organizations: Not on file     Relationship status: Not on file   Other Topics Concern    Not on file   Social History Narrative    The patient is .  Her daughter is her primary caregiver.       History reviewed. No pertinent family history.      ROS:  GENERAL: Patient denies fever, chills, night sweats.  Patient denies weight gain or loss. Patient denies anorexia, fatigue, weakness or swollen glands.  SKIN: Patient denies rash.  HEENT: Patient denies sore throat, ear pain, hearing loss, nasal congestion, or runny nose. Patient denies visual disturbance, eye irritation or discharge.  LUNGS: Patient denies cough, wheeze or hemoptysis.  CARDIOVASCULAR: Patient denies chest pain, shortness of breath, palpitations, syncope or lower extremity edema.  GI: Patient denies  nausea, vomiting, diarrhea, blood in stool or melena.  Positive for abdominal pain and constipation.  GENITOURINARY: Patient denies pelvic pain, vaginal discharge, itch or odor. Patient denies irregular vaginal bleeding.  Patient denies dysuria, frequency, hematuria, nocturia, urgency or incontinence.  MUSCULOSKELETAL: Patient denies joint swelling,  redness or warmth.  Positive for joint pain.  NEUROLOGIC: Patient denies headache, vertigo, paresthesias, weakness in limb, dysarthria, dysphagia or abnormality of gait.  PSYCHIATRIC: Patient denies depression, anxiety or memory loss.    OBJECTIVE:   GENERAL: Well-developed well-nourished obese black female alert and oriented x3 in no acute distress.  Memory, judgment and cognition without deficit.  Accompanied by her daughter.  SKIN: Clear without rash.  Normal color and tone.  HEENT: Eyes: Clear conjunctivae.  No scleral icterus.  Ears: Clear canals.  Clear TMs.  Nose: Without congestion.  Pharynx: Without injection or exudates.  NECK: Supple, normal range of motion.  No lymphadenopathy.  No masses or enlarged thyroid.  No JVD.  Carotids 2+ and equal.  No bruits.  LUNGS: Clear to auscultation.  Normal respiratory effort.  CARDIOVASCULAR: Regular rhythm, normal S1, S2 with grade 2/6 systolic murmur heard best at the upper sternal border.  No gallops or rubs.  BACK: No CVA or spinal tenderness.  ABDOMEN: Normal appearance.  Decreased bowel sounds.  Soft, with mild LUQ tenderness.  Without mass or organomegaly.  No rebound or guarding.  EXTREMITIES: Without cyanosis, clubbing or edema.  Distal pulses 2+ and equal.  Normal range of motion in all extremities.  No joint effusion, erythema or warmth.  NEUROLOGIC:  Motor strength equal bilaterally.  Sensation normal to touch.  Deep tendon reflexes 2+ and equal.  Gait unsteady without support.  Patient could not step up onto exam table  No tremor.      UA dip:  2+ leukocytes.  Positive nitrates.  Trace protein.  2+ ketones.  Trace blood.    ASSESSMENT:  1. Left upper quadrant pain    2. Chronic constipation    3. Abnormal urine    4. Uterine prolapse    5. Type 2 diabetes mellitus without complication, without long-term current use of insulin    6. Gait instability      PLAN:   1.  Colon cleansing with MiraLax or magnesium citrate.  2.  May use MiraLax daily for 3  days.  3.  Urine culture and sensitivity.  4.  Consider imaging study if no improvement or worsening condition.  5. Take Tylenol extra-strength 2 tablets 4 times daily as needed for pain.  6.  May continue aspirin 325 mg daily.  7.  Increase fiber in diet and/or fiber product daily.  8.  Increase daily water intake to 48-64 oz.  9.  Seek medical attention for worsening condition.    This note is generated with speech recognition software and is subject to transcription error and sound alike phrases that may be missed by proofreading.

## 2019-09-06 NOTE — TELEPHONE ENCOUNTER
Advised Savanah that patient's urine did come back positive for infection, needing antibiotic treatment. She verbally verified understanding and asked to have the antibiotic sent to [facility] in [place]./w    ----- Message from Thelma Doran MD sent at 9/6/2019  3:39 PM CDT -----   Urine culture positive for infection.  Needs antibiotic.

## 2019-09-06 NOTE — TELEPHONE ENCOUNTER
Left a message as requested advising Savanah that patient's prescription has been called in to pharmacy/[facility]w

## 2019-09-09 NOTE — TELEPHONE ENCOUNTER
Savanah stated that her mother (patient) is afraid to take the medication (Cipro) due to the side effects that it shows it can cause. She's had triple bypass surgery and 3 stints. She wants to know if there's an alternative./gin    ----- Message from Yanni Hanson sent at 9/9/2019 12:00 PM CDT -----  Contact: daughter/Savanah  Please call pt daughter @ [phone] regarding pt antibiotic, states the one doctor called in have several side affects, due to pt heart.

## 2019-09-13 NOTE — PROGRESS NOTES
Subjective:     Patient ID: Jennifer Zhu is a 78 y.o. female.    Chief Complaint: Nail Care (RNC. Diabetic Pt. Wears casual shoes. B/L plantar pain. Rates pain 5/10. PCP Dr Najera.)    Jennifer is a 78 y.o. female who presents to the clinic for evaluation and treatment of high risk feet. Jennifer has a past medical history of Arthritis, Diabetes mellitus, type 2, GERD (gastroesophageal reflux disease), Hyperlipidemia, Hypertension, and Uterine prolapse. The patient's chief complaint is long, thick toenails. This patient has documented high risk feet requiring routine maintenance secondary to diabetes mellitis and those secondary complications of diabetes, as mentioned..    PCP: Gómez Najera MD, MD    Date Last Seen by PCP: 6/24/19    Current shoe gear:  Affected Foot: Casual shoes     Unaffected Foot: Casual shoes      Patient Active Problem List   Diagnosis    Coronary artery disease involving native coronary artery without angina pectoris    Diabetes mellitus    Essential hypertension    S/P coronary artery stent placement    Uterine prolapse       Medication List with Changes/Refills   New Medications    NITROFURANTOIN, MACROCRYSTAL-MONOHYDRATE, (MACROBID) 100 MG CAPSULE    Take 1 capsule (100 mg total) by mouth 2 (two) times daily.   Current Medications    AMLODIPINE (NORVASC) 5 MG TABLET    Take 5 mg by mouth.    ASPIRIN 325 MG TABLET    Take 325 mg by mouth.    BLOOD SUGAR DIAGNOSTIC (ONETOUCH ULTRA TEST) STRP    1 boxes by Misc.(Non-Drug; Combo Route) route daily.    CLOPIDOGREL (PLAVIX) 75 MG TABLET    Take 75 mg by mouth.    DICLOFENAC SODIUM (VOLTAREN) 1 % GEL    Apply 2 g topically once daily.    ESTRACE 0.01 % (0.1 MG/GRAM) VAGINAL CREAM    USE AS DIRECTED VAGINALLY ONE TO TWO TIMES PER WEEK AS DIRECTED    FLUTICASONE (FLONASE) 50 MCG/ACTUATION NASAL SPRAY    SHAKE WELL USE 1 SQUIRT IN EACH NOSTRIL ONCE DAILY FOR NASAL ALLERGY SYMPTOMS    FUROSEMIDE (LASIX) 20 MG TABLET    Take 1-2 tablet by mouth   per day for fluid and blood pressure    LANCETS (ONETOUCH ULTRASOFT LANCETS) MISC    Check BS bid    LINACLOTIDE (LINZESS) 145 MCG CAP CAPSULE    Take 90 mcg by mouth.     LORATADINE (CLARITIN) 10 MG TABLET    Take 10 mg by mouth.    METFORMIN (GLUCOPHAGE) 500 MG TABLET    TAKE 1 TABLET BY MOUTH TWICE DAILY AS DIRECTED FOR SUGAR DIABETES TAKE WITH FOOD    METOPROLOL SUCCINATE (TOPROL-XL) 25 MG 24 HR TABLET    Take 1/2 tab po Bid.    NITROGLYCERIN (NITROSTAT) 0.4 MG SL TABLET    Place 0.4 mg under the tongue.    -PROPYLENE GLYCOL, PF, 0.4-0.3 % DPET    Apply 1 drop to eye.    POLYETHYLENE GLYCOL (GLYCOLAX) 17 GRAM PWPK    Take 17 g by mouth.    POTASSIUM CHLORIDE (KLOR-CON) 10 MEQ TBSR    TAKE ONE (1) TABLET BY MOUTH TWICE A DAY WITH A FULL GLASS OF WATER FOR POTASSIUM    PROPYLENE GLYCOL/ (SYSTANE ULTRA OPHT)    Apply 1 drop to eye every 2 (two) hours.    RANITIDINE (ZANTAC) 150 MG TABLET    Take 150 mg by mouth 2 (two) times daily.    SIMVASTATIN (ZOCOR) 20 MG TABLET    Take 20 mg by mouth.    TRAVOPROST (TRAVATAN Z) 0.004 % DROP        TRIAMCINOLONE ACETONIDE 0.1% (KENALOG) 0.1 % OINTMENT    Apply 1 applicator topically.       Review of patient's allergies indicates:   Allergen Reactions    Lisinopril Swelling     Lips swelling    Penicillins Anaphylaxis    Sulfa (sulfonamide antibiotics) Hives       Past Surgical History:   Procedure Laterality Date    BLADDER SUSPENSION      CORONARY ARTERY BYPASS GRAFT      x3    INTRAOCULAR LENS INSERTION      left eye       History reviewed. No pertinent family history.    Social History     Socioeconomic History    Marital status:      Spouse name: Not on file    Number of children: Not on file    Years of education: Not on file    Highest education level: Not on file   Occupational History    Not on file   Social Needs    Financial resource strain: Not on file    Food insecurity:     Worry: Not on file     Inability: Not on file     "Transportation needs:     Medical: Not on file     Non-medical: Not on file   Tobacco Use    Smoking status: Never Smoker    Smokeless tobacco: Never Used   Substance and Sexual Activity    Alcohol use: No    Drug use: No    Sexual activity: Not Currently   Lifestyle    Physical activity:     Days per week: Not on file     Minutes per session: Not on file    Stress: Not on file   Relationships    Social connections:     Talks on phone: Not on file     Gets together: Not on file     Attends Yazdanism service: Not on file     Active member of club or organization: Not on file     Attends meetings of clubs or organizations: Not on file     Relationship status: Not on file   Other Topics Concern    Not on file   Social History Narrative    The patient is .  Her daughter is her primary caregiver.       Vitals:    09/05/19 1525   BP: (!) 149/75   Pulse: 95   Weight: 97.5 kg (214 lb 15.2 oz)   Height: 5' 6" (1.676 m)   PainSc:   5   PainLoc: Foot       No results found for: HGBA1C    Review of Systems   Constitutional: Negative for chills and fever.   Respiratory: Negative for shortness of breath.    Cardiovascular: Positive for leg swelling. Negative for chest pain, palpitations, orthopnea and claudication.   Gastrointestinal: Negative for diarrhea, nausea and vomiting.   Musculoskeletal: Negative for joint pain.   Skin: Negative for rash.   Neurological: Negative for dizziness, tingling, sensory change, focal weakness and weakness.   Psychiatric/Behavioral: Negative.              Objective:   PHYSICAL EXAM: Apperance: Alert and orient in no distress,well developed, and with good attention to grooming and body habits  Patient presents ambulating in casual shoes.   LOWER EXTREMITY EXAM:  VASCULAR: Dorsalis pedis pulses 1/4 bilateral and Posterior Tibial pulses 0/4 left 1/4 right. Capillary fill time <4 seconds bilateral. Mild edema observed bilateral. Varicosities present bilateral. Skin temperature of the " lower extremities is warm to cool, proximal to distal. Hair growth dim bilateral.  DERMATOLOGICAL: No skin rashes, subcutaneous nodules, lesions, or ulcers observed bilateral. Nails 1,2,3,4,5 bilateral elongated, thickened, and discolored with subungual debris. Webspaces 1,2,3,4 bilateral clean, dry and without evidence of break in skin integrity.   NEUROLOGICAL: Light touch, sharp-dull, proprioception all present and equal bilaterally.  Vibratory sensation diminished. Protective sensation decreased sites as tested with a [place]-P[place]ual 5.07 monofilament.   MUSCULOSKELETAL: Muscle strength is 5/5 for foot inverters, everters, plantarflexors, and dorsiflexors. Muscle tone is normal. No pain on palpation of left dorsal foot or midfoot.    Assessment:   Dermatophytosis of nail    Arterial insufficiency    Type 2 diabetes mellitus without complication, without long-term current use of insulin          Plan:   Dermatophytosis of nail    Arterial insufficiency    Type 2 diabetes mellitus without complication, without long-term current use of insulin      I counseled the patient on her conditions, regarding findings of my examination, my impressions, and usual treatment plan.   Greater than 15 minutes of a 20 minute appointment spent on education about the diabetic foot, neuropathy, and prevention of limb loss.  Shoe inspection. Diabetic Foot Education. Patient reminded of the importance of good nutrition and blood sugar control to help prevent podiatric complications of diabetes. Patient instructed on proper foot hygeine. We discussed wearing proper shoe gear, daily foot inspections, never walking without protective shoe gear, never putting sharp instruments to feet.    With patient's permission, nails 1-5 bilateral were debrided/trimmed in length and thickness aggressively to their soft tissue attachment mechanically and with electric , removing all offending nail and debris. Patient relates relief following the  procedure.  Patient counseled on ways to improve swelling in lower extremities including decreasing/eliminating salt intake, elevating lower extremities, compression stocking, or oral medications. Patient states she understands.   Patient  will continue to monitor the areas daily, inspect feet, wear protective shoe gear when ambulatory, moisturizer to maintain skin integrity. Patient reminded of the importance of good nutrition and blood sugar control to help prevent podiatric complications of diabetes.  Patient to return 6 months or sooner if needed.                 Wes Lin DPM  [facility]

## 2019-10-17 NOTE — PROGRESS NOTES
Subjective:       Patient ID: Jennifer Zhu is a 78 y.o. female.    Chief Complaint: Constipation (possible constipation)    HPI    Chronic issues w/ constipation for years. Sees Dr. Lo at [facility]. Pt is on linzess.  Saw provider a month ago took 3 days of miralax and mag citrate. Had relief, but s/s returned. Today with nausea, abdominal pain (Left quadrant). Appetite ok, but decreased with intermittent nausea. Drinks sufficient water and eats veggies daily      Past Medical History:   Diagnosis Date    Arthritis     Diabetes mellitus, type 2     GERD (gastroesophageal reflux disease)     Hyperlipidemia     Hypertension     Uterine prolapse        Past Surgical History:   Procedure Laterality Date    BLADDER SUSPENSION      CORONARY ARTERY BYPASS GRAFT      x3    INTRAOCULAR LENS INSERTION      left eye     Social History     Socioeconomic History    Marital status:      Spouse name: Not on file    Number of children: Not on file    Years of education: Not on file    Highest education level: Not on file   Occupational History    Not on file   Social Needs    Financial resource strain: Not on file    Food insecurity:     Worry: Not on file     Inability: Not on file    Transportation needs:     Medical: Not on file     Non-medical: Not on file   Tobacco Use    Smoking status: Never Smoker    Smokeless tobacco: Never Used   Substance and Sexual Activity    Alcohol use: No    Drug use: No    Sexual activity: Not Currently   Lifestyle    Physical activity:     Days per week: Not on file     Minutes per session: Not on file    Stress: Not on file   Relationships    Social connections:     Talks on phone: Not on file     Gets together: Not on file     Attends Sikhism service: Not on file     Active member of club or organization: Not on file     Attends meetings of clubs or organizations: Not on file     Relationship status: Not on file   Other Topics Concern    Not on  file   Social History Narrative    The patient is .  Her daughter is her primary caregiver.     Review of patient's allergies indicates:   Allergen Reactions    Lisinopril Swelling     Lips swelling    Penicillins Anaphylaxis    Sulfa (sulfonamide antibiotics) Hives     Current Outpatient Medications   Medication Sig    amlodipine (NORVASC) 5 MG tablet Take 5 mg by mouth.    aspirin 325 MG tablet Take 325 mg by mouth.    blood sugar diagnostic (ONETOUCH ULTRA TEST) Strp 1 boxes by Misc.(Non-Drug; Combo Route) route daily.    clopidogrel (PLAVIX) 75 mg tablet Take 75 mg by mouth.    diclofenac sodium (VOLTAREN) 1 % Gel Apply 2 g topically once daily.    ESTRACE 0.01 % (0.1 mg/gram) vaginal cream USE AS DIRECTED VAGINALLY ONE TO TWO TIMES PER WEEK AS DIRECTED    fluticasone (FLONASE) 50 mcg/actuation nasal spray SHAKE WELL USE 1 SQUIRT IN EACH NOSTRIL ONCE DAILY FOR NASAL ALLERGY SYMPTOMS    furosemide (LASIX) 20 MG tablet Take 1-2 tablet by mouth  per day for fluid and blood pressure    lancets (ONETOUCH ULTRASOFT LANCETS) Misc Check BS bid    linaclotide (LINZESS) 145 mcg Cap capsule Take 90 mcg by mouth.     loratadine (CLARITIN) 10 mg tablet Take 10 mg by mouth.    metFORMIN (GLUCOPHAGE) 500 MG tablet TAKE 1 TABLET BY MOUTH TWICE DAILY AS DIRECTED FOR SUGAR DIABETES TAKE WITH FOOD    metoprolol succinate (TOPROL-XL) 25 MG 24 hr tablet Take 1/2 tab po Bid.    nitroGLYCERIN (NITROSTAT) 0.4 MG SL tablet Place 0.4 mg under the tongue.    peg 400-propylene glycol, PF, 0.4-0.3 % Dpet Apply 1 drop to eye.    potassium chloride (KLOR-CON) 10 MEQ TbSR TAKE ONE (1) TABLET BY MOUTH TWICE A DAY WITH A FULL GLASS OF WATER FOR POTASSIUM    propylene glycol/peg 400 (SYSTANE ULTRA OPHT) Apply 1 drop to eye every 2 (two) hours.    ranitidine (ZANTAC) 150 MG tablet Take 150 mg by mouth 2 (two) times daily.    simvastatin (ZOCOR) 20 MG tablet Take 20 mg by mouth.    travoprost (TRAVATAN Z) 0.004 % Drop      triamcinolone acetonide 0.1% (KENALOG) 0.1 % ointment Apply 1 applicator topically.    docusate sodium (COLACE) 100 MG capsule Take 1 capsule (100 mg total) by mouth 2 (two) times daily. for 10 days    ondansetron (ZOFRAN-ODT) 8 MG TbDL Take 1 tablet (8 mg total) by mouth every 12 (twelve) hours as needed.    polyethylene glycol (GLYCOLAX) 17 gram/dose powder Take 17 g by mouth once daily.     No current facility-administered medications for this visit.            Review of Systems   Constitutional: Negative for activity change, appetite change, chills, diaphoresis, fatigue, fever and unexpected weight change.   HENT: Negative for congestion, ear pain, postnasal drip, rhinorrhea, sinus pressure, sinus pain, sneezing, sore throat, tinnitus, trouble swallowing and voice change.    Eyes: Negative for photophobia, pain and visual disturbance.   Respiratory: Negative for cough, chest tightness, shortness of breath and wheezing.    Cardiovascular: Negative for chest pain, palpitations and leg swelling.   Gastrointestinal: Positive for abdominal pain, constipation and nausea. Negative for abdominal distention, diarrhea and vomiting.   Genitourinary: Negative for decreased urine volume, difficulty urinating, dysuria, flank pain, frequency, hematuria and urgency.   Musculoskeletal: Negative for arthralgias, back pain, joint swelling, neck pain and neck stiffness.   Allergic/Immunologic: Negative for immunocompromised state.   Neurological: Negative for dizziness, tremors, seizures, syncope, facial asymmetry, speech difficulty, weakness, light-headedness, numbness and headaches.   Hematological: Negative for adenopathy. Does not bruise/bleed easily.   Psychiatric/Behavioral: Negative for confusion and sleep disturbance.       Objective:      Physical Exam   Constitutional: She is oriented to person, place, and time.   HENT:   Head: Normocephalic and atraumatic.   Right Ear: Tympanic membrane normal.   Left Ear:  Tympanic membrane normal.   Eyes: Conjunctivae and EOM are normal.   Neck: Normal range of motion. Neck supple.   Cardiovascular: Normal rate, regular rhythm, normal heart sounds and intact distal pulses.   Pulmonary/Chest: Effort normal and breath sounds normal.   Abdominal: Soft. Bowel sounds are decreased. There is generalized tenderness.   Musculoskeletal: Normal range of motion.   Neurological: She is alert and oriented to person, place, and time.   Skin: Skin is warm and dry.       Assessment:     Vitals:    10/17/19 0843   BP: 128/74   Pulse: 61   Temp: 97.8 °F (36.6 °C)         1. Chronic constipation    2. Nausea        Plan:   Chronic constipation  -     X-Ray Abdomen Flat And Erect; Future; Expected date: 10/17/2019  -     Urinalysis; Future; Expected date: 10/17/2019    Nausea  -     X-Ray Abdomen Flat And Erect; Future; Expected date: 10/17/2019    Other orders  -     ondansetron (ZOFRAN-ODT) 8 MG TbDL; Take 1 tablet (8 mg total) by mouth every 12 (twelve) hours as needed.  Dispense: 10 tablet; Refill: 0  -     polyethylene glycol (GLYCOLAX) 17 gram/dose powder; Take 17 g by mouth once daily.  Dispense: 510 g; Refill: 3  -     docusate sodium (COLACE) 100 MG capsule; Take 1 capsule (100 mg total) by mouth 2 (two) times daily. for 10 days  Dispense: 60 capsule; Refill: 11        Add probiotic  Add daily miralax  Increase colace to twice daily  Xray today    Consider daily prune juice  Consider weekly dulcolax suppository  Consider daily fiber supplement

## 2019-10-17 NOTE — PATIENT INSTRUCTIONS
Add probiotic  Add daily miralax  Increase colace to twice daily  Xray today        Consider daily prune juice  Consider weekly dulcolax suppository  Consider daily fiber supplement

## 2019-10-23 NOTE — TELEPHONE ENCOUNTER
Contacted Pt.'s daughter regarding lab and xray results , Lm for Daughter Ms. Pavon on 10/23/19 to return call back to clinic regarding mother's results...//janes        --- Message from Rachael Urias sent at 10/23/2019 10:06 AM CDT -----  Contact: pt daughter   Type:  Test Results    Who Called:  pt daughter yessenia  Name of Test (Lab/Mammo/Etc): labs and X-ray   Date of Test:  10/17/2019  Ordering Provider: adilson   Where the test was performed:  [facility]   Would the patient rather a call back or a response via [facility]? Call   Best Call Back Number:  [phone]    Additional Information:

## 2019-11-07 NOTE — H&P (VIEW-ONLY)
Von  Subjective:      DATE OF VISIT: 11/7/19     ?  Patient ID:?Jennifer Zhu is a 79 y.o. female.?? MR#: [id number]   ?   REFERRING PROVIDER: Franci Case NP  [id number] [place] [address]     ? Primary Care Providers:  Gómez Najera MD, MD (General)     CHIEF COMPLAINT: incidental peritoneal masses on CT renal imaging????   ?   ONCOLOGIC DIAGNOSIS: peritoneal carcinomatosis, diagnostic workup ongoing, see below  ?   CURRENT TREATMENT: TBD  ?   ONCOLOGIC HISTORY:   ?   Today I have the pleasure of meeting Ms. Zhu, an 79-year-old woman who was referred due to abnormal finding on CT. She is accompanied today by her daughter.    Ms. Zhu has a medical history notable for CAD with CABG in 2011, type 2 diabetes, hypertension, dysphagia with esophageal dilation in 2011, chronic constipation [facility].      Since June 2019 she has had worsened constipation, and progressive abdominal pain with mild distension, early satiety and 25 lb weight loss in the last 2.5 months.  Abdominal pain is diffuse with some relief after bowel movements.  She denies any melena or hematochezia.  She has had nausea without vomiting.  She has a history of dysphagia with esophageal dilation in 2011 and this has been stable.  No done dysphagia or hemoptysis. She does have occasional mild headache not requiring medication. She is up-to-date on mammography with last in 2019 at [facility]. She had pap smears in distant past without abnormality. No recent gynecologic follow-up. She has not had vaginal bleeding or discharge.    Urine culture from 09/03/2019 showed infection and she was started on antibiotics.  Follow-up UA on 10/17/2019 showed microscopic hematuria.  CT renal stone was performed on 11/01/2019 which revealed peritoneal carcinomatosis with abnormality in bilateral adnexa concerning for ovarian malignancy.  Lesion in left adnexa congruent with sigmoid colon in uterus  with involvement not excluded.  Multiple pelvic lymph nodes present.  There is also a right renal mass.    Review of Systems    ?   A comprehensive 14-point review of systems was reviewed with patient and was negative other than as specified above.   ?   PAST MEDICAL HISTORY:   Past Medical History:   Diagnosis Date    Arthritis     Diabetes mellitus, type 2     GERD (gastroesophageal reflux disease)     Hyperlipidemia     Hypertension     Uterine prolapse     ?     PAST SURGICAL HISTORY:   Past Surgical History:   Procedure Laterality Date    BLADDER SUSPENSION      CORONARY ARTERY BYPASS GRAFT      x3    INTRAOCULAR LENS INSERTION      left eye      ?   ALLERGIES:   Allergies as of 11/07/2019 - Reviewed 11/07/2019   Allergen Reaction Noted    Lisinopril Swelling 10/26/2016    Penicillins Anaphylaxis 10/26/2016    Sulfa (sulfonamide antibiotics) Hives 10/26/2016      ?   MEDICATIONS:?   No outpatient medications have been marked as taking for the 11/7/19 encounter (Initial consult) with Joanne Ervin MD.      ?   SOCIAL HISTORY:?   Social History     Tobacco Use    Smoking status: Never Smoker    Smokeless tobacco: Never Used   Substance Use Topics    Alcohol use: No      ?      ?   FAMILY HISTORY:   family history is not on file.   ?   Breast cancer in sister and niece at ages 50 and 30 respectively.  No other history of malignancy.      Objective:      Physical Exam      ?   Vitals:    11/07/19 1121   BP: (!) 153/73   Pulse: 97   Temp: 97.8 °F (36.6 °C)      ?   ECOG:?1   General appearance: Generally well appearing, in no acute distress, slowed responses to questioning but expresses good understanding and answers questions appropriately.  Head, eyes, ears, nose, and throat: Pupils round and equally reactive to light. Oropharynx clear with moist mucous membranes.   Lymph: No palpable cervical or supraclavicular lymphadenopathy.   Cardiovascular: Regular rate and rhythm, S1, S2, no audible  murmurs.   Respiratory: Lungs clear to auscultation bilaterally.   Abdomen: Bowel sounds present, soft, nontender, minimally distended. No palpable hepatosplenomegaly.   Extremities: Warm, without edema although wearing compression stockings.   Neurologic: Alert and oriented. Grossly normal strength, coordination, and gait.   Skin: No rashes, ecchymoses or petechial lesion.     ?   Laboratory:  ?   Lab Visit on 11/07/2019   Component Date Value Ref Range Status    WBC 11/07/2019 6.38  3.90 - 12.70 K/uL Final    RBC 11/07/2019 4.08  4.00 - 5.40 M/uL Final    Hemoglobin 11/07/2019 12.8  12.0 - 16.0 g/dL Final    Hematocrit 11/07/2019 40.7  37.0 - 48.5 % Final    Mean Corpuscular Volume 11/07/2019 100* 82 - 98 fL Final    Mean Corpuscular Hemoglobin 11/07/2019 31.4* 27.0 - 31.0 pg Final    Mean Corpuscular Hemoglobin Conc 11/07/2019 31.4* 32.0 - 36.0 g/dL Final    RDW 11/07/2019 16.6* 11.5 - 14.5 % Final    Platelets 11/07/2019 296  150 - 350 K/uL Final    MPV 11/07/2019 9.7  9.2 - 12.9 fL Final    Immature Granulocytes 11/07/2019 0.2  0.0 - 0.5 % Final    Gran # (ANC) 11/07/2019 3.8  1.8 - 7.7 K/uL Final    Immature Grans (Abs) 11/07/2019 0.01  0.00 - 0.04 K/uL Final    Lymph # 11/07/2019 1.8  1.0 - 4.8 K/uL Final    Mono # 11/07/2019 0.7  0.3 - 1.0 K/uL Final    Eos # 11/07/2019 0.1  0.0 - 0.5 K/uL Final    Baso # 11/07/2019 0.03  0.00 - 0.20 K/uL Final    nRBC 11/07/2019 0  0 /100 WBC Final    Gran% 11/07/2019 59.3  38.0 - 73.0 % Final    Lymph% 11/07/2019 27.9  18.0 - 48.0 % Final    Mono% 11/07/2019 11.0  4.0 - 15.0 % Final    Eosinophil% 11/07/2019 1.3  0.0 - 8.0 % Final    Basophil% 11/07/2019 0.5  0.0 - 1.9 % Final    Differential Method 11/07/2019 Automated   Final    Sodium 11/07/2019 138  136 - 145 mmol/L Final    Potassium 11/07/2019 4.5  3.5 - 5.1 mmol/L Final    Chloride 11/07/2019 105  95 - 110 mmol/L Final    CO2 11/07/2019 21* 23 - 29 mmol/L Final    Glucose 11/07/2019 111*  70 - 110 mg/dL Final    BUN, Bld 11/07/2019 7* 8 - 23 mg/dL Final    Creatinine 11/07/2019 0.7  0.5 - 1.4 mg/dL Final    Calcium 11/07/2019 9.8  8.7 - 10.5 mg/dL Final    Total Protein 11/07/2019 8.2  6.0 - 8.4 g/dL Final    Albumin 11/07/2019 3.5  3.5 - 5.2 g/dL Final    Total Bilirubin 11/07/2019 0.6  0.1 - 1.0 mg/dL Final    Alkaline Phosphatase 11/07/2019 49* 55 - 135 U/L Final    AST 11/07/2019 26  10 - 40 U/L Final    ALT 11/07/2019 9* 10 - 44 U/L Final    Anion Gap 11/07/2019 12  8 - 16 mmol/L Final    eGFR if African American 11/07/2019 >60  >60 mL/min/1.73 m^2 Final    eGFR if non African American 11/07/2019 >60  >60 mL/min/1.73 m^2 Final      ?   Tumor markers   ?  and CEA pending  ?   Imaging:  ?  CT RENAL STONE STUDY ABD PELVIS WO    CLINICAL HISTORY:  Flank pain, stone disease suspected;Hematuria; Unspecified abdominal pain    TECHNIQUE:  Low dose axial images, sagittal and coronal reformations were obtained from the lung bases to the pubic symphysis.  Contrast was not administered.    COMPARISON:  Recent radiograph    FINDINGS:  Lung bases demonstrate chronic interstitial changes with superimposed atelectasis.  Tiny right and trace left pleural effusions noted.    Non contrasted liver demonstrates no focal abnormality.  Multiple splenic granulomas present.  Pancreas is unremarkable.  Gallstones noted without CT evidence of cholecystitis.  No biliary dilatation.  Bilateral adrenal adenomas present.    Kidneys demonstrate no symmetric perinephric scarring.  Punctate nonobstructive nephrolithiasis noted bilaterally.  1.3 cm exophytic lower pole right renal lesion present not measuring simple fluid Hounsfield units.    Stomach demonstrates tiny hiatal hernia.  Small bowel is nonobstructive.    Multiple peritoneal implants noted within the mid and lower abdomen, largest measuring 10 x 2 cm.  There is abnormal fat haziness within the pelvis with multiple upper limits of normal to mildly  enlarged pelvic lymph nodes.  There is ill-defined soft tissue density measuring 3.5 x 4.4 cm in the left adnexal region which may represent abnormal left ovary.  This area however is congruent with adjacent sigmoid colon and uterus.  4 x 3 cm partial cystic area may represent right ovarian lesion versus exophytic right uterine fibroid.    Bladder is decompressed.  Multiple pelvic phleboliths noted.  Aortoiliac atherosclerotic changes noted.    No acute osseous abnormality.      Impression       1. Findings consistent with peritoneal carcinomatosis with abnormal appearance the bilateral adnexal regions concerning for underlying ovarian tumor(s).  Lesion within the left adnexa appears congruent with the sigmoid colon and uterus with involvement not excluded.  Multiple pelvic lymph nodes present.  2. Right renal mass.  3. Cholelithiasis.  4. Pleural effusions and chronic pulmonary findings with superimposed atelectasis.  5. CT renal mass protocol recommended with oral contrast to better delineate pelvic findings.  This report was flagged in Epic as abnormal.           Pathology:    none    ?   Assessment/Plan:       1. Peritoneal carcinomatosis    2. Malignant neoplasm of ovary, unspecified laterality     3. Other intra-abdominal and pelvic swelling, mass and lump     4. Ovarian neoplasm          Plan:     # peritoneal carcinomatosis:  Progressive abdominal pain and constipation since June 2019.  Workup for microscopic hematuria with CT renal stone protocol noncontrast incidentally revealed peritoneal carcinomatosis with pelvic lymphadenopathy, see above.  I reviewed this imaging along with Mr. Zhu and daughter today and discussed appearance is highly concerning for peritoneal carcinomatosis from gynecologic malignancy.  I have recommended staging imaging with CT chest abdomen pelvis with contrast and IR guided biopsy of most accessible peritoneal metastasis.  I will follow up with her a week after biopsy to  review results, diagnosis and treatment options.  Labs obtained after clinic today without cytopenia (mild macrocytosis without anemia), renal or liver abnormalities. Tumor marker CEA and CA 19 9 pending.  She notes being up-to-date on mammography without abnormal findings.  Colonoscopy about 4 years ago with Dr[facility] reportedly normal aside from polyps with 5 year follow-up recommended.      Follow-Up:   One week after CT guided biopsy of peritoneal lesion.

## 2019-11-07 NOTE — PROGRESS NOTES
Von  Subjective:      DATE OF VISIT: 11/7/19     ?  Patient ID:?Jennifer Zhu is a 79 y.o. female.?? MR#: [id number]   ?   REFERRING PROVIDER: Franci Case NP  [id number] [place] [address]     ? Primary Care Providers:  Gómez Najera MD, MD (General)     CHIEF COMPLAINT: incidental peritoneal masses on CT renal imaging????   ?   ONCOLOGIC DIAGNOSIS: peritoneal carcinomatosis, diagnostic workup ongoing, see below  ?   CURRENT TREATMENT: TBD  ?   ONCOLOGIC HISTORY:   ?   Today I have the pleasure of meeting Ms. Zhu, an 79-year-old woman who was referred due to abnormal finding on CT. She is accompanied today by her daughter.    Ms. Zhu has a medical history notable for CAD with CABG in 2011, type 2 diabetes, hypertension, dysphagia with esophageal dilation in 2011, chronic constipation followed [facility].      Since June 2019 she has had worsened constipation, and progressive abdominal pain with mild distension, early satiety and 25 lb weight loss in the last 2.5 months.  Abdominal pain is diffuse with some relief after bowel movements.  She denies any melena or hematochezia.  She has had nausea without vomiting.  She has a history of dysphagia with esophageal dilation in 2011 and this has been stable.  No done dysphagia or hemoptysis. She does have occasional mild headache not requiring medication. She is up-to-date on mammography with last in 2019 at [facility]. She had pap smears in distant past without abnormality. No recent gynecologic follow-up. She has not had vaginal bleeding or discharge.    Urine culture from 09/03/2019 showed infection and she was started on antibiotics.  Follow-up UA on 10/17/2019 showed microscopic hematuria.  CT renal stone was performed on 11/01/2019 which revealed peritoneal carcinomatosis with abnormality in bilateral adnexa concerning for ovarian malignancy.  Lesion in left adnexa congruent with sigmoid colon in uterus  with involvement not excluded.  Multiple pelvic lymph nodes present.  There is also a right renal mass.    Review of Systems    ?   A comprehensive 14-point review of systems was reviewed with patient and was negative other than as specified above.   ?   PAST MEDICAL HISTORY:   Past Medical History:   Diagnosis Date    Arthritis     Diabetes mellitus, type 2     GERD (gastroesophageal reflux disease)     Hyperlipidemia     Hypertension     Uterine prolapse     ?     PAST SURGICAL HISTORY:   Past Surgical History:   Procedure Laterality Date    BLADDER SUSPENSION      CORONARY ARTERY BYPASS GRAFT      x3    INTRAOCULAR LENS INSERTION      left eye      ?   ALLERGIES:   Allergies as of 11/07/2019 - Reviewed 11/07/2019   Allergen Reaction Noted    Lisinopril Swelling 10/26/2016    Penicillins Anaphylaxis 10/26/2016    Sulfa (sulfonamide antibiotics) Hives 10/26/2016      ?   MEDICATIONS:?   No outpatient medications have been marked as taking for the 11/7/19 encounter (Initial consult) with Joanne Ervin MD.      ?   SOCIAL HISTORY:?   Social History     Tobacco Use    Smoking status: Never Smoker    Smokeless tobacco: Never Used   Substance Use Topics    Alcohol use: No      ?      ?   FAMILY HISTORY:   family history is not on file.   ?   Breast cancer in sister and niece at ages 50 and 30 respectively.  No other history of malignancy.      Objective:      Physical Exam      ?   Vitals:    11/07/19 1121   BP: (!) 153/73   Pulse: 97   Temp: 97.8 °F (36.6 °C)      ?   ECOG:?1   General appearance: Generally well appearing, in no acute distress, slowed responses to questioning but expresses good understanding and answers questions appropriately.  Head, eyes, ears, nose, and throat: Pupils round and equally reactive to light. Oropharynx clear with moist mucous membranes.   Lymph: No palpable cervical or supraclavicular lymphadenopathy.   Cardiovascular: Regular rate and rhythm, S1, S2, no audible  murmurs.   Respiratory: Lungs clear to auscultation bilaterally.   Abdomen: Bowel sounds present, soft, nontender, minimally distended. No palpable hepatosplenomegaly.   Extremities: Warm, without edema although wearing compression stockings.   Neurologic: Alert and oriented. Grossly normal strength, coordination, and gait.   Skin: No rashes, ecchymoses or petechial lesion.     ?   Laboratory:  ?   Lab Visit on 11/07/2019   Component Date Value Ref Range Status    WBC 11/07/2019 6.38  3.90 - 12.70 K/uL Final    RBC 11/07/2019 4.08  4.00 - 5.40 M/uL Final    Hemoglobin 11/07/2019 12.8  12.0 - 16.0 g/dL Final    Hematocrit 11/07/2019 40.7  37.0 - 48.5 % Final    Mean Corpuscular Volume 11/07/2019 100* 82 - 98 fL Final    Mean Corpuscular Hemoglobin 11/07/2019 31.4* 27.0 - 31.0 pg Final    Mean Corpuscular Hemoglobin Conc 11/07/2019 31.4* 32.0 - 36.0 g/dL Final    RDW 11/07/2019 16.6* 11.5 - 14.5 % Final    Platelets 11/07/2019 296  150 - 350 K/uL Final    MPV 11/07/2019 9.7  9.2 - 12.9 fL Final    Immature Granulocytes 11/07/2019 0.2  0.0 - 0.5 % Final    Gran # (ANC) 11/07/2019 3.8  1.8 - 7.7 K/uL Final    Immature Grans (Abs) 11/07/2019 0.01  0.00 - 0.04 K/uL Final    Lymph # 11/07/2019 1.8  1.0 - 4.8 K/uL Final    Mono # 11/07/2019 0.7  0.3 - 1.0 K/uL Final    Eos # 11/07/2019 0.1  0.0 - 0.5 K/uL Final    Baso # 11/07/2019 0.03  0.00 - 0.20 K/uL Final    nRBC 11/07/2019 0  0 /100 WBC Final    Gran% 11/07/2019 59.3  38.0 - 73.0 % Final    Lymph% 11/07/2019 27.9  18.0 - 48.0 % Final    Mono% 11/07/2019 11.0  4.0 - 15.0 % Final    Eosinophil% 11/07/2019 1.3  0.0 - 8.0 % Final    Basophil% 11/07/2019 0.5  0.0 - 1.9 % Final    Differential Method 11/07/2019 Automated   Final    Sodium 11/07/2019 138  136 - 145 mmol/L Final    Potassium 11/07/2019 4.5  3.5 - 5.1 mmol/L Final    Chloride 11/07/2019 105  95 - 110 mmol/L Final    CO2 11/07/2019 21* 23 - 29 mmol/L Final    Glucose 11/07/2019 111*  70 - 110 mg/dL Final    BUN, Bld 11/07/2019 7* 8 - 23 mg/dL Final    Creatinine 11/07/2019 0.7  0.5 - 1.4 mg/dL Final    Calcium 11/07/2019 9.8  8.7 - 10.5 mg/dL Final    Total Protein 11/07/2019 8.2  6.0 - 8.4 g/dL Final    Albumin 11/07/2019 3.5  3.5 - 5.2 g/dL Final    Total Bilirubin 11/07/2019 0.6  0.1 - 1.0 mg/dL Final    Alkaline Phosphatase 11/07/2019 49* 55 - 135 U/L Final    AST 11/07/2019 26  10 - 40 U/L Final    ALT 11/07/2019 9* 10 - 44 U/L Final    Anion Gap 11/07/2019 12  8 - 16 mmol/L Final    eGFR if African American 11/07/2019 >60  >60 mL/min/1.73 m^2 Final    eGFR if non African American 11/07/2019 >60  >60 mL/min/1.73 m^2 Final      ?   Tumor markers   ?  and CEA pending  ?   Imaging:  ?  CT RENAL STONE STUDY ABD PELVIS WO    CLINICAL HISTORY:  Flank pain, stone disease suspected;Hematuria; Unspecified abdominal pain    TECHNIQUE:  Low dose axial images, sagittal and coronal reformations were obtained from the lung bases to the pubic symphysis.  Contrast was not administered.    COMPARISON:  Recent radiograph    FINDINGS:  Lung bases demonstrate chronic interstitial changes with superimposed atelectasis.  Tiny right and trace left pleural effusions noted.    Non contrasted liver demonstrates no focal abnormality.  Multiple splenic granulomas present.  Pancreas is unremarkable.  Gallstones noted without CT evidence of cholecystitis.  No biliary dilatation.  Bilateral adrenal adenomas present.    Kidneys demonstrate no symmetric perinephric scarring.  Punctate nonobstructive nephrolithiasis noted bilaterally.  1.3 cm exophytic lower pole right renal lesion present not measuring simple fluid Hounsfield units.    Stomach demonstrates tiny hiatal hernia.  Small bowel is nonobstructive.    Multiple peritoneal implants noted within the mid and lower abdomen, largest measuring 10 x 2 cm.  There is abnormal fat haziness within the pelvis with multiple upper limits of normal to mildly  enlarged pelvic lymph nodes.  There is ill-defined soft tissue density measuring 3.5 x 4.4 cm in the left adnexal region which may represent abnormal left ovary.  This area however is congruent with adjacent sigmoid colon and uterus.  4 x 3 cm partial cystic area may represent right ovarian lesion versus exophytic right uterine fibroid.    Bladder is decompressed.  Multiple pelvic phleboliths noted.  Aortoiliac atherosclerotic changes noted.    No acute osseous abnormality.      Impression       1. Findings consistent with peritoneal carcinomatosis with abnormal appearance the bilateral adnexal regions concerning for underlying ovarian tumor(s).  Lesion within the left adnexa appears congruent with the sigmoid colon and uterus with involvement not excluded.  Multiple pelvic lymph nodes present.  2. Right renal mass.  3. Cholelithiasis.  4. Pleural effusions and chronic pulmonary findings with superimposed atelectasis.  5. CT renal mass protocol recommended with oral contrast to better delineate pelvic findings.  This report was flagged in Epic as abnormal.           Pathology:    none    ?   Assessment/Plan:       1. Peritoneal carcinomatosis    2. Malignant neoplasm of ovary, unspecified laterality     3. Other intra-abdominal and pelvic swelling, mass and lump     4. Ovarian neoplasm          Plan:     # peritoneal carcinomatosis:  Progressive abdominal pain and constipation since June 2019.  Workup for microscopic hematuria with CT renal stone protocol noncontrast incidentally revealed peritoneal carcinomatosis with pelvic lymphadenopathy, see above.  I reviewed this imaging along with Mr. Zhu and daughter today and discussed appearance is highly concerning for peritoneal carcinomatosis from gynecologic malignancy.  I have recommended staging imaging with CT chest abdomen pelvis with contrast and IR guided biopsy of most accessible peritoneal metastasis.  I will follow up with her a week after biopsy to  review results, diagnosis and treatment options.  Labs obtained after clinic today without cytopenia (mild macrocytosis without anemia), renal or liver abnormalities. Tumor marker CEA and CA 19 9 pending.  She notes being up-to-date on mammography without abnormal findings.  Colonoscopy about 4 years ago with [facility] reportedly normal aside from polyps with 5 year follow-up recommended.      Follow-Up:   One week after CT guided biopsy of peritoneal lesion.

## 2019-11-07 NOTE — PROGRESS NOTES
Subjective:       Patient ID: Jennifer Zhu is a 79 y.o. female.    Chief Complaint: Follow-up (CT results)    HPI    Pt presents for follow up on CT results    Pt was seen for UTI/consipation issues  Abd xray showed nephrolithiasis (possible) - A CT was suggested  CT renal stone completed- potential cancerous findings shown  Attempted to call pt several times to discuss findings and to schedule oncology appt  Pt returns today for clarification on need for follow up   Her daughter is present with her today      Past Medical History:   Diagnosis Date    Arthritis     Diabetes mellitus, type 2     GERD (gastroesophageal reflux disease)     Hyperlipidemia     Hypertension     Uterine prolapse      Past Surgical History:   Procedure Laterality Date    BLADDER SUSPENSION      CORONARY ARTERY BYPASS GRAFT      x3    INTRAOCULAR LENS INSERTION      left eye     Past Surgical History:   Procedure Laterality Date    BLADDER SUSPENSION      CORONARY ARTERY BYPASS GRAFT      x3    INTRAOCULAR LENS INSERTION      left eye     Social History     Socioeconomic History    Marital status:      Spouse name: Not on file    Number of children: Not on file    Years of education: Not on file    Highest education level: Not on file   Occupational History    Not on file   Social Needs    Financial resource strain: Not on file    Food insecurity:     Worry: Not on file     Inability: Not on file    Transportation needs:     Medical: Not on file     Non-medical: Not on file   Tobacco Use    Smoking status: Never Smoker    Smokeless tobacco: Never Used   Substance and Sexual Activity    Alcohol use: No    Drug use: No    Sexual activity: Not Currently   Lifestyle    Physical activity:     Days per week: Not on file     Minutes per session: Not on file    Stress: Not on file   Relationships    Social connections:     Talks on phone: Not on file     Gets together: Not on file     Attends Christian service:  Not on file     Active member of club or organization: Not on file     Attends meetings of clubs or organizations: Not on file     Relationship status: Not on file   Other Topics Concern    Not on file   Social History Narrative    The patient is .  Her daughter is her primary caregiver.     Review of patient's allergies indicates:   Allergen Reactions    Lisinopril Swelling     Lips swelling    Penicillins Anaphylaxis    Sulfa (sulfonamide antibiotics) Hives     Current Outpatient Medications   Medication Sig    amlodipine (NORVASC) 5 MG tablet Take 5 mg by mouth.    aspirin 325 MG tablet Take 325 mg by mouth.    blood sugar diagnostic (ONETOUCH ULTRA TEST) Strp 1 boxes by Misc.(Non-Drug; Combo Route) route daily.    clopidogrel (PLAVIX) 75 mg tablet Take 75 mg by mouth.    diclofenac sodium (VOLTAREN) 1 % Gel Apply 2 g topically once daily.    ESTRACE 0.01 % (0.1 mg/gram) vaginal cream USE AS DIRECTED VAGINALLY ONE TO TWO TIMES PER WEEK AS DIRECTED    fluticasone (FLONASE) 50 mcg/actuation nasal spray SHAKE WELL USE 1 SQUIRT IN EACH NOSTRIL ONCE DAILY FOR NASAL ALLERGY SYMPTOMS    furosemide (LASIX) 20 MG tablet Take 1-2 tablet by mouth  per day for fluid and blood pressure    lancets (ONETOUCH ULTRASOFT LANCETS) Misc Check BS bid    linaclotide (LINZESS) 145 mcg Cap capsule Take 90 mcg by mouth.     loratadine (CLARITIN) 10 mg tablet Take 10 mg by mouth.    metFORMIN (GLUCOPHAGE) 500 MG tablet TAKE 1 TABLET BY MOUTH TWICE DAILY AS DIRECTED FOR SUGAR DIABETES TAKE WITH FOOD    metoprolol succinate (TOPROL-XL) 25 MG 24 hr tablet Take 1/2 tab po Bid.    nitroGLYCERIN (NITROSTAT) 0.4 MG SL tablet Place 0.4 mg under the tongue.    ondansetron (ZOFRAN-ODT) 8 MG TbDL Take 1 tablet (8 mg total) by mouth every 12 (twelve) hours as needed.    peg 400-propylene glycol, PF, 0.4-0.3 % Dpet Apply 1 drop to eye.    polyethylene glycol (GLYCOLAX) 17 gram/dose powder Take 17 g by mouth once daily.     potassium chloride (KLOR-CON) 10 MEQ TbSR TAKE ONE (1) TABLET BY MOUTH TWICE A DAY WITH A FULL GLASS OF WATER FOR POTASSIUM    propylene glycol/peg 400 (SYSTANE ULTRA OPHT) Apply 1 drop to eye every 2 (two) hours.    ranitidine (ZANTAC) 150 MG tablet Take 150 mg by mouth 2 (two) times daily.    simvastatin (ZOCOR) 20 MG tablet Take 20 mg by mouth.    travoprost (TRAVATAN Z) 0.004 % Drop     triamcinolone acetonide 0.1% (KENALOG) 0.1 % ointment Apply 1 applicator topically.     No current facility-administered medications for this visit.            Review of Systems   Constitutional: Negative for activity change, appetite change, chills, diaphoresis, fatigue, fever and unexpected weight change.   HENT: Negative for congestion, ear pain, postnasal drip, rhinorrhea, sinus pressure, sinus pain, sneezing, sore throat, tinnitus, trouble swallowing and voice change.    Eyes: Negative for photophobia, pain and visual disturbance.   Respiratory: Negative for cough, chest tightness, shortness of breath and wheezing.    Cardiovascular: Negative for chest pain, palpitations and leg swelling.   Gastrointestinal: Positive for abdominal pain and nausea. Negative for abdominal distention, constipation, diarrhea and vomiting.   Genitourinary: Negative for decreased urine volume, difficulty urinating, dysuria, flank pain, frequency, hematuria and urgency.   Musculoskeletal: Negative for arthralgias, back pain, joint swelling, neck pain and neck stiffness.   Allergic/Immunologic: Negative for immunocompromised state.   Neurological: Negative for dizziness, tremors, seizures, syncope, facial asymmetry, speech difficulty, weakness, light-headedness, numbness and headaches.   Hematological: Negative for adenopathy. Does not bruise/bleed easily.   Psychiatric/Behavioral: Negative for confusion and sleep disturbance.       Objective:      Physical Exam  deferred   Assessment:     Vitals:    11/07/19 1026   BP: 124/78   Pulse: 93    Temp: 96.3 °F (35.7 °C)         1. Abnormal abdominal CT scan        Plan:   Abnormal abdominal CT scan        Continue linzess, miralax, colace, fiber, water intake as discussed  Oncology visit today

## 2019-11-07 NOTE — LETTER
November 7, 2019      Franci Case, BRAD  [address]  [place] [address]           [facility]  [address]  [place] [address]  Phone: [phone]  Fax: [phone]          Patient: Jennifer Zhu   MR Number: [id number]   YOB: 1940   Date of Visit: 11/7/2019       Dear Franci Case:    Thank you for referring Jennifer Zhu to me for evaluation. Attached you will find relevant portions of my assessment and plan of care.    If you have questions, please do not hesitate to call me. I look forward to following Jennifer Zhu along with you.    Sincerely,    Joanne Ervin MD    Enclosure  CC:  No Recipients    If you would like to receive this communication electronically, please contact [email or web address] or [phone] to request more information on [employer] access.    For providers and/or their staff who would like to refer a patient to [facility], please contact us through our one-stop-shop provider referral line, [facility], at [phone].    If you feel you have received this communication in error or would no longer like to receive these types of communications, please e-mail [email or web address]

## 2019-11-08 NOTE — TELEPHONE ENCOUNTER
----- Message from Adore Contreras sent at 11/8/2019  8:12 AM CST -----  Contact: Savanah-Daughter  Savanah is requesting call back in regards to questions about biopsy.        [facility] call back at [phone]

## 2019-11-11 NOTE — TELEPHONE ENCOUNTER
----- Message from Yana Dobson sent at 11/11/2019  3:36 PM CST -----  Contact: Savanah- daughter  Type:  Patient Returning Call    Who Called: Savanah- daughter  Who Left Message for Patient: Kristina  Does the patient know what this is regarding?: test results  Would the patient rather a call back or a response via [facility]? call  Best Call Back Number: [phone] or [phone]  Additional Information: n/a

## 2019-11-12 NOTE — TELEPHONE ENCOUNTER
----- Message from Stacy Saunders sent at 11/12/2019  7:36 AM CST -----  Type:  Patient Returning Call    Who Called: Pt Daughter  (Savanah Zhu )  Who Left Message for Patient   [place] with Dr Ervin                                                                Does the patient know what this is regarding?: Lab results  Would the patient rather a call back or a response via [facility]?  Call back  Best Call Back Number:  [phone]  Additional Information:   States she is returning your call from yesterday//please call again//laurel/wolfgang

## 2019-11-12 NOTE — TELEPHONE ENCOUNTER
Several messages left for patient to return call. Sent patient a message through [facility] in attempt to communicate lab results and CT appointments//bdm

## 2019-11-18 NOTE — DISCHARGE SUMMARY
Procedure was performed Radiologist.  Sterile technique was performed in the abdomen, lidocaine was used as a local anesthetic.  Multiple samples taken from omental mass.  Pt tolerated the procedure well without immediate complications.  Please see radiologist report for details. F/u with PCP and/or ordering physician.

## 2019-11-18 NOTE — SEDATION DOCUMENTATION
Pt on ct table supine with bilateral arms above head.  Pt and daughter verbalized understanding of procedure.  Cm in place, vss.

## 2019-11-18 NOTE — SEDATION DOCUMENTATION
Procedure complete, pt tolerated well.  Vss.  Pt awake and alert, denied pain or discomfort at this time.  Band aid placed to r abdomen puncture site, site WDL.  Pt transferred to stretcher and transported to radiology recovery supine with hob elevated and arms at pt side.

## 2019-11-18 NOTE — DISCHARGE INSTRUCTIONS
Recovery After Procedural Sedation (Adult)  You have been given medicine by vein to make you sleep during your surgery. This may have included both a pain medicine and sleeping medicine. Most of the effects have worn off. But you may still have some drowsiness for the next 6 to 8 hours.  Home care  Follow these guidelines when you get home:  · For the next 8 hours, you should be watched by a responsible adult. This person should make sure your condition is not getting worse.  · Don't drink any alcohol for the next 24 hours.  · Don't drive, operate dangerous machinery, or make important business or personal decisions during the next 24 hours.  Note: Your healthcare provider may tell you not to take any medicine by mouth for pain or sleep in the next 4 hours. These medicines may react with the medicines you were given in the hospital. This could cause a much stronger response than usual.  Follow-up care  Follow up with your healthcare provider if you are not alert and back to your usual level of activity within 12 hours.  When to seek medical advice  Call your healthcare provider right away if any of these occur:  · Drowsiness gets worse  · Weakness or dizziness gets worse  · Repeated vomiting  · You can't be awakened   Date Last Reviewed: 10/18/2016  © 9609-2872 [employer] [address] [place] [address], [place] [address]. All rights reserved. This information is not intended as a substitute for professional medical care. Always follow your healthcare professional's instructions.  Image-Guided Biopsy     A needle is used to take a sample of tissue from inside the body.     A biopsy is a small sample of tissue or fluid taken from your body. This sample is then studied in a lab. Image-guided biopsy lets your health care provider take a sample from an abnormal mass without using surgery. This procedure is done by a general radiologist. It can also be done by a specially trained doctor called an interventional  radiologist.  Before your procedure  Tell your health care provider about any medicines, herbs, or supplements you are taking. This includes any over-the-counter medicines such as aspirin or ibuprofen.  Also tell your provider if you:  · Are pregnant or think you may be pregnant  · Are allergic to any medicines  Follow any directions you are given for not eating or drinking before the procedure. Follow any other instructions from your provider.  During your procedure  · You will change into a hospital gown and lie on a special table. The table that is used will depend on the type of imaging that will guide the biopsy. You may lie on your back, front, or side. Your position depends on where the biopsy is to be done.  · An IV (intravenous) line may be started. This is to give you fluids and medicines. You may be given medicine through the IV to help you relax.  · The skin over the biopsy site is cleaned. Medicine is put on the site to numb the skin.  · The radiologist will use CT (computed tomography), MRI, X-ray, or ultrasound images as a guide. He or she will put a thin, hollow needle through the skin. He or she will guide it to the area where the biopsy is to be done.  · The needle is used to take a sample of tissue or fluid from the area. The needle is then taken out. The sample is sent to a lab. It will be checked for cells that aren't normal.  After your procedure  · You will most likely be able to go home in a few hours.  · Be sure to have a friend or family member drive you home if you have had sedation.   · Care for the insertion site as directed.  Possible risks and complications  Possible risks and complications of an image-guided biopsy include:  · Bruising or bleeding at the place where the needle was put in  · Bleeding inside your body  · Damage to body areas along the path of the needle   Date Last Reviewed: 6/11/2015  © 2019-8248 [employer]. [address] [place] [address], [place] [address]. All  rights reserved. This information is not intended as a substitute for professional medical care. Always follow your healthcare professional's instructions.

## 2019-11-26 PROBLEM — C56.2: Status: ACTIVE | Noted: 2019-01-01

## 2019-11-26 NOTE — PROGRESS NOTES
Von  Subjective:      DATE OF VISIT: 11/26/19     ?  Patient ID:?Jennifer Zhu is a 79 y.o. female.?? MR#: [id number]   ?   REFERRING PROVIDER: No referring provider defined for this encounter.     ? Primary Care Providers:  Gómez Najera MD, MD (General)     CHIEF COMPLAINT:  Follow-up after imaging and biopsy  ?   ONCOLOGIC DIAGNOSIS: peritoneal carcinomatosis, diagnostic workup ongoing, see below  ?   CURRENT TREATMENT: TBD  ?   ONCOLOGIC HISTORY:   ?   Ms. Zhu has a medical comorbidites including CAD with CABG in 2011, type 2 diabetes, hypertension, dysphagia with esophageal dilation in 2011, chronic const[facility]      Since June 2019 she has had worsened constipation, and progressive abdominal pain with mild distension, early satiety and 25 lb weight loss in the last 2.5 months.  Abdominal pain is diffuse with some relief after bowel movements.  She denies any melena or hematochezia.  She has had nausea without vomiting.  She has a history of dysphagia with esophageal dilation in 2011 and this has been stable.  No done dysphagia or hemoptysis. She does have occasional mild headache not requiring medication. She is up-to-date on mammography with last in 2019 at [facility]. She had pap smears in distant past without abnormality. No recent gynecologic follow-up. She has not had vaginal bleeding or discharge.    Urine culture from 09/03/2019 showed infection and she was started on antibiotics.  Follow-up UA on 10/17/2019 showed microscopic hematuria.  CT renal stone was performed on 11/01/2019 which revealed peritoneal carcinomatosis with abnormality in bilateral adnexa concerning for ovarian malignancy.  Lesion in left adnexa congruent with sigmoid colon in uterus with involvement not excluded.  Multiple pelvic lymph nodes present.  There is also a right renal mass.    INTERVAL EVENTS    Since her last visit she has had staging CT chest abdomen pelvis as well  as biopsy.  She comes discuss results.  She notes some nausea without vomiting and poor nutrition but without notable weight loss over the last couple weeks.  She has occasional abdominal upset depending on what she eats.  She does continue to struggle with constipation despite Colace and MiraLax.    Review of Systems    ?   A comprehensive 14-point review of systems was reviewed with patient and was negative other than as specified above.   ?     Objective:      Physical Exam      ?  Weight 86.7  Blood pressure 176/88  Pulse 103  Temperature 97.9°  SpO2 98%  Pain 8/10   There were no vitals filed for this visit.   ?   ECOG:?1   General appearance: Generally well appearing  Head, eyes, ears, nose, and throat:  moist mucous membranes.   Cardiovascular: Regular rate and rhythm, S1, S2, no audible murmurs.   Respiratory: Lungs clear to auscultation bilaterally.   Abdomen: soft, nontender, minimally distended.   Extremities: Warm, without edema   Neurologic: Alert and oriented. Grossly normal strength, coordination, and gait.   Skin: No rashes, ecchymoses or petechial lesion.     ?   Laboratory:  ?   None today.  ?   Tumor markers   ? 261  CEA 3  ?   Imaging:  ?  11/18/19  CT CHEST ABDOMEN PELVIS WITH CONTRAST (XPD)    CLINICAL HISTORY:  Neoplasm: ovarian, staging; Neoplasm of unspecified behavior of other genitourinary organ    TECHNIQUE:  Low dose axial images, sagittal and coronal reformations were obtained from the thoracic inlet to the pubic symphysis following the IV administration of 100 mL of Omnipaque 350 .  Oral contrast was also utilized.    COMPARISON:  Comparison is made with previous study November 1, 2019    FINDINGS:  Chest: There is a pattern of small bilateral pleural effusions with patchy edematous change with interstitial infiltrate in lung bases dependently posteriorly bilaterally.    There are sternal sutures from previous heart surgery.  There is atherosclerosis of the aorta.  This is mild.   The large vessels enhance appropriately.  No significant mediastinal or hilar adenopathy is demonstrated.  No significant axillary adenopathy is demonstrated.    Abdomen and pelvis: No free air is seen within the abdomen.    The liver appears free of focal abnormality.  The gallbladder has a pattern of increased attenuation with insulin suggesting the presence of stones.  No acute inflammation is demonstrated.  Small calcifications are seen within the spleen.  The spleen is within normal limits in size.  The pancreas appears free of focal abnormality.  The adrenal glands are normal in appearance.    An exophytic lesion from the lower pole of right kidney measures 1.5 cm and has the appearance of benign renal cyst.  Left kidney enhances well.  The ureters and bladder appear free of acute abnormality.    There is atherosclerosis of the aorta and branching vessels.  No occlusion or aneurysm is demonstrated.    Within the pelvis there is loss of normal anatomy.  The uterus has irregular margins.  The left adnexal region has an solid masslike irregular soft tissue lesion present which is inseparable from loops of sigmoid colon.  This aggregate area measures up to 4.8 4.0 cm.  This is irregular in its margins has the appearance of an infiltrating lesion.  There is a pattern of thickened abnormal omentum suggesting peritoneal metastatic disease anteriorly.  On series 2, image 117 is measures 11.6 cm transverse by 2.5 cm in thickness it is.  6 cm in length.  There are additional areas suggestive of peritoneal implantation.  Along the left paracolic gutter nodular lesion lying adjacent to the left colon measures 2.0 x 3.3 cm on series 2, image 100 with enhancing margins and irregular shape.  Smaller nodular areas are seen in the upper abdomen as well.  The bowel appears free of acute obstruction at this time.  Bones appear intact with no signs of bone metastatic disease.      Impression       Within the chest, there is a  pattern of bilateral small pleural effusions with edematous change in patchy interstitial infiltrate in the lung bases.    Within the pelvis, there is irregular spiculated stranded mass centered in the left adnexal region which may represent a primary ovarian neoplasm.  It has an infiltrative appearance and is inseparable from the sigmoid colon suggesting directive invasion.  Omental cake metastatic disease is present as well as peritoneal implants.  No significant ascites is present at this time.    All CT scans at this location or performed using dose modulation techniques as is appropriate to perform the exam including the following: Automated exposure control, adjustment of the mA and/or kv according to patient size, or the use of iterative reconstruction technique.         Pathology:    1.  Omental mass, biopsy:       -  Positive for malignant cells, see comment     Comment:  The biopsy consists of fibrous tissue infiltrated by clusters of   malignant cells that appear to be forming glands and papillary like   structures.  Highly pleomorphic and hyperchromatic nuclei are present.   Immunohistochemical stains are performed and show tumor cell positivity for   WT1 and cytokeratin 7.  Tumor cells are negative for estrogen receptor,   cytokeratin 20 and CDX2.  Given the patient's radiographic findings of an   adnexal mass in conjunction with the histologic papillary structure and   immunoprofile, a ovarian primary is highly favored.  Clinical and   radiographic correlation is necessary.  This case seen in consultation with   Dr. Hooker agrees with the above diagnosis.      ?   Assessment/Plan:       1. Peritoneal carcinomatosis    2. Nausea    3. Essential hypertension    4. Coronary artery disease involving native coronary artery of native heart without angina pectoris    5. S/P coronary artery stent placement    6. Type 2 diabetes mellitus without complication, without long-term current use of insulin    7.  "Ovarian malignancy, left    8. Peritoneal metastases          Plan:     # Ovarian carcinoma, FIGO Stage IIIC:  Omental biopsy positive for malignancy with papillary structure and immunohistochemistry consistent with ovarian primary, supported by imaging with left adnexal mass with peritoneal carcinomatosis.  elevated. I had a thorough discussion about natural history and treatment paradigm for advanced ovarian carcinoma. I discussed that the goal of treatment is to reduce disease burden and associated symptoms with goal of prolonging life and disease remission for as long as possible although a "cure" in such advanced disease is exceptionally rare and in many cases disease progression does occur in the future. While typically upfront debulking surgery is pursued, given her medical comorbidities and frail state as well as imaging demonstrating close approximation of tumor to bowel I have recommended neoadjuvant chemotherapy.  I discussed regimen of carboplatin and paclitaxel given on 3 week cycles; I reviewed potential toxicities of this regimen and importance of very close follow-up to assess for tolerance and adjust accordingly.  Given association of tumor with bowel and hypertension I would recommend against initial therapy with bevacizumab and can integrate later in course pending clinical response/tolerance.    - referral to Gynecologic Oncology  - notes difficult peripheral IV blood draws; she is currently on aspirin and Plavix for cardiovascular disease and metformin for type 2 diabetes and would prefer to avoid port placement which would require holding medications; I did discuss we can trial peripherally administering chemotherapy however if difficulty develops we would need to pursue port placement. Note: ordered IR port placement to be scheduled following planned C1D1 prior to C2 in case unable to obtain access.  - NP chemotherapy teaching   - cycle 1 day 1 chemotherapy pending insurance " authorization; labs  - recommend germline/somatic mutational testing for BRCA1/2 to guide future treatment/maintenance options    # Nausea:   # Constipation:  Reviewed over-the-counter use of senna, Colace, MiraLax and milk of magnesia with plenty of fluids to assist in constipation.  If no bowel movement and/or worsening nausea/vomiting I discussed concern for bowel obstruction especially given location of her malignancy a need to come for immediate evaluation that case.  Prescribed ondansetron 8 mg q.8 hours p.r.n. nausea.    # Home care needs:  She lives alone but requires much assistance of daughter who works during the day.  Given her frail state and plans for chemotherapy would recommend looking into home care options.  - home health referral made  - Social work referral made    # HTN, CAD, T2DM:  Recommended close follow-up with primary care.  I did discuss steroid use as premedication for her regimen which can cause poorly controlled blood glucose at times requiring insulin use.    # Exophytic right renal lesion:  Appears to be consistent with benign cyst, continue follow-up on future exams      Follow-Up:   One week after CT guided biopsy of peritoneal lesion.

## 2019-11-26 NOTE — PATIENT INSTRUCTIONS
-  referral  - home health referral  - new chemo start with pans pending insurance authorization  - gyn onc referral  - port placement by IR

## 2019-11-27 NOTE — TELEPHONE ENCOUNTER
OLAYINKA gibbs attempted to speak with pt to introduce her to SW services. Called the mobile number first and spoke with pt's daughter, Savanah. Pt's daughter informed OLAYINKA gibbs that she was currently not at home with the pt and has been taking calls on her behalf. Savanah stated the pt may need transportation assistance to her appts. SW will look into resources and f/u. Savanah verbalized she has the SW contact card and will contact as needed. Savanah also informed that if anyone attempts to contact pt on the home number that they may need to call a few times as it takes her awhile to get to the phone to answer. OLAYINKA gibbs attempted to call pt directly at home and left a VM. SW will f/u with pt at next visit or as needed.

## 2019-11-27 NOTE — PLAN OF CARE
START ON PATHWAY REGIMEN - Ovarian    [id number]        Paclitaxel (Taxol(R))       Carboplatin (Paraplatin(R))           Additional Orders: * All AUC calculations intended to be used in [place]   formula    **Always confirm dose/schedule in your pharmacy ordering system**    Patient Characteristics:  Preoperative or Nonsurgical Candidate (Clinical Staging), Newly Diagnosed,   Neoadjuvant Therapy Indicated  Therapeutic Status: Preoperative or Nonsurgical Candidate (Clinical Staging)  BRCA Mutation Status: Awaiting Test Results  AJCC T Category: cT3c  AJCC 8 Stage Grouping: IIIC  AJCC N Category: cNX  AJCC M Category: cM0  Therapy Plan: Neoadjuvant Therapy Indicated  Intent of Therapy:  Non-Curative / Palliative Intent, Discussed with Patient

## 2019-11-29 NOTE — NURSING
LM for patient to call me back to set up chemotherapy , education, lab and MD appts and to discuss decadron rx and instructions. Direct contact information left in message

## 2019-12-02 NOTE — PROGRESS NOTES
Iris Kearns,  Nurse   Sent Dr Ervin's staff a message regarding her conversation setting up home health visit.                         * spoke to , patients daughter to set up for home health services. She asked for start of care 12/3/19.

## 2019-12-02 NOTE — NURSING
Attempt made again to contact patient to set up chemo appts.  Alternated number tried today.  No answer.  LM my direct contact information left in message.

## 2019-12-02 NOTE — NURSING
Oncology Navigation   Intake  MD Assigned: matthew  Initial Nurse Navigator Contact: 11/27/19  Contact Method: Other  Other Contact Method: phone call from nurse  Date Worked: 12/02/19  Multiple appointments: Yes     Treatment  Treatment Type(s): Chemotherapy  Chemotherapy Regimen: TAxol carboplatin every 3 weeks       General Referrals: Social work;Other  Other Referral: home health// gyn/onc        Acuity      Follow Up  No follow-ups on file.   Spoke with pts daughter Savanah regarding setting up all appts related to starting chemotherapy. GYN oncology,  Chemo education with NP, lab, MD and chemo appts set up with date time and location agreeable to pts daughter who will be bringing her mother to all appts. Addresses and travel instructions given and repeated back correctly.  They would prefer treatments to be done at [facility] [place] location as this is easier to get to for them, however dr. Ervin will not be at that location for the first treatment, so they wanted to see her for the first treatment at the cancer center and then would request all other treatments to be done at the [place].    [facility] also notified of referral again (previously notified per Benita Dominique LPN on 111/26/19) so that someone could meet with the patient .. The daughter requested that someone come to the house tomorrow 12/3/19 instead of today.   Decadron Rx instructions given and repeated back correctly to take 20 mg 12 and 6 hours prior to chemo treatment time.  The daughter states they will bring the Rx to the teaching appt for clarification with the NP to make sure she takes it correctly.   My direct contact information and explanation or navigator role shared with pts daughter.  They will call me with any further concerns.

## 2019-12-03 NOTE — PROGRESS NOTES
REFERRING PROVIDER  Joanne Ervin MD     HISTORY OF PRESENT CONDITION  CC: Likely ovarian cancer  Jennifer Zhu is a 79 y.o. who presents in consultation at for an opinion regarding a new diagnosis of likely ovarian cancer.  - VB, VD, or changes in her stool or urine.  + Early satiety and decreased appetite.  Nutritional status is worsening.  Denies N/V.  Tolerating PO. +Flatus. +BM.  Abdominal pain that is intermittent and located to bilateral upper abdomen.  -Bloating.  Denies a h/o abnormal Pap smears, colonoscopies, or mammograms.        Malignant neoplasm of ovary, left    11/7/2019 Imaging Significant Findings     CT C/A/P: SANDER in the chest.  Complex L adnexal mass with direct invasion to the sigmoid colon.  Omental cake metastatic disease is present as well as peritoneal implants.  No significant ascites is present at this time.      11/7/2019 Tumor Markers      = 261      11/18/2019 Biopsy     Omental biopsy: WT1+, CK7+, CK20-      12/3/2019 -  Chemotherapy     Treatment Summary   Plan Name: OP GYN PACLITAXEL CARBOPLATIN (AUC 6) Q3W  Treatment Goal: Palliative  Status: Active  Start Date: 12/3/2019 (Planned)  End Date: 3/17/2020 (Planned)  Provider: Joanne Ervin MD  Chemotherapy: CARBOplatin (PARAPLATIN) in sodium chloride 0.9% 250 mL chemo infusion,  (original dose ), Intravenous, Clinic/HOD 1 time, 0 of 6 cycles  Dose modification:   (Cycle 1, Reason: MD Discretion, Comment: md discretion)  PACLitaxel (TAXOL) in sodium chloride 0.9% 500 mL chemo infusion, 135 mg/m2 (original dose ), Intravenous, Clinic/HOD 1 time, 0 of 6 cycles  Dose modification: 135 mg/m2 (Cycle 1, Reason: MD Discretion, Comment: md discretion)         REVIEW OF SYSTEMS  All systems reviewed and negative except as noted in HPI.    OBJECTIVE   Vitals:    12/04/19 1043   BP: (!) 157/87   Pulse: 84      Body mass index is 31.7 kg/m².      1. General: Frail appearing with significant difficulty getting on the examination  bed for an exam; the exam had to be conducted on her chair  2. Lymph: Neck symmetric without cervical or supraclavicular adenopathy or mass.  3. Lungs: Normal respiratory rate, no accessory muscle use.  4. Psych: Flat mood.  5. Cardiac: Normal rate  6. Abdomen:  non-distended, soft, non-tender, palpable continuous mass in the upper abdomen that is likely her omentum, no ascites, no hepatosplenomegaly.  7. Skin: Warm, dry, no rashes or lesions.   8. Extremities: Bilateral lower extremities without edema or tenderness.  9. Genitourinary: The patient could not tolerate an exam                ECOG status: 4    LABORATORY DATA  Lab data reviewed.    RADIOLOGICAL DATA  Radiology data reviewed.    PATHOLOGY DATA  Pathology data reviewed.    ASSESSMENT / PLAN     1. Malignant neoplasm of ovary, left    2. Coronary artery disease involving native coronary artery of native heart without angina pectoris    3. S/P coronary artery stent placement    4. Essential hypertension    5. Frailty    6. Type 2 diabetes mellitus without complication, without long-term current use of insulin       The patient is not a candidate for a PDS due to her age, frailty, and nutritional status (her albumin is in the lower limit of normal).  A PDS would likely be complex and involve an LAR.  I am very concerned about her functional status.  She spends most of the day in a chair and needs a cane to ambulate.  She really struggled to get on the examination table with assistance.  She seems extremely frail.    I discussed with the patient the role of neoadjuvant chemotheray for ovarian cancer and consideration for an IDS given her response to chemotherapy. I would like to re-evaluate her after completing 3 cycles of neoadjuvant chemotherapy to discuss the role of IDS, although I think surgery will do more harm than good with her functional status.  I will pay close attention to her response in the pelvis and if there is still concern for sigmoid colon  involvement.  She would be at significant risk of a grade 3 or 4 complication if an IDS requires more than a simple robotic hysterectomy, BSO, and dependent omentectomy.  It would not be of any benefit if there is residual disease. The patient voiced understanding.  All questions were answered.    -Could consider SANDY-7 regimen if the patient doesn't tolerate every 3 weeks.  -Could also consider a PICC for CT but agree that a IV port-a-catheter would be preferable.  -Cardiology consultation to discuss surgery clearance in case the patient is a candidate for a IDS.  -RTC after 3 cycles of NACT to evaluate candidacy for an IDC  -Consider STRATA testing now as the patient is unlikely to benefit or tolerate an IDS.      PATIENT EDUCATION  Ready to learn, no apparent learning barriers were identified; learning preferences include listening.  Explained diagnosis and treatment plan; patient expressed understanding of the content.      Kyle Mojica

## 2019-12-04 NOTE — LETTER
December 4, 2019      Joanne Ervin MD  [address]  [place] [address]            Cancer Center - GYN Oncology  [address] [facility] [address]  Phone: [phone]  Fax: [phone]          Patient: Jennifer Zhu   MR Number: [id number]   YOB: 1940   Date of Visit: 12/4/2019       Dear Dr. Joanne Ervin:    Thank you for referring Jennifer Zhu to me for evaluation. Attached you will find relevant portions of my assessment and plan of care.    If you have questions, please do not hesitate to call me. I look forward to following Jennifer Zhu along with you.    Sincerely,    Kyle Mojica MD    Enclosure  CC:  No Recipients    If you would like to receive this communication electronically, please contact [email or web address] or [phone] to request more information on [employer] Link access.    For providers and/or their staff who would like to refer a patient to [facility], please contact us through our one-stop-shop provider referral line, [facility], at [phone].    If you feel you have received this communication in error or would no longer like to receive these types of communications, please e-mail [email or web address]

## 2019-12-04 NOTE — PROGRESS NOTES
80 y/o female who presents to the [facility] today for chemotherapy teaching.  Patient given the Navigation Notebook.  I had a detailed discussion with patient in regards to how to use notebook.  I had a detailed discussion with patient regarding the rationale for chemotherapy, how the chemotherapy works, the process of treatment, potential side effects along with managing the potential side effects.  Also discussed with patient signs and symptoms to report.     I had a detailed discussion with the patient and reviewed the specific medication(s) and gave them a handout describing the potential side effects of TAXOL/CARBOPLATIN along with the management of those potential side effects. Also discussed with patient signs and symptoms to report.     Discussed in detail potential side effects such as:  Nausea and vomiting  Bone Marrow Suppression  Thrombocytopenia precautions  Anemia  Leukopenia  Fatigue  Anorexia  Alopecia  Stomatitis  Diarrhea  Cystitis  Gastritis  Fever > 100.4  Antiemetics instructions  Skin care  Constipation  Hyperpigmentation  Rash  Photosensitivity  Sunscreen SPF 30   Small frequent meals  Increased protein  Increased calories  Vitamin support   Taste alterations  Neuropathys  Increased Hydration  Renal toxicity   DVTs  Stress   Depression   Port management  Community resources  Importance of monitoring blood sugars if diabetic and reporting elevations or lows    70 minutes total face to face time spent with patient. 60 minutes spent completing chemotherapy teaching. 10 minutes spent addressing questions, concerns, and obtaining consent.

## 2019-12-05 NOTE — PROGRESS NOTES
"PODIATRY NOTE  PCP: Dr. Gómez Najera MD, MD    CHIEF COMPLAINT   Chief Complaint   Patient presents with    Routine Foot Care     PCP: Dr. Najera last seen on 9/3/19 by Dr. Doran; pt reports pain in feet rated about 4/10 at present.       HPI  Jennifer Zhu is a 79 y.o. female who has a past medical history of Arthritis, Diabetes mellitus, type 2, GERD (gastroesophageal reflux disease), Hyperlipidemia, Hypertension, and Uterine prolapse.  Jennifer presents to clinic today for at risk nail care. Pt complains about thickened elongated painful toenails. She states relief is obtained with nail trimming.  Patient has recently been diagnosed with ovarian cancer.  Daughter is accompanied with patient today and states that patient will start chemo next week.  Patient does have lesion on bottom of great toe which has been present for several years. Patient denies other pedal complaints at this time.    No results found for: HGBA1C    REVIEW OF SYSTEMS  General: Denies any fever or chills  Chest: Denies shortness of breath, wheezing, coughing, or sputum production  Heart: Denies chest pain, cold extremities, orthopenia, or reduced exercise tolerance  As noted above and per history of current illness above, otherwise negative in the remainder of the 14 systems.     PHYSICAL EXAM  Vitals:    12/05/19 1041   BP: 135/75   Pulse: 83   Weight: 89.5 kg (197 lb 5 oz)   Height: 5' 6" (1.676 m)   PainSc:   4       General: This patient is well-developed, well-nourished and appears stated age, well-oriented to person, place and time, and cooperative and pleasant on today's visit      LOWER EXTREMITY  Vascular exam:   Dorsalis pedis and posterior tibial pulses palpable 1/4 bilaterally.   Capillary refill time immediate to the toes.   Feet are warm to the touch. Skin temperature warm to warm from proximally to distally   There are no varicosities, telangiectasias noted to bilateral foot and ankle regions.   There are no ecchymoses " noted to bilateral foot and ankle regions.   There is gross lower extremity edema LEFT>right     Dermatologic exam:   Skin moist with healthy texture and turgor.  There are no open ulcerations, lacerations, or fissures to bilateral foot and ankle regions. There are no signs of infection as there is no erythema, no proximal-extending lymphangiitis, no fluctuance, or crepitus noted on palpation to bilateral foot and ankle regions.   There is no interdigital maceration.   Nails are thickened elongated x 10.  There is plantar hyperpigmentation flat lesion sub LEFT great toe measuring 1.5 cm x 1.0 cm, homogenous in color     Neurologic exam:  Epicritic sensation is intact as the patient is able to sense light touch to bilateral foot and ankle regions.   Achilles and patellar deep tendon reflexes intact  Babinski reflex absent    Musculoskeletal/Orthopedic exam:   No symptomatic structural abnormalities noted  There is MAXIMALLY pronated foot type with RCSP eversion noted  Muscle strength AT/EHL/EDL/PT: 5/5; Achilles/Gastroc/Soleus: 5/5; PB/PL: 5/5 Muscle tone is normal.  Ankle joint ROM  B/L supple DF/PF, non-crepitus        ASSESSMENT  Encounter Diagnoses   Name Primary?    Skin lesion of foot - Left Foot Yes    Dermatophytosis of nail     Type 2 diabetes mellitus without complication, without long-term current use of insulin     Arterial insufficiency     Corn or callus          PLAN    1. Patient was educated about clinical and imaging findings, and verbalizes understanding of above.  2. -With patient's permission, the elongated onychomycotic toenails, as outlined in the physical examination, were sharply debrided/trimmed with a double action nail nipper to their soft tissue attachment. If indicated, the nails were then smoothed down in thickness with a mechanical rotary shawna and/or tyler board to facilitate in further debridement removing all offending nail and subungual debris  3.  I did discuss with daughter  lesion on plantar hallux.  This lesion has been present for years however based on history of recently diagnosed ovarian cancer, I recommend punch biopsy of lesion.  I did discuss arterial insufficiency and healing.  Daughter wishes to wait for biopsy after chemotherapy.  We will plan on biopsy at follow-up visit in 3 months.  I did discuss of lesion increases in size that she come in essence possible to have confirmation on diagnosis of lesion.    Future Appointments   Date Time Provider Department Center   12/9/2019  8:30 AM DAVE PA [facility] LAB [facility] LAB DS BR   12/9/2019 10:20 AM Joanne Ervin MD [facility] HEM ONC BR   12/9/2019 10:30 AM CHAIR 07 [facility] CHEMO BR   3/5/2020  9:45 AM Selena De La Rosa DPM [facility] POD [place]           Report Electronically Signed By:  Selena De La Rosa DPM   Podiatric Medicine & Surgery  [facility]  12/5/2019

## 2019-12-06 NOTE — NURSING
"Received phone calls from Daughter Savanah today regarding setting up PCA for patient .  See below copy of in basket message that was sent to Aziza Colvin , her staff, and the social work team to obtain advice on how to provide help.       I received a call from this patient's daughter Savanah Zhu this am requesting a daily PCA for her mother from 7 am - 6 pm  Every day .       She states that her current  home health nurse's name is Yen with [facility].       She would like Long Term Care provided by [facility] at # [phone] .  The daughter also spoke to the provider at this Long Term Care Facility and found out that our Dr. Ervin would need to indicate in an order to them that the patient would need to be placed on a "Waiver" list to extend care beyond 32 hours per week.       The patient will be seeing Dr. Ervin 12/9/19 for chemotherapy start.     Daughter's phone number is [phone]     I am awaiting a response on how to honor this request and informed the pts daughter that I will reach back out to her once I know more information.  The patient has an appt 12/9/19 with Dr. Ervin to begin chemotherapy which I researched this am and found to be approved per her insurance. The daughter and patient have my direct contact information and will contact me back if there arise any further concerns.   Oncology Navigation   Intake  MD Assigned: aziza  Initial Nurse Navigator Contact: 11/27/19  Contact Method: Other  Other Contact Method: phone call from nurse  Date Worked: 12/06/19  Multiple appointments: Yes     Treatment  Treatment Type(s): Chemotherapy  Chemotherapy Regimen: TAxol carboplatin every 3 weeks       General Referrals: Social work;Other  Other Referral: home health// gyn/onc        Acuity  Systemic Treatment - predicted or initiated: Chemotherapy regimen with multiple drugs (+1)  Treatment Tolerability: Has not started treatment yet/treatment fully completed and side effects " resolved  ECO   Needed: 0  Navigation Acuity: 3     Follow Up  Follow up in about 4 days (around 12/10/2019).

## 2019-12-09 NOTE — NURSING
Received voicemail from pts daughter this morning asking if it would be okay for her mother to take her morning medications prior to treatment today.  Daughter also asked if it would be okay to eat something prior to coming for treatment.  Called daughter back and notified that it would be fine for her to take her morning medications prior to coming.  Also notified that we recommend the pt to have a light breakfast.  Explained that something heavy or fried that could cause her reflux might make her feel sick to her stomach.  Explained that is not recommended.  After hanging up the phone daughter called right back asking what they should bring to appointment.  Daughter was questioning bringing pillow and blanket.  Explained to daughter that we have pillows and blankets here for the patients.  Also notified that we have snacks and juice but if pt would like anything else to eat they could bring from home or she could go .  Daughter verbalized understanding.

## 2019-12-09 NOTE — PROGRESS NOTES
Subjective:      DATE OF VISIT: 12/9/19     ?  Patient ID:?Jennifer Zhu is a 79 y.o. female.?? MR#: [id number]   ?   PRIMARY ONCOLOGIST: Dr. Ervin      ? Primary Care Providers:  Gómez Najera MD, MD (General)     CHIEF COMPLAINT:  Follow-up  ?   ONCOLOGIC DIAGNOSIS:  FIGO stage IIIC ovarian carcinoma  ?   CURRENT TREATMENT:  Carboplatin and paclitaxel, Q 3 weeks  ?   INTERVAL EVENTS    Ms. Zhu presents with her daughter today for cycle 1 day 1 chemotherapy.  She is in stable condition and daughter notes improvement in appetite although no weight gain yet.  Energy is low but stable.  No infectious symptoms or other new issues or concerns.  She did have chemotherapy teaching and has antiemetics for home use as needed.    ONCOLOGIC HISTORY:   ?   Ms. Zhu has a medical comorbidites including CAD with CABG in 2011, type 2 diabetes, hypertension, dysphagia with esophageal dilation in 2011, chronic const[facility]      Since June 2019 she has had worsened constipation, and progressive abdominal pain with mild distension, early satiety and 25 lb weight loss in the last 2.5 months.  Abdominal pain is diffuse with some relief after bowel movements.  She denies any melena or hematochezia.  She has had nausea without vomiting.  She has a history of dysphagia with esophageal dilation in 2011 and this has been stable.  No done dysphagia or hemoptysis. She does have occasional mild headache not requiring medication. She is up-to-date on mammography with last in 2019 at [facility]. She had pap smears in distant past without abnormality. No recent gynecologic follow-up. She has not had vaginal bleeding or discharge.    Urine culture from 09/03/2019 showed infection and she was started on antibiotics.  Follow-up UA on 10/17/2019 showed microscopic hematuria.  CT renal stone was performed on 11/01/2019 which revealed peritoneal carcinomatosis with abnormality in bilateral  adnexa concerning for ovarian malignancy.  Lesion in left adnexa congruent with sigmoid colon in uterus with involvement not excluded.  Multiple pelvic lymph nodes present.  There is also a right renal mass.        Review of Systems    ?   A comprehensive 14-point review of systems was reviewed with patient and was negative other than as specified above.   ?     Objective:      Physical Exam      ?  Weight 86.7  Blood pressure 176/88  Pulse 103  Temperature 97.9°  SpO2 98%  Pain 8/10   There were no vitals filed for this visit.   ?   ECOG:?1   General appearance: Generally well appearing  Head, eyes, ears, nose, and throat:  moist mucous membranes.   Cardiovascular: Regular rate and rhythm, S1, S2, no audible murmurs.   Respiratory: Lungs clear to auscultation bilaterally.   Abdomen: soft, nontender, minimally distended.   Extremities: Warm, without edema   Neurologic: Alert and oriented. Grossly normal strength, coordination, and gait.   Skin: No rashes, ecchymoses or petechial lesion.     ?   Laboratory:  ?   Lab Results   Component Value Date    WBC 4.52 12/09/2019    HGB 13.7 12/09/2019    HCT 44.1 12/09/2019    MCV 99 (H) 12/09/2019     12/09/2019         Chemistry        Component Value Date/Time     12/09/2019 1043    K 3.4 (L) 12/09/2019 1043     12/09/2019 1043    CO2 23 12/09/2019 1043    BUN 7 (L) 12/09/2019 1043    CREATININE 0.8 12/09/2019 1043     (H) 12/09/2019 1043        Component Value Date/Time    CALCIUM 10.2 12/09/2019 1043    ALKPHOS 53 (L) 12/09/2019 1043    AST 25 12/09/2019 1043    ALT 14 12/09/2019 1043    BILITOT 0.5 12/09/2019 1043    ESTGFRAFRICA >60 12/09/2019 1043    EGFRNONAA >60 12/09/2019 1043          ?   Tumor markers   ? 261  CEA 3  ?   Imaging:  ?  11/18/19  CT CHEST ABDOMEN PELVIS WITH CONTRAST (XPD)    CLINICAL HISTORY:  Neoplasm: ovarian, staging; Neoplasm of unspecified behavior of other genitourinary organ    TECHNIQUE:  Low dose axial images,  sagittal and coronal reformations were obtained from the thoracic inlet to the pubic symphysis following the IV administration of 100 mL of Omnipaque 350 .  Oral contrast was also utilized.    COMPARISON:  Comparison is made with previous study November 1, 2019    FINDINGS:  Chest: There is a pattern of small bilateral pleural effusions with patchy edematous change with interstitial infiltrate in lung bases dependently posteriorly bilaterally.    There are sternal sutures from previous heart surgery.  There is atherosclerosis of the aorta.  This is mild.  The large vessels enhance appropriately.  No significant mediastinal or hilar adenopathy is demonstrated.  No significant axillary adenopathy is demonstrated.    Abdomen and pelvis: No free air is seen within the abdomen.    The liver appears free of focal abnormality.  The gallbladder has a pattern of increased attenuation with insulin suggesting the presence of stones.  No acute inflammation is demonstrated.  Small calcifications are seen within the spleen.  The spleen is within normal limits in size.  The pancreas appears free of focal abnormality.  The adrenal glands are normal in appearance.    An exophytic lesion from the lower pole of right kidney measures 1.5 cm and has the appearance of benign renal cyst.  Left kidney enhances well.  The ureters and bladder appear free of acute abnormality.    There is atherosclerosis of the aorta and branching vessels.  No occlusion or aneurysm is demonstrated.    Within the pelvis there is loss of normal anatomy.  The uterus has irregular margins.  The left adnexal region has an solid masslike irregular soft tissue lesion present which is inseparable from loops of sigmoid colon.  This aggregate area measures up to 4.8 4.0 cm.  This is irregular in its margins has the appearance of an infiltrating lesion.  There is a pattern of thickened abnormal omentum suggesting peritoneal metastatic disease anteriorly.  On series 2,  image 117 is measures 11.6 cm transverse by 2.5 cm in thickness it is.  6 cm in length.  There are additional areas suggestive of peritoneal implantation.  Along the left paracolic gutter nodular lesion lying adjacent to the left colon measures 2.0 x 3.3 cm on series 2, image 100 with enhancing margins and irregular shape.  Smaller nodular areas are seen in the upper abdomen as well.  The bowel appears free of acute obstruction at this time.  Bones appear intact with no signs of bone metastatic disease.      Impression       Within the chest, there is a pattern of bilateral small pleural effusions with edematous change in patchy interstitial infiltrate in the lung bases.    Within the pelvis, there is irregular spiculated stranded mass centered in the left adnexal region which may represent a primary ovarian neoplasm.  It has an infiltrative appearance and is inseparable from the sigmoid colon suggesting directive invasion.  Omental cake metastatic disease is present as well as peritoneal implants.  No significant ascites is present at this time.    All CT scans at this location or performed using dose modulation techniques as is appropriate to perform the exam including the following: Automated exposure control, adjustment of the mA and/or kv according to patient size, or the use of iterative reconstruction technique.         Pathology:    1.  Omental mass, biopsy:       -  Positive for malignant cells, see comment     Comment:  The biopsy consists of fibrous tissue infiltrated by clusters of   malignant cells that appear to be forming glands and papillary like   structures.  Highly pleomorphic and hyperchromatic nuclei are present.   Immunohistochemical stains are performed and show tumor cell positivity for   WT1 and cytokeratin 7.  Tumor cells are negative for estrogen receptor,   cytokeratin 20 and CDX2.  Given the patient's radiographic findings of an   adnexal mass in conjunction with the histologic papillary  structure and   immunoprofile, a ovarian primary is highly favored.  Clinical and   radiographic correlation is necessary.  This case seen in consultation with   Dr. Hooker agrees with the above diagnosis.      ?   Assessment/Plan:       1. Malignant neoplasm of ovary, left          Plan:     # Ovarian carcinoma, FIGO Stage IIIC:  Presents today to initiate chemotherapy with carboplatin and paclitaxel.  We will initiate with plan for Q three-week cycle and closely monitor for toxicity; if difficulty tolerating this can switch to weekly therapy.  Labs reviewed within acceptable limits and will proceed with C1D1 therapy today.   - seen by Gynecologic Oncology and agrees with neoadjuvant chemotherapy and may consider debulking surgery although given poor baseline functional status this is less likely felt to be beneficial  - STRATA testing requested - pending  - notes difficult peripheral IV blood draws; she is currently on aspirin and Plavix for cardiovascular disease and metformin for type 2 diabetes and would prefer to avoid port placement which would require holding medications; I did discuss we can trial peripherally administering chemotherapy however if difficulty develops we would need to pursue port placement. Note: ordered IR port placement to be scheduled following planned C1D1 prior to C2 in case unable to obtain access.    # hypokalemia:  Mild, will give potassium with IV fluid hydration today.    # Hyperglycemia:  Previously discussed likely side effect of steroid use.  Need to follow-up with primary care and just regimen as needed.  Sliding scale insulin as needed.    # Home care needs:  She lives alone but requires much assistance of daughter who works during the day.  Given her frail state and plans for chemotherapy would recommend looking into home care options.  Social workers is assisting in arranging this.    # HTN, CAD, T2DM:  Recommended close follow-up with primary care.  I did discuss steroid  use as premedication for her regimen which can cause poorly controlled blood glucose at times requiring insulin use.    # Exophytic right renal lesion:  Appears to be consistent with benign cyst, continue follow-up on future exams      Follow-Up:   In 1 week for RV and labs only for toxicity check  In 3 weeks RV with labs and chemo

## 2019-12-09 NOTE — PLAN OF CARE
Patient has fear about her first treatment today, but otherwise no complaints. Vitals stable. Treatment plan in process.

## 2019-12-10 NOTE — PROGRESS NOTES
Met with pt yesterday PM to complete initial SW assessment of needs. Pt's daughter Savanah present. Provided her with SW supportive care packet. Reviewed resources, including Cancer Services and [employer]. Referrals requested/made to each organization for their resources. Savanah works at the [employer] which is down the street from Cancer Services so she will register the pt. Other needs identified include the need for daughter to potentially apply for FMLA depending on patient's treatment and how she responds. She will let SW know if she needs help with this. Pt has HH services through [facility]. . Main issue today is getting pt re-approved for LTC services through Medicaid. Savanah is aware that their is typically a waiting list for the waiver program but is hopeful that orders from MD deeming pt needing 24 hr care will help put her at the top of the list. SW will contact Medicaid LTC to determine how we can be of support there. Daughter mainly needs help with pt during the day so that she can go to work; she says she is able to care for pt herself at night.     Advance Care Planning   Savanah states that she is patient's POA and that Advance Directive paperwork has been put on file.        Provided Savanah with SW contact info to call as needs arise. Will f/u as per above. SW team will remain available to assist with ongoing needs throughout pt's treatment.

## 2019-12-10 NOTE — DISCHARGE INSTRUCTIONS
[facility]  [address] [address]  [address]  [phone] phone     [phone] fax  Hours of Operation: Monday- Friday 8:00am- 5:00pm  After hours phone  [phone]  Hematology / Oncology Physicians on call      BRAD [address] Dr., Dr., Dr., Dr., NP Sydney Prescott, NP Tyesha Taylor, NP    Please call with any concerns regarding your appointment today.

## 2019-12-10 NOTE — NURSING
Met with patient and daughter in infusion room today to discuss need for medi-port as requested by infusion nurse from yesterday's treatment.  Mediport kit education performed with patient and daughter Savanah.  Instructed on mediport uses , risks (infection and blood clots , but not limited to these two)   Oncology Navigation   Intake  MD Assigned: [email or web address]  Initial Nurse Navigator Contact: 19  Contact Method: Phone  Other Contact Method: phone call from nurse  Date Worked: 19  Multiple appointments: Yes     Treatment  Treatment Type(s): Chemotherapy  Chemotherapy Regimen: TAxol carboplatin every 3 weeks       General Referrals: Social work;Other  Other Referral: home health// gyn/onc        Acuity  Systemic Treatment - predicted or initiated: Chemotherapy regimen with multiple drugs (+1)  Treatment Tolerability: 1  ECO  Comorbidities in Medical History: 2   Needed: 0  Support: 0  Transportation: 1  Psychological Factors (+1 each): QOL issues;Other (daughter reqeusts PCA for >32 hours / week)  Other Factors (+1 for Each): 3 (daughter expresses need for PCA>32 hrs / week / need for nurtrition ./ meals/ transportation)  Navigation Acuity: 12     Follow Up  No follow-ups on file.    and benefits.  They both expressed understanding and verified intent to have mediport placed per Dr. Ervin's orders.  Interventional radiology contacted and Medi-port is scheduled for 19 at 1100am .  Instructions for fasting for 6 hours prior to prodecure and for patient to be off of all ASA and blood thinners for 5 days prior to procedure and the need for a  to bring patient home following procedure discussed with patient and daughter. They repeated instructions back to me correctly and wrote appts down. Instructions of where and when to report for procedure reviewed and they are correct in communicating this back to me.     I V to right anticubital vein using 24g jelco started per myself as requested  by infusion nurse due to patient being a difficult stick today. See infusion notes.     Pt will contact me with any further concerns.  They have my direct contact information.

## 2019-12-11 NOTE — TELEPHONE ENCOUNTER
had 5 pridisone table prior to chemo and caused her  blood sugar was elevated because of it, she  daughter stated, to nurse she was told to call the clinic and report this if it happened.   Blood sugar today was 202 this morning, taken by pt's daughter, Pt free from symptoms of hyperglycemia,

## 2019-12-11 NOTE — PROGRESS NOTES
Family called about patients blood sugar being elevated at 274. She did not take her med during the day. She took 500 mg of metformin this evening. I asked that she take another 500 mg and recheck in 1 hour.    Dr. Gusman

## 2019-12-11 NOTE — TELEPHONE ENCOUNTER
Reason for Disposition   [1] Follow-up call from patient regarding patient's clinical status AND [2] information urgent   Blood glucose  mg/dl (3.9 -13 mmol/l)    Additional Information   Negative: Unconscious or difficult to awaken   Negative: Acting confused (e.g., disoriented, slurred speech)   Negative: Very weak (e.g., can't stand)   Negative: Sounds like a life-threatening emergency to the triager   Negative: [1] Vomiting AND [2] signs of dehydration (e.g., very dry mouth, lightheaded, etc.)   Negative: [1] Blood glucose > 240 mg/dl (13 mmol/l) AND [2] rapid breathing   Negative: Blood glucose > 500 mg/dl (27.5 mmol/l)   Negative: [1] Blood glucose > 240 mg/dl (13 mmol/l) AND [2] urine ketones moderate-large (or more than 1+)   Negative: [1] Blood glucose > 240 mg/dl (13 mmol/l) AND [2] blood ketones > 1.5 mmol/l   Negative: [1] Blood glucose > 240 mg/dl (13 mmol/l) AND [2] vomiting AND [3] unable to check for ketones (in blood or urine)   Negative: [1] New onset Diabetes suspected (e.g., frequent urination, weak, weight loss) AND [2] vomiting or rapid breathing   Negative: Vomiting lasts > 4 hours   Negative: Patient sounds very sick or weak to the triager   Negative: Fever > 100.5 F (38.1 C)   Negative: Blood glucose > 400 mg/dl (22 mmol/l)   Negative: [1] Blood glucose > 300 mg/dl (16.7 mmol/l) AND [2] two or more times in a row   Negative: Urine ketones moderate - large (or blood ketones > 1.5 mmol/l)   Negative: Caller has URGENT medication or insulin pump question and triager unable to answer question   Negative: [1] Symptoms of high blood sugar (e.g., frequent urination, weak, weight loss) AND [2] not able to test blood glucose   Negative: New onset diabetes suspected (e.g., frequent urination, weakness, weight loss)   Negative: Caller has NON-URGENT medication question about med that PCP prescribed and triager unable to answer question   Negative: [1] Blood glucose >  "240  mg/dl (13 mmol/l) AND [2] does not  use insulin (e.g., not insulin-dependent; most people with type 2 diabetes)   Negative: [1] Blood glucose > 240 mg/dl (13 mmol/l) AND [2] uses insulin (e.g., insulin-dependent, all people with type 1 diabetes)    Protocols used: PCP CALL - NO TRIAGE-A-, DIABETES - HIGH BLOOD SUGAR-A-    Pt's Daughter Savanah stated she spoke with Dr. Gusman at 8 pm to report the Pt's blood sugar was 277, stated MD told her to take Metformin and call back in an hour with the result. During our call blood sugar is now 238. Asked  to page MD.   stated MD wants to be contacted on secure chat. Message above sent to MD. Asked MD to advise, or call Pt's Daughter. MD responded "Tell her not to worry and to recheck in the morning."  Daughter informed of MD's response. Care advice, and when to call back provided from protocol. Provided with [facility] number and advised we are here 24/7 if she has any other questions or concerns.  "

## 2019-12-12 NOTE — PROGRESS NOTES
Attempted to speak to someone at [employer] services, phone number [phone]. SW was instructed via automated system to send provider inquiries to [email or web address]. Sent e-mail with request. Included colleagues for follow-up over the next week and SW will f/u upon return.

## 2019-12-12 NOTE — TELEPHONE ENCOUNTER
----- Message from Toma Velez sent at 12/12/2019  3:18 PM CST -----  Contact: Daughter  Type:  Patient Returning Call    Who Called: the pt daughter  Who Left Message for Patient: unknown  Does the patient know what this is regarding?: no  Would the patient rather a call back or a response via [facility]? Call back  Best Call Back Number: [phone]  Additional Information: n/a

## 2019-12-12 NOTE — TELEPHONE ENCOUNTER
----- Message from Ijeoma Nelson sent at 12/12/2019  9:36 AM CST -----  Contact: DAUGHTER  Type:  Needs Medical Advice    Who Called: DAUGHTER  Symptoms (please be specific): BLOOD SUGAR 267 AND NAUSEA   How long has patient had these symptoms:  TODAY  Pharmacy name and phone #:    [facility] [place] [address]  [address]  [place] [address]  Phone: [phone] Fax: [phone]    Would the patient rather a call back or a response via [facility]? CALL  Best Call Back Number: [phone]  Additional Information: PLEASE CALL TODAY ASAP URGENT!!

## 2019-12-13 NOTE — TELEPHONE ENCOUNTER
----- Message from Sandra Maddox sent at 12/13/2019  1:35 PM CST -----  Contact: Savanah   ..Type:  Patient Returning Call    Who Called: Savanah   Who Left Message for Patient:   Does the patient know what this is regarding?: pain medication for pt   Would the patient rather a call back or a response via [facility]?  Call back   Best Call Back Number: [phone]  Additional Information:

## 2019-12-13 NOTE — TELEPHONE ENCOUNTER
Patient is trialed Tylenol but continues to have pain.  I prescribed oxycodone 5 mg tablets take every 6 hr as needed.  Okay did continued use Tylenol up to 4 tabs 500 mg daily along with this.  If she is continuing to have significant pain I advised that she comes to the emergency department for further evaluation.  She should be seen in clinic next week to check in.    ----- Message from Benita Treviño LPN sent at 12/13/2019  1:32 PM CST -----  Contact: pt daughter   Pt is requesting a refill on pain medication due to pain, please send to   [facility]   ----- Message -----  From: Rachael Urias  Sent: 12/13/2019   9:17 AM CST  To: [facility]iza GUZMAN Staff    Type:  Needs Medical Advice    Who Called: Savanah  Symptoms (please be specific):  Pain   How long has patient had these symptoms:  The last 2 [facility]   Pharmacy name and phone #:       [facility] [place] [address]  [address]  [place] [address]  Phone: [phone] Fax: [phone]    Would the patient rather a call back or a response via [facility]? Call   Best Call Back Number:  [phone] (home)    Additional Information:  Caller is requesting a call back from the nurse in regards to the pt needing something call in for her pain please

## 2019-12-15 PROBLEM — D69.6 THROMBOCYTOPENIA: Status: ACTIVE | Noted: 2019-01-01

## 2019-12-15 PROBLEM — N30.00 ACUTE CYSTITIS WITHOUT HEMATURIA: Status: ACTIVE | Noted: 2019-01-01

## 2019-12-15 PROBLEM — E87.6 HYPOKALEMIA: Status: ACTIVE | Noted: 2019-01-01

## 2019-12-15 PROBLEM — K12.30: Status: ACTIVE | Noted: 2019-01-01

## 2019-12-15 PROBLEM — D70.9: Status: ACTIVE | Noted: 2019-01-01

## 2019-12-15 PROBLEM — R53.1 WEAKNESS: Status: ACTIVE | Noted: 2019-01-01

## 2019-12-15 NOTE — PROVIDER PROGRESS NOTES - EMERGENCY DEPT.
Encounter Date: 12/15/2019    ED Physician Progress Notes       SCRIBE NOTE: I, Gianna Fletcher, am scribing for, and in the presence of,  Alba Cueto MD.  Physician Statement: I, Alba Cueto MD, personally performed the services described in this documentation as scribed by Gianna Fletcher in my presence, and it is both accurate and complete.          4:00 PM: Unable to get patient in wheelchair secondary to extreme weakness. Daughter feels uncomfortable taking her home and would like her to be admitted to the hospital.    4:06 PM: Discussed case with Aviva Delgado NP ([facility]). Dr. Almendarez agrees with current care and management of pt and accepts admission.   Admitting Service: hospital medicine  Admitting Physician: Dr. Almendarez  Admit to: observation    4:07 PM: I have discussed test results, shared treatment plan, and the need for admission with patient and family at bedside. Pt and family express understanding at this time and agree with all information. All questions answered. Pt and family have no further questions or concerns at this time. Pt is ready for admit.          Scribe Attestation:   Scribe #1: I performed the above scribed service and the documentation accurately describes the services I performed. I attest to the accuracy of the note.    Attending Attestation:           Physician Attestation for Scribe:  Physician Attestation Statement for Scribe #1: I, Alba Cueto MD, reviewed documentation, as scribed by Gianna Fletcher in my presence, and it is both accurate and complete.

## 2019-12-15 NOTE — ED PROVIDER NOTES
SCRIBE #1 NOTE: I, Gianna Fletcher, am scribing for, and in the presence of, Alba Cueto MD. I have scribed the entire note.       History     Chief Complaint   Patient presents with    dry throat     pt complains of dry throat     Review of patient's allergies indicates:   Allergen Reactions    Lisinopril Swelling     Lips swelling    Penicillins Anaphylaxis    Sulfa (sulfonamide antibiotics) Hives         History of Present Illness     HPI    12/15/2019, 10:58 AM  History obtained from the patient      History of Present Illness: Jennifer Zhu is a 79 y.o. female patient with a PMHx of DM, GERD, HLD, HTN, and ovarian cancer who presents to the Emergency Department for evaluation of dry and sore throat which onset gradually last night. Symptoms are constant and moderate in severity. No mitigating or exacerbating factors reported. Associated sxs include generalized weakness and slurred speech. [facility] reports patient was able to ambulate with assistance to the stretcher. Daughter states last night speech was slow but today it is slurred. Patient denies any fever, chills, n/v/d, abdominal pain, rhinorrhea, cough, extremity weakness/numbness, dizziness, and all other sxs at this time. Patient had first chemo tx on 12/9/2019 and sees Dr. Ervin. No further complaints or concerns at this time.         Arrival mode: [facility]    PCP: Gómez Najera MD, MD        Past Medical History:  Past Medical History:   Diagnosis Date    Arthritis     Diabetes mellitus, type 2     GERD (gastroesophageal reflux disease)     Hyperlipidemia     Hypertension     Uterine prolapse        Past Surgical History:  Past Surgical History:   Procedure Laterality Date    BLADDER SUSPENSION      CORONARY ARTERY BYPASS GRAFT      x3    INTRAOCULAR LENS INSERTION      left eye         Family History:  History reviewed. No pertinent family history.    Social History:  Social History     Tobacco Use    Smoking status: Never Smoker     Smokeless tobacco: Never Used   Substance and Sexual Activity    Alcohol use: No    Drug use: No    Sexual activity: Not Currently        Review of Systems     Review of Systems   Constitutional: Negative for activity change, appetite change, chills, diaphoresis, fatigue and fever.        (+) generalized weakness   HENT: Positive for sore throat. Negative for congestion, ear pain, nosebleeds, rhinorrhea, sinus pain and trouble swallowing.         (+) dry throat   Eyes: Negative for pain and discharge.   Respiratory: Negative for cough, chest tightness, shortness of breath, wheezing and stridor.    Cardiovascular: Negative for chest pain, palpitations and leg swelling.   Gastrointestinal: Negative for abdominal distention, abdominal pain, blood in stool, constipation, diarrhea, nausea and vomiting.   Genitourinary: Negative for difficulty urinating, dysuria, flank pain, frequency, hematuria and urgency.   Musculoskeletal: Negative for arthralgias, back pain, myalgias and neck pain.   Skin: Negative for pallor, rash and wound.   Neurological: Positive for speech difficulty (slurred). Negative for dizziness, syncope, weakness, light-headedness, numbness and headaches.   Hematological: Does not bruise/bleed easily.   Psychiatric/Behavioral: Negative for confusion and self-injury.   All other systems reviewed and are negative.       Physical Exam     Initial Vitals   BP Pulse Resp Temp SpO2   12/15/19 1054 12/15/19 1054 12/15/19 1054 12/15/19 1106 12/15/19 1054   129/64 93 16 98.5 °F (36.9 °C) 96 %      MAP       --                 Physical Exam  Nursing Notes and Vital Signs Reviewed.  Constitutional: Patient is in no acute distress. Elderly.  Head: Atraumatic. Normocephalic.  Eyes: PERRL. EOM intact. Conjunctivae are not pale. No scleral icterus.  ENT: Mucous membranes are dry. Diffuse erythema to posterior pharynx. No exudates. Thrush on tongue.   Neck: Supple. Full ROM. No lymphadenopathy.  Cardiovascular:  Regular rate. Regular rhythm. No murmurs, rubs, or gallops. Distal pulses are 2+ and symmetric.  Pulmonary/Chest: No respiratory distress. Clear to auscultation bilaterally. No wheezing or rales. Airways patent.   Abdominal: Soft and non-distended.  There is no tenderness.  No rebound, guarding, or rigidity. Good bowel sounds.  Genitourinary: No CVA tenderness  Musculoskeletal: Moves all extremities. No obvious deformities. No edema. No calf tenderness.  Skin: Warm and dry.  Neurological: Patient is alert and oriented to person, place and time. Pupils ERRL and EOM normal. Cranial nerves II-XII are intact. Strength is full bilaterally; it is equal and 5/5 in bilateral upper and lower extremities. There is no pronator drift of outstretched arms. Light touch sense is intact. Voice is soft, but no slurred speech. No acute focal neurological deficits noted.  Psychiatric: Normal affect. Good eye contact. Appropriate in content.     ED Course   Procedures  ED Vital Signs:  Vitals:    12/15/19 1054 12/15/19 1106 12/15/19 1108 12/15/19 1109   BP: 129/64   (!) 146/67   Pulse: 93   94   Resp: 16      Temp:  98.5 °F (36.9 °C)     TempSrc:  Oral     SpO2: 96%   98%   Weight:   87.3 kg (192 lb 8 oz)     12/15/19 1350 12/15/19 1526 12/15/19 1641   BP: (!) 163/67 116/65 132/62   Pulse: 94 99 98   Resp:   18   Temp:  99 °F (37.2 °C) 98.7 °F (37.1 °C)   TempSrc:  Oral Oral   SpO2: 98% 98% 97%   Weight:          Abnormal Lab Results:  Labs Reviewed   CBC W/ AUTO DIFFERENTIAL - Abnormal; Notable for the following components:       Result Value    WBC 0.33 (*)     Mean Corpuscular Hemoglobin 32.3 (*)     RDW 14.7 (*)     Platelets 91 (*)     MPV 13.3 (*)     Gran% 7.0 (*)     Lymph% 89.0 (*)     Platelet Estimate Decreased (*)     All other components within normal limits    Narrative:     WBC critical result(s) called and verbal readback obtained from   Roseline Larson RN by MANNY 12/15/2019 11:44   COMPREHENSIVE METABOLIC PANEL -  Abnormal; Notable for the following components:    Potassium 3.0 (*)     Glucose 280 (*)     BUN, Bld 25 (*)     Albumin 3.4 (*)     Total Bilirubin 1.4 (*)     AST 88 (*)     ALT 86 (*)     All other components within normal limits   URINALYSIS, REFLEX TO URINE CULTURE - Abnormal; Notable for the following components:    Protein, UA Trace (*)     Glucose, UA Trace (*)     Ketones, UA 2+ (*)     Nitrite, UA Positive (*)     All other components within normal limits    Narrative:     Preferred Collection Type->Urine, Catheterized   URINALYSIS MICROSCOPIC - Abnormal; Notable for the following components:    Bacteria Many (*)     Wbc Casts, Ua 1 (*)     All other components within normal limits    Narrative:     Preferred Collection Type->Urine, Catheterized   POCT GLUCOSE - Abnormal; Notable for the following components:    POCT Glucose 266 (*)     All other components within normal limits   THROAT SCREEN, RAPID   CULTURE, URINE   CULTURE, STREP A,  THROAT   CULTURE, URINE   BETA - HYDROXYBUTYRATE, SERUM   PROCALCITONIN   POCT GLUCOSE MONITORING CONTINUOUS        All Lab Results:  Results for orders placed or performed during the hospital encounter of 12/15/19   Rapid strep screen   Result Value Ref Range    Rapid Strep A Screen Negative Negative   CBC auto differential   Result Value Ref Range    WBC 0.33 (LL) 3.90 - 12.70 K/uL    RBC 4.31 4.00 - 5.40 M/uL    Hemoglobin 13.9 12.0 - 16.0 g/dL    Hematocrit 41.9 37.0 - 48.5 %    Mean Corpuscular Volume 97 82 - 98 fL    Mean Corpuscular Hemoglobin 32.3 (H) 27.0 - 31.0 pg    Mean Corpuscular Hemoglobin Conc 33.2 32.0 - 36.0 g/dL    RDW 14.7 (H) 11.5 - 14.5 %    Platelets 91 (L) 150 - 350 K/uL    MPV 13.3 (H) 9.2 - 12.9 fL    Immature Granulocytes CANCELED 0.0 - 0.5 %    Immature Grans (Abs) CANCELED 0.00 - 0.04 K/uL    nRBC 0 0 /100 WBC    Gran% 7.0 (L) 38.0 - 73.0 %    Lymph% 89.0 (H) 18.0 - 48.0 %    Mono% 4.0 4.0 - 15.0 %    Eosinophil% 0.0 0.0 - 8.0 %    Basophil%  0.0 0.0 - 1.9 %    Platelet Estimate Decreased (A)     Tear Drop Cells Occasional     Stomatocytes Present     Differential Method Manual    Comprehensive metabolic panel   Result Value Ref Range    Sodium 137 136 - 145 mmol/L    Potassium 3.0 (L) 3.5 - 5.1 mmol/L    Chloride 96 95 - 110 mmol/L    CO2 29 23 - 29 mmol/L    Glucose 280 (H) 70 - 110 mg/dL    BUN, Bld 25 (H) 8 - 23 mg/dL    Creatinine 0.7 0.5 - 1.4 mg/dL    Calcium 9.6 8.7 - 10.5 mg/dL    Total Protein 7.3 6.0 - 8.4 g/dL    Albumin 3.4 (L) 3.5 - 5.2 g/dL    Total Bilirubin 1.4 (H) 0.1 - 1.0 mg/dL    Alkaline Phosphatase 55 55 - 135 U/L    AST 88 (H) 10 - 40 U/L    ALT 86 (H) 10 - 44 U/L    Anion Gap 12 8 - 16 mmol/L    eGFR if African American >60 >60 mL/min/1.73 m^2    eGFR if non African American >60 >60 mL/min/1.73 m^2   Beta - Hydroxybutyrate, Serum   Result Value Ref Range    Beta-Hydroxybutyrate 0.5 0.0 - 0.5 mmol/L   Urinalysis, Reflex to Urine Culture Urine, Catheterized   Result Value Ref Range    Specimen UA Urine, Catheterized     Color, UA Yellow Yellow, Straw, Lucina    Appearance, UA Clear Clear    pH, UA 6.0 5.0 - 8.0    Specific Gravity, UA 1.020 1.005 - 1.030    Protein, UA Trace (A) Negative    Glucose, UA Trace (A) Negative    Ketones, UA 2+ (A) Negative    Bilirubin (UA) Negative Negative    Occult Blood UA Negative Negative    Nitrite, UA Positive (A) Negative    Urobilinogen, UA Negative <2.0 EU/dL    Leukocytes, UA Negative Negative   Urinalysis Microscopic   Result Value Ref Range    RBC, UA 0 0 - 4 /hpf    WBC, UA 0 0 - 5 /hpf    Bacteria Many (A) None-Occ /hpf    Squam Epithel, UA 3 /hpf    Wbc Casts, Ua 1 (A) None /lpf    Microscopic Comment SEE COMMENT    POCT glucose   Result Value Ref Range    POCT Glucose 266 (H) 70 - 110 mg/dL         Imaging Results:  Imaging Results          X-Ray Chest AP Portable (Final result)  Result time 12/15/19 12:30:09    Final result by Nikolay Castañeda MD (12/15/19 12:30:09)                  Impression:      Chronic interstitial lung disease.  No acute findings.      Electronically signed by: Nikolay Castañeda MD  Date:    12/15/2019  Time:    12:30             Narrative:    EXAMINATION:  XR CHEST AP PORTABLE    CLINICAL HISTORY:  Weakness    TECHNIQUE:  AP view of the chest was performed.    COMPARISON:  11/18/2019    FINDINGS:  The cardiac and mediastinal silhouettes appear within normal limits. Chronic lower lobe interstitial opacities similar to prior study.  Status post CABG.  No acute osseous findings demonstrated.                                        The Emergency Provider reviewed the vital signs and test results, which are outlined above.     ED Discussion     12:59 PM: Discussed pt's case with Dr. Ervin (hem/onc) who agrees with plan to discharge and increase metformin. Pt should f/u with Dr. Ervin next week.    1:36 PM: Reassessed pt at this time. Daughter states that patient has appointment with Dr. Ervin scheduled for tomorrow at 1:00 PM. Discussed with pt all pertinent ED information and results. Discussed pt dx and plan of tx. Gave pt all f/u and return to the ED instructions. All questions and concerns were addressed at this time. Pt expresses understanding of information and instructions, and is comfortable with plan to discharge. Pt is stable for discharge.    I discussed with patient and daughter that evaluation in the ED does not suggest any emergent or life threatening medical conditions requiring immediate intervention beyond what was provided in the ED, and I believe patient is safe for discharge.  Regardless, an unremarkable evaluation in the ED does not preclude the development or presence of a serious of life threatening condition. As such, patient was instructed to return immediately for any worsening or change in current symptoms.          Medical Decision Making:   Clinical Tests:   Lab Tests: Ordered and Reviewed  Radiological Study: Ordered and Reviewed           ED  Medication(s):  Medications   sodium chloride 0.9% bolus 1,000 mL (0 mLs Intravenous Stopped 12/15/19 1350)   levoFLOXacin 750 mg/150 mL IVPB 750 mg (0 mg Intravenous Stopped 12/15/19 1526)       Current Discharge Medication List      START taking these medications    Details   levoFLOXacin (LEVAQUIN) 500 MG tablet Take 1 tablet (500 mg total) by mouth once daily. for 7 days  Qty: 7 tablet, Refills: 0      nystatin (MYCOSTATIN) 100,000 unit/mL suspension Take 5 mLs (500,000 Units total) by mouth 4 (four) times daily. for 10 days  Qty: 473 mL, Refills: 0             Follow-up Information     Joanne Ervin MD On 12/16/2019.    Specialty:  Hematology and Oncology  Why:  Return to the Emergency Room, If symptoms worsen  Contact information:  [address]  [place] [address]  [phone]                       Scribe Attestation:   Scribe #1: I performed the above scribed service and the documentation accurately describes the services I performed. I attest to the accuracy of the note.     Attending:   Physician Attestation Statement for Scribe #1: I, Alba Cueto MD, personally performed the services described in this documentation, as scribed by Gianna Fletcher, in my presence, and it is both accurate and complete.           Clinical Impression       ICD-10-CM ICD-9-CM   1. Neutropenia associated with mucositis D70.0 288.00     528.00   2. Weakness R53.1 780.79   3. Acute cystitis without hematuria N30.00 595.0   4. History of ovarian cancer Z85.43 V10.43   5. Type 2 diabetes mellitus with hyperglycemia, without long-term current use of insulin E11.65 250.00     790.29   6. Hypokalemia E87.6 276.8       Disposition:   Disposition: Placed in Observation  Condition: Stable         Alba Cueto MD  12/15/19 5079       Alba Cueto MD  12/15/19 8624

## 2019-12-15 NOTE — TELEPHONE ENCOUNTER
Reason for Disposition   Difficult to awaken or acting confused (e.g., disoriented, slurred speech)    Additional Information   Negative: Severe difficulty breathing (e.g., struggling for each breath, speaks in single words)   Negative: Shock suspected (e.g., cold/pale/clammy skin, too weak to stand, low BP, rapid pulse)    Protocols used: WEAKNESS (GENERALIZED) AND FATIGUE-A-    Patient daughter states patient woke up with new onset weakness, inability to stand, and slurred speech Per Triage protocol advised to call 911.

## 2019-12-15 NOTE — ED NOTES
Pt with generalized weakness upon transfer from stretcher to wheelchair, daughter voiced concerns about weakness and ability to safely care for patient at home, requesting admission to hospital.  Dr. Cueto notified.

## 2019-12-16 PROBLEM — K59.09 CHRONIC CONSTIPATION: Status: ACTIVE | Noted: 2019-01-01

## 2019-12-16 PROBLEM — K12.31 MUCOSITIS DUE TO ANTINEOPLASTIC THERAPY: Status: ACTIVE | Noted: 2019-01-01

## 2019-12-16 PROBLEM — Z51.5 ENCOUNTER FOR PALLIATIVE CARE: Status: ACTIVE | Noted: 2019-01-01

## 2019-12-16 PROBLEM — E44.0 MODERATE MALNUTRITION: Status: ACTIVE | Noted: 2019-01-01

## 2019-12-16 PROBLEM — R23.9 ALTERATION IN SKIN INTEGRITY: Status: ACTIVE | Noted: 2019-01-01

## 2019-12-16 PROBLEM — G89.3 NEOPLASM RELATED PAIN: Status: ACTIVE | Noted: 2019-01-01

## 2019-12-16 NOTE — HPI
80 y/o female with PMHx of DM, GERD, HLD, HTN, and ovarian Ca that presented to the ED with c/o sore throat that onset gradually last night. She is a chemo patient of Dr. Ervin.  Associated symptoms include generalized weakness and slurred speech. Pt was too weak to ambulate. Symptoms are constant and moderate inseverity. No mitigating or exacerbating factors reported. Pt denies fever, chills, N/V/D, abd pain, congestion, dizziness, and all other symptoms at his time.   She is a full code and her SDM is her daughter, Christina.  She also presented with acute slurred speech, and right sided facial drooping.    She will be kept on OBS for neutropenia with mucisutus under the care of Hospital     She received carboplatin/Taxol every three weeks - last treatment 12/9/19.

## 2019-12-16 NOTE — ASSESSMENT & PLAN NOTE
12/16/19 Related to recent chemotherapy.  WBC 0.27/ANC 0  --Continue magic mouth was 4 times daily for mucositis    --Granix 480 mcg subcutaneously daily  --CBC daily

## 2019-12-16 NOTE — HOSPITAL COURSE
Patient was kept on OBS for neutropenia associated with mucositis and UTI under the care of Hospital Medicine. She was given IV abx, magic mouthwash, and heme/onc was consulted. 12/16: speech is more slurred - discussed with Heme/onc - will get MRI to r/o stroke. Palliative care consulted for symptom management. Continue IV abx. And magic mouthwash. Failed obs, moved to IP 12/17: MRI showed no acute infarct or hemorrhage. ANC 0.0 receiving Granix daily X 3 days. Discussed with palliative care and heme/onc - will hold chemotherapy for pt to go to SNF and get her strength back. Case management working on placement. 12/17: Patient with edema and rales today - getting IV fluids and furosemide was held on admit. Will get cxr and give IV furosemide. Had discussion with palliative care - chemotherapy on hold until pt is stronger - PT recommending SNF. Case management working on placement. Had N/V overnight - zofran increased and pt resting comfortably now.   A positive

## 2019-12-16 NOTE — PT/OT/SLP EVAL
Occupational Therapy   Evaluation    Name: Jennifer Zhu  MRN: [id number]  Admitting Diagnosis:  Neutropenia associated with mucositis      Recommendations:     Discharge Recommendations: nursing facility, skilled  Discharge Equipment Recommendations:  none  Barriers to discharge:  None    Assessment:     Jennifer Zhu is a 79 y.o. female with a medical diagnosis of Neutropenia associated with mucositis.  She presents with debility and generalized weakness. Performance deficits affecting function: weakness, impaired self care skills, impaired balance, impaired endurance, impaired functional mobilty, decreased upper extremity function, gait instability, decreased lower extremity function.      Rehab Prognosis: Fair; patient would benefit from acute skilled OT services to address these deficits and reach maximum level of function.       Plan:     Patient to be seen 3 x/week to address the above listed problems via self-care/home management, therapeutic exercises, therapeutic activities  · Plan of Care Expires: 12/23/19  · Plan of Care Reviewed with: patient, daughter    Subjective     Chief Complaint: debility and generalized weakness  Patient/Family Comments/goals:     Occupational Profile:  Living Environment: lives with daughter in 1 story house with ramp to enter  Previous level of function: assistance with adl's and functional mobility with rw  Roles and Routines: occupational therapy  Equipment Used at Home:  bedside commode, walker, rolling, cane, straight, glucometer, shower chair, hospital bed(ramp to enter)  Assistance upon Discharge:     Pain/Comfort:  · Pain Rating 1: 0/10    Patients cultural, spiritual, Temple conflicts given the current situation:      Objective:     Communicated with: nurse Rebehak and epic chart review prior to session.  Patient found HOB elevated with   upon OT entry to room.    General Precautions: Standard, fall   Orthopedic Precautions:N/A   Braces: N/A     Occupational  Performance:    Bed Mobility:    · Patient completed Rolling/Turning to Left with  maximal assistance and 2 persons  · Patient completed Rolling/Turning to Right with maximal assistance and 2 persons  · Patient completed Scooting/Bridging with maximal assistance and 2 persons  · Patient completed Supine to Sit with maximal assistance and 2 persons  · Patient completed Sit to Supine with maximal assistance and 2 persons    Functional Mobility/Transfers:  Patient completed Sit <> Stand Transfer with maximal assistance, of 2 persons and x 3 trails  with  rolling walker    Activities of Daily Living:  · Upper Body Dressing: maximal assistance .       Cognitive/Visual Perceptual:  Cognitive/Psychosocial Skills:     -       Oriented to: Person   -       Follows Commands/attention:Easily distracted  -       Communication: clear/fluent  -       Memory: No Deficits noted  -       Safety awareness/insight to disability: impaired   Visual/Perceptual:      -. slightly impaired per daughter report    Physical Exam:  Upper Extremity Range of Motion:     -       Right Upper Extremity: aarom wfl  -       Left Upper Extremity: aarom wfl  Upper Extremity Strength:    -       Right Upper Extremity: mmt: 2 /5 grossly  -       Left Upper Extremity: mmt: 2/5 grossly    AMPAC 6 Click ADL:  AMPAC Total Score: 12    Treatment & Education:    Education:    Patient left HOB elevated with all lines intact, call button in reach, bed alarm on, nurse Rebehak notified and daughter present    GOALS:   Multidisciplinary Problems     Occupational Therapy Goals        Problem: Occupational Therapy Goal    Goal Priority Disciplines Outcome Interventions   Occupational Therapy Goal     OT, PT/OT     Description:  ot goals to be met by 12-23-19  Mod a with ue dressing  Pt will tolerate 1 set x 10 reps b ue rom exercise  Mod a with bsc t/f's                    History:     Past Medical History:   Diagnosis Date    Arthritis     Diabetes mellitus, type 2      GERD (gastroesophageal reflux disease)     Hyperlipidemia     Hypertension     Uterine prolapse        Past Surgical History:   Procedure Laterality Date    BLADDER SUSPENSION      CORONARY ARTERY BYPASS GRAFT      x3    INTRAOCULAR LENS INSERTION      left eye       Time Tracking:     OT Date of Treatment: 12/16/19  OT Start Time: 1020  OT Stop Time: 1045  OT Total Time (min): 25 min    Billable Minutes:Evaluation 10 minutes  Therapeutic Activity 15 minutes    Stacy Metcalf, OT  12/16/2019

## 2019-12-16 NOTE — NURSING
Pt is currently in hospital.  Received information from Dr. Ervin that pts treatment plan will be changing to weekly taxol carboplatin with next cycle.  I have notified the referral center of this change and also notified infusion nurses to change chair time for this patient with next treatment.    Oncology Navigation   Intake  MD Assigned: matthew  Initial Nurse Navigator Contact: 19  Contact Method: Phone  Other Contact Method: phone call from nurse  Date Worked: 19  Multiple appointments: Yes     Treatment  Treatment Type(s): Chemotherapy  Chemotherapy Regimen: TAxol carboplatin every 3 weeks       General Referrals: Social work;Other  Other Referral: home health// gyn/onc        Acuity  Systemic Treatment - predicted or initiated: Chemotherapy regimen with multiple drugs (+1)  Treatment Tolerability: 1  ECO  Comorbidities in Medical History: 2   Needed: 0  Support: 0  Transportation: 1  Psychological Factors (+1 each): QOL issues;Other (daughter reqeusts PCA for >32 hours / week)  Other Factors (+1 for Each): 3 (daughter expresses need for PCA>32 hrs / week / need for nurtrition ./ meals/ transportation)  Navigation Acuity: 12     Follow Up  No follow-ups on file.

## 2019-12-16 NOTE — PROGRESS NOTES
"  [facility] -   Adult Nutrition  Progress Note    SUMMARY     Recommendations    Recommendation: 1. Add dental soft consistency to current diet order (pt w/ painful swallowing). 2. Add optisource very high protein TID. 3. RD to f/u.   Goals: Meet > 85 % EEN/EPN by RD f.u   Nutrition Goal Status: new  Communication of RD Recs: (POc, sticky note)    Reason for Assessment    Reason For Assessment: identified at risk by screening criteria  Diagnosis: (Neutropenia, hx of ovarian CA )  Relevant Medical History: DM, HLD, HTN  General Information Comments: Pt currently on diabetic cardiac diet. Pt passed the ns dysphagia screen. Per pt's family, pt w/ painful swallowing & decreased appetite PTA (PO intake 50% x 1 week). Pt's family report wt loss, unsure of amount. Per epic records, pt weighed 203 lbs on 11/18/19, current kw=077 lbs ( wt loss of 4.4 % within the last month). Per [facility] 12/16/19, mild subcutaneous fat and muscle loss. PO intake today 25 %. RD offered ONS. Pt's family concern x high blood sugars. Will rec'd optisource supplements.   Nutrition Discharge Planning:  Cardiac diabetic diet     Nutrition Risk Screen    Nutrition Risk Screen: dysphagia or difficulty swallowing    Nutrition/Diet History    Spiritual, Cultural Beliefs, Anglican Practices, Values that Affect Care: no    Anthropometrics    Temp: 97.5 °F (36.4 °C)  Height Method: Stated  Height: 5' 5" (165.1 cm)  Height (inches): 65 in  Weight Method: Bed Scale  Weight: 88.2 kg (194 lb 6.4 oz)  Weight (lb): 194.4 lb  Ideal Body Weight (IBW), Female: 125 lb  % Ideal Body Weight, Female (lb): 155.52 lb  BMI (Calculated): 32.3  BMI Grade: 30 - 34.9- obesity - grade I       Lab/Procedures/Meds    Pertinent Labs Reviewed: reviewed  BMP  Lab Results   Component Value Date     12/16/2019    K 3.0 (L) 12/16/2019     12/16/2019    CO2 26 12/16/2019    BUN 18 12/16/2019    CREATININE 0.6 12/16/2019    CALCIUM 8.7 12/16/2019    ANIONGAP " 12 12/16/2019    ESTGFRAFRICA >60 12/16/2019    EGFRNONAA >60 12/16/2019     Lab Results   Component Value Date    CALCIUM 8.7 12/16/2019     Lab Results   Component Value Date    ALBUMIN 3.4 (L) 12/15/2019     Recent Labs   Lab 12/16/19  1057   POCTGLUCOSE 249*     Lab Results   Component Value Date    HGBA1C 6.7 (H) 12/15/2019       Pertinent Medications Reviewed: reviewed    Physical Findings/Assessment     skin: skin tear anterior greater trochanter     Estimated/Assessed Needs    Weight Used For Calorie Calculations: 88.2 kg (194 lb 7.1 oz)  Energy Calorie Requirements (kcal): 2293 - 2469  Energy Need Method: Kcal/kg  Protein Requirements: 106 g  Weight Used For Protein Calculations: 88.2 kg (194 lb 7.1 oz)     Estimated Fluid Requirement Method: RDA Method(or per mD)  RDA Method (mL): 2293         Nutrition Prescription Ordered    Current Diet Order: diabetic cardiac diet    Evaluation of Received Nutrient/Fluid Intake       % Intake of Estimated Energy Needs: 50 - 75 %  % Meal Intake: 50 - 75 %    Nutrition Risk      2xweekly     Assessment and Plan    Moderate malnutrition  Nutrition Problem:  (Moderate) Protein-Calorie Malnutrition  Malnutrition in the context of Chronic Illness/Injury    Related to (etiology):  Inadequate energy intake     Signs and Symptoms (as evidenced by):  Energy Intake: <75% of estimated energy requirement for 1 week  Body Fat Depletion: mild depletion of orbitals and triceps   Muscle Mass Depletion: mild depletion of temples, clavicle region and scapular region   Weight Loss: 4.4 % within the last month     Interventions(treatment strategy):  Collaboration with other providers    Nutrition Diagnosis Status:  New       Monitor and Evaluation    Food and Nutrient Intake: energy intake, food and beverage intake  Food and Nutrient Adminstration: diet order  Anthropometric Measurements: weight  Biochemical Data, Medical Tests and Procedures: electrolyte and renal panel, glucose/endocrine  profile  Nutrition-Focused Physical Findings: overall appearance     Malnutrition Assessment                 Orbital Region (Subcutaneous Fat Loss): mild depletion  Upper Arm Region (Subcutaneous Fat Loss): mild depletion   [place] Region (Muscle Loss): mild depletion  Clavicle Bone Region (Muscle Loss): mild depletion  Clavicle and Acromion Bone Region (Muscle Loss): mild depletion  Scapular Bone Region (Muscle Loss): mild depletion                 Nutrition Follow-Up    RD Follow-up?: Yes

## 2019-12-16 NOTE — ASSESSMENT & PLAN NOTE
Nutrition Problem:  (Moderate) Protein-Calorie Malnutrition  Malnutrition in the context of Chronic Illness/Injury    Related to (etiology):  Inadequate energy intake     Signs and Symptoms (as evidenced by):  Energy Intake: <75% of estimated energy requirement for 1 week  Body Fat Depletion: mild depletion of orbitals and triceps   Muscle Mass Depletion: mild depletion of temples, clavicle region and scapular region   Weight Loss: 4.4 % within the last month     Interventions(treatment strategy):  Collaboration with other providers    Nutrition Diagnosis Status:  New

## 2019-12-16 NOTE — PLAN OF CARE
Chart reviewed, pt resting in bed with call light and personal belongings within reach. Blood sugar monitored as ordered. Diabetic/cardiac diet, pt can only tolerate jello, pudding, and apple sauce due to oral mucositis. Wound care consult put in for pts skin tear to left hip area. Magic mouthwash used and pt obtained some relief of pain and tingling to mouth. Pt able to talk but very hard for her due mouth pain. Pt turned every 2 hours due to weakness, education given to help prevent skin breakdown. Vitals stable, pt absent of injury throughout shift, will continue to monitor.

## 2019-12-16 NOTE — PLAN OF CARE
Recommendations     Recommendation: 1. Add dental soft consistency to current diet order (pt w/ painful swallowing). 2. Add optisource very high protein TID. 3. RD to f/u.   Goals: Meet > 85 % EEN/EPN by RD f.u   Nutrition Goal Status: new  Communication of RD Recs: (POc, sticky note)

## 2019-12-16 NOTE — CONSULTS
"Consulted to this  78 y/o female for skin breakdown to abdominal fold. PMHx of DM, GERD, HLD, HTN, and ovarian Ca that presented to the ED with c/o sore throat. Currently admitted with neutropenia. Patient is awake and alert, her daughter is present at the bedside. Patient skin dry and flaky. She has skin breakdown under bilateral breasts and under left and right side of abdominal fold, consistent with friction. Applied critic aid paste and removed diaper. Patient then turned to right side with assistance. Present on admission Stage 2 pressure injury noted to her right buttock, small 1x1cm red wound bed noted, periwound with scar tissue. Daughter verified that she had an old pressure injury to that same site, stage unknown. Cleansed with bath wipe and patted dry. Critic aide paste applied. Recommend continued pressure injury prevention measures. See below for recommendations.    Stage 2 pressure injury to right buttock:  1. Cleanse wound with sterile normal saline or bath wipes  2. Pat dry  3. Apply thin layer of critic aide paste  4. Perform twice daily and with hygiene care    Skin breakdown to abdominal fold and under breasts  1. Cleanse wound with sterile normal saline or bath wipes  2. Pat dry  3. Apply thin layer of critic aide paste  4. Perform twice daily and with hygiene care    Skin Care Precautions / Pressure Injury Prevention:  1. Follow "Guidelines for Prevention of Pressure Ulcers in At Risk Patients"  These guidelines can be found on the [facility] Intranet by searching "Wound Care / Ostomy Resources"  2. Document wound assessment in [facility] using guidelines in Elsie's "Assessment : Wound" procedure  3. Limit the amount of linen/underpad between patient and mattress surface to ONE fitted sheet and ONE covidien underpad - NO DIAPERS  4. Obtain Bath Wipes for providing serafin care - avoid the use of wash cloths to areas affected by IAD.  5. Apply Moisture Barrier Paste to perineal / perirectal areas in a thin " even layer to clean dry skin BID and after each episode of pericare  6. Apply sween 24 moisturizer cream to all dry skin after daily bath and prn  7. Obtain foam wedge from materials management to assist with maintaining proper position changes at least q 2hours and document actual position in EPIC q 2hours  8. Elevate heels off mattress on 2 separate pillows placed lengthwise under each leg supporting the leg from knee to ankle.  Document in EPIC flow sheet every 2 hours.  9. .Do NOT elevate HOB greater than 30 degrees unless contraindicated.  10. Remove SCD/Plexi Pulses/BRIANA's every 12 hours for 30 minutes and assess skin underneath these devices for breakdown

## 2019-12-16 NOTE — ASSESSMENT & PLAN NOTE
12/16/19 Platelets currently 76 K.  No need for transfusion at this time.  Related to recent chemotherapy  --CBC daily  --Transfuse for platelets <10 or overt bleeding

## 2019-12-16 NOTE — SUBJECTIVE & OBJECTIVE
Oncology Treatment Plan:   OP GYN PACLITAXEL CARBOPLATIN (AUC) WEEKLY    Medications:  Continuous Infusions:   sodium chloride 0.9% 75 mL/hr at 12/16/19 1120     Scheduled Meds:   amLODIPine  5 mg Oral Daily    aspirin  325 mg Oral Daily    clopidogrel  75 mg Oral Daily    famotidine  20 mg Oral BID    fluticasone propionate  1 spray Each Nostril Daily    levoFLOXacin  750 mg Intravenous Q24H    loratadine  10 mg Oral Daily    metoprolol succinate  12.5 mg Oral BID    polyethylene glycol  17 g Oral Daily    potassium chloride 10%  20 mEq Oral TID    simvastatin  20 mg Oral QHS    tbo-filgrastim  480 mcg Subcutaneous Daily     PRN Meds:acetaminophen, acetaminophen, Dextrose 10% Bolus, Dextrose 10% Bolus, glucagon (human recombinant), glucose, glucose, insulin aspart U-100, magic mouthwash (diphenhydrAMINE 12.5 mg/5 mL 20 mL, aluminum & magnesium hydroxide-simethicone (MYLANTA) 20 mL, lidocaine HCl 2% (XYLOCAINE) 20 mL) solution, nitroGLYCERIN, ondansetron, oxyCODONE, sodium chloride 0.9%     Review of Systems   Constitutional: Positive for fatigue. Negative for activity change, appetite change, chills and fever.   HENT: Positive for sore throat. Negative for congestion, dental problem, ear pain, hearing loss, mouth sores, sinus pressure, tinnitus and trouble swallowing.    Eyes: Negative for pain, discharge, redness and visual disturbance.   Respiratory: Negative for apnea, cough, choking, chest tightness, shortness of breath and wheezing.    Cardiovascular: Negative for chest pain, palpitations and leg swelling.   Gastrointestinal: Negative for abdominal distention, abdominal pain, anal bleeding, blood in stool, constipation, diarrhea, nausea, rectal pain and vomiting.   Endocrine: Negative for cold intolerance, heat intolerance, polydipsia and polyuria.   Genitourinary: Negative for difficulty urinating, dysuria, flank pain, frequency, hematuria and urgency.   Musculoskeletal: Negative for arthralgias,  back pain, gait problem, joint swelling, myalgias, neck pain and neck stiffness.   Skin: Negative for color change, rash and wound.   Allergic/Immunologic: Negative for food allergies and immunocompromised state.   Neurological: Positive for facial asymmetry, speech difficulty and weakness. Negative for dizziness, tremors, seizures, syncope, light-headedness and headaches.   Hematological: Negative for adenopathy. Does not bruise/bleed easily.   Psychiatric/Behavioral: Positive for dysphoric mood. Negative for agitation, confusion and sleep disturbance. The patient is not nervous/anxious.    All other systems reviewed and are negative.    Objective:     Vital Signs (Most Recent):  Temp: 98 °F (36.7 °C) (12/16/19 1204)  Pulse: 90 (12/16/19 1204)  Resp: 18 (12/16/19 1204)  BP: (!) 111/55 (12/16/19 1204)  SpO2: 96 % (12/16/19 1204) Vital Signs (24h Range):  Temp:  [97.5 °F (36.4 °C)-99 °F (37.2 °C)] 98 °F (36.7 °C)  Pulse:  [] 90  Resp:  [15-20] 18  SpO2:  [95 %-98 %] 96 %  BP: (111-159)/(55-92) 111/55     Weight: 88.2 kg (194 lb 6.4 oz)  Body mass index is 32.35 kg/m².  Body surface area is 2.01 meters squared.      Intake/Output Summary (Last 24 hours) at 12/16/2019 1513  Last data filed at 12/16/2019 1100  Gross per 24 hour   Intake 270 ml   Output --   Net 270 ml       Physical Exam   Constitutional: She appears well-developed and well-nourished. She has a sickly appearance. She appears ill. No distress.   HENT:   Head: Normocephalic and atraumatic.   Nose: Nose normal.   Mouth/Throat: Oropharynx is clear and moist.   Eyes: Pupils are equal, round, and reactive to light. EOM are normal. Right eye exhibits no discharge. Left eye exhibits no discharge.   Neck: Normal range of motion. Neck supple.   Cardiovascular: Normal rate, regular rhythm, normal heart sounds and intact distal pulses. Exam reveals no gallop and no friction rub.   No murmur heard.  Pulmonary/Chest: Effort normal and breath sounds normal. No  respiratory distress. She has no wheezes. She has no rales. She exhibits no tenderness.   Abdominal: Soft. Bowel sounds are normal. She exhibits no distension.   Genitourinary:   Genitourinary Comments: U/a shows + nitrite, many bacteria   Musculoskeletal: Normal range of motion. She exhibits no edema, tenderness or deformity.   Neurological: She is alert. She displays tremor. A cranial nerve deficit is present. She exhibits abnormal muscle tone. Coordination abnormal.   Skin: Skin is warm and dry. Capillary refill takes less than 2 seconds. No rash noted. She is not diaphoretic. No erythema. No pallor.   Psychiatric: Her speech is delayed and slurred. Cognition and memory are impaired. She exhibits a depressed mood. She is attentive.   Nursing note and vitals reviewed.      Significant Labs:   CBC:   Recent Labs   Lab 12/15/19  1127 12/16/19  0523   WBC 0.33* 0.27*   HGB 13.9 11.7*   HCT 41.9 35.5*   PLT 91* 79*   , CMP:   Recent Labs   Lab 12/15/19  1127 12/16/19  0523    138   K 3.0* 3.0*   CL 96 100   CO2 29 26   * 212*   BUN 25* 18   CREATININE 0.7 0.6   CALCIUM 9.6 8.7   PROT 7.3  --    ALBUMIN 3.4*  --    BILITOT 1.4*  --    ALKPHOS 55  --    AST 88*  --    ALT 86*  --    ANIONGAP 12 12   EGFRNONAA >60 >60   , Coagulation: No results for input(s): PT, INR, APTT in the last 48 hours., LDH: No results for input(s): LDHCSF, BFSOURCE in the last 48 hours., LFTs:   Recent Labs   Lab 12/15/19  1127   ALT 86*   AST 88*   ALKPHOS 55   BILITOT 1.4*   PROT 7.3   ALBUMIN 3.4*   , Reticulocytes: No results for input(s): RETIC in the last 48 hours., Tumor Markers: No results for input(s): PSA, CEA, , AFPTM, [id number],  in the last 48 hours.    Invalid input(s): ALGTM, Urine Studies:   Recent Labs   Lab 12/15/19  1222   COLORU Yellow   APPEARANCEUA Clear   PHUR 6.0   SPECGRAV 1.020   PROTEINUA Trace*   GLUCUA Trace*   KETONESU 2+*   BILIRUBINUA Negative   OCCULTUA Negative   NITRITE Positive*    UROBILINOGEN Negative   LEUKOCYTESUR Negative   RBCUA 0   WBCUA 0   BACTERIA Many*   SQUAMEPITHEL 3    and All pertinent labs from the last 24 hours have been reviewed.    Diagnostic Results:  I have reviewed all pertinent imaging results/findings within the past 24 hours.

## 2019-12-16 NOTE — PROGRESS NOTES
[facility] Hospital Medicine  Progress Note    Patient Name: Jennifer Zhu  MRN: [id number]  Patient Class: IP- Inpatient   Admission Date: 12/15/2019  Length of Stay: 0 days  Attending Physician: Payam Almendarez MD  Primary Care Provider: Gómez Najera MD, MD        Subjective:     Principal Problem:Neutropenia associated with mucositis        HPI:  78 y/o female with PMHx of DM, GERD, HLD, HTN, and ovarian Ca that presented to the ED with c/o sore throat that onset gradually last night. She is a chemo patient of Dr. Ervin.  Associated symptoms include generalized weakness and slurred speech. Pt was too weak to ambulate. Symptoms are constant and moderate inseverity. No mitigating or exacerbating factors reported. Pt denies fever, chills, N/V/D, abd pain, congestion, dizziness, and all other symptoms at his time.   She is a full code and her SDM is her daughter, Christina.  She will be kept on OBS for neutropenia with mucisutus under the care of [facility].    Overview/Hospital Course:  Patient was kept on OBS for neutropenia associated with mucositis and UTI under the care of [facility]. She was given IV abx, magic mouthwash, and heme/onc was consulted.     Interval History: speech is more slurred - discussed with Heme/onc - will get MRI to r/o stroke. Palliative care consulted for symptom management. Continue IV abx. And magic mouthwash. Failed obs, moved to IP    Review of Systems   Constitutional: Positive for fatigue. Negative for activity change, appetite change, chills and fever.   HENT: Positive for sore throat. Negative for congestion, dental problem, ear pain, hearing loss, mouth sores, sinus pressure, tinnitus and trouble swallowing.    Eyes: Negative for pain, discharge, redness and visual disturbance.   Respiratory: Negative for apnea, cough, choking, chest tightness, shortness of breath and wheezing.    Cardiovascular: Negative for chest pain, palpitations and leg  swelling.   Gastrointestinal: Negative for abdominal distention, abdominal pain, anal bleeding, blood in stool, constipation, diarrhea, nausea, rectal pain and vomiting.   Endocrine: Negative for cold intolerance, heat intolerance, polydipsia and polyuria.   Genitourinary: Negative for difficulty urinating, dysuria, flank pain, frequency, hematuria and urgency.   Musculoskeletal: Negative for arthralgias, back pain, gait problem, joint swelling, myalgias, neck pain and neck stiffness.   Skin: Negative for color change, rash and wound.   Allergic/Immunologic: Negative for food allergies and immunocompromised state.   Neurological: Positive for weakness. Negative for dizziness, tremors, seizures, syncope, speech difficulty, light-headedness and headaches.   Hematological: Negative for adenopathy. Does not bruise/bleed easily.   Psychiatric/Behavioral: Negative for agitation, confusion and sleep disturbance. The patient is not nervous/anxious.    All other systems reviewed and are negative.    Objective:     Vital Signs (Most Recent):  Temp: 98.7 °F (37.1 °C) (12/16/19 1708)  Pulse: 88 (12/16/19 1708)  Resp: 18 (12/16/19 1708)  BP: 128/60 (12/16/19 1708)  SpO2: 96 % (12/16/19 1708) Vital Signs (24h Range):  Temp:  [97.5 °F (36.4 °C)-98.7 °F (37.1 °C)] 98.7 °F (37.1 °C)  Pulse:  [] 88  Resp:  [17-20] 18  SpO2:  [95 %-97 %] 96 %  BP: (111-159)/(55-92) 128/60     Weight: 88.2 kg (194 lb 6.4 oz)  Body mass index is 32.35 kg/m².    Intake/Output Summary (Last 24 hours) at 12/16/2019 1712  Last data filed at 12/16/2019 1100  Gross per 24 hour   Intake 120 ml   Output --   Net 120 ml      Physical Exam   Constitutional: She is oriented to person, place, and time. She appears well-developed and well-nourished. No distress.   HENT:   Head: Normocephalic and atraumatic.   Nose: Nose normal.   Mouth/Throat: Oropharynx is clear and moist.   Eyes: Pupils are equal, round, and reactive to light. EOM are normal. Right eye  exhibits no discharge. Left eye exhibits no discharge.   Neck: Normal range of motion. Neck supple. No JVD present. No tracheal deviation present. No thyromegaly present.   Cardiovascular: Normal rate, regular rhythm, normal heart sounds and intact distal pulses. Exam reveals no gallop and no friction rub.   No murmur heard.  Pulmonary/Chest: Effort normal and breath sounds normal. No respiratory distress. She has no wheezes. She has no rales. She exhibits no tenderness.   Abdominal: Soft. Bowel sounds are normal. She exhibits no distension and no mass. There is no tenderness.   Genitourinary:   Genitourinary Comments: U/a shows + nitrite, many bacteria   Musculoskeletal: Normal range of motion. She exhibits no edema, tenderness or deformity.   Neurological: She is alert and oriented to person, place, and time. No cranial nerve deficit. She exhibits normal muscle tone. Coordination normal.   Skin: Skin is warm and dry. Capillary refill takes less than 2 seconds. No rash noted. She is not diaphoretic. No erythema. No pallor.   Psychiatric: She has a normal mood and affect. Her behavior is normal.   Nursing note and vitals reviewed.      Significant Labs:   Recent Lab Results       12/16/19  1057   12/16/19  0523   12/16/19  0514   12/15/19  2136   12/15/19  1858        Anion Gap   12           Aniso   Slight           Baso #   0.00           Basophil%   0.0           BUN, Bld   18           Calcium   8.7           Chloride   100           CO2   26           Creatinine   0.6           Differential Method   Automated           eGFR if    >60           eGFR if non    >60  Comment:  Calculation used to obtain the estimated glomerular filtration  rate (eGFR) is the CKD-EPI equation.              Eos #   0.0           Eosinophil%   0.0           Estimated Avg Glucose         146     Glucose   212           Gran # (ANC)   0.0           Gran%   3.7           Hematocrit   35.5            Hemoglobin   11.7           Hemoglobin A1C External         6.7  Comment:  ADA Screening Guidelines:  5.7-6.4%  Consistent with prediabetes  >or=6.5%  Consistent with diabetes  High levels of fetal hemoglobin interfere with the HbA1C  assay. Heterozygous hemoglobin variants (HbS, HgC, etc)do  not significantly interfere with this assay.   However, presence of multiple variants may affect accuracy.       Immature Grans (Abs)   0.00  Comment:  Mild elevation in immature granulocytes is non specific and   can be seen in a variety of conditions including stress response,   acute inflammation, trauma and pregnancy. Correlation with other   laboratory and clinical findings is essential.             Immature Granulocytes   0.0           Lymph #   0.2           Lymph%   85.2           MCH   31.8           MCHC   33.0           MCV   97           Mono #   0.0           Mono%   11.1           MPV   13.9           nRBC   4           Platelet Estimate   Decreased           Platelets   79           POCT Glucose 249   204 240       Poik   Slight           Poly   Occasional           Potassium   3.0           RBC   3.68           RDW   14.9           Sodium   138           Tear Drop Cells   Occasional           WBC   0.27  Comment:  wbc critical result(s) called and verbal readback obtained from   luther ladd rn by AES 12/16/2019 06:14                                All pertinent labs within the past 24 hours have been reviewed.    Significant Imaging: I have reviewed all pertinent imaging results/findings within the past 24 hours.      Assessment/Plan:      * Neutropenia associated with mucositis  --Heme/onc consulted  --magic mouthwash  --palliative care consulted      Hypokalemia  --replete and monitor      Weakness  --PT/OT eval and tx  --MRI to r/o stroke      Thrombocytopenia  --platelets 91, likley 2/2 chemotherapy  --heme/onc consulted  --monitor and transfuse if falls below 10      Acute cystitis without  hematuria  --u/a with + nitrites, many bacteria  --IV abx.      Essential hypertension  --continue amlodipine, metoprolol      Diabetes mellitus  --accuchecks and SSI  --diabetic diet  --HA1C 6.7      Coronary artery disease involving native coronary artery without angina pectoris  --metoprolol, simvastatin  --cardiac diet        VTE Risk Mitigation (From admission, onward)         Ordered     IP VTE HIGH RISK PATIENT  Once      12/15/19 1843     Place sequential compression device  Until discontinued      12/15/19 1843                      CHAN Beck  Department of Hospital Medicine   [facility]

## 2019-12-16 NOTE — PLAN OF CARE
[facility]  Palliative Care   Psychosocial Assessment    Patient Name: Jennifer Zhu  MRN: [id number]  Admission Date: 12/15/2019  Hospital Length of Stay: 0 days  Code Status: Full Code   Attending Provider: Payam Almendarez MD  Palliative Care Provider: Noris Renner PA-C  Primary Care Physician: Gómez Najera MD, MD  Principal Problem:Neutropenia associated with mucositis    Reason for Referral: assistance with clarification of goals of care and psychosocial support  Consult Order Date: 12/19/19  Primary CM/SW: Madyson Felder    Present during Interview: patient and daughter (Savanah)    Primary Language:English   Needed: no      Past Medical Situation:   PMH:   Past Medical History:   Diagnosis Date    Arthritis     Diabetes mellitus, type 2     GERD (gastroesophageal reflux disease)     Hyperlipidemia     Hypertension     Uterine prolapse      Mental Health/Substance Use History: n/a  Non-traditional Health practices: n/a    Understanding of diagnosis and prognosis: Unable to speak to patient about this  Experience/Comfort level with health care system: 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 Savanah is not as comfortable as she is new to this role of care taking. She states prior to admit, she only had to take her mother to appts, because she was pretty independent. She is concerned about having little time to make a decision about which facility she would like patient to discharge to.     Patients Mental Status: n/a currently asleep    Socio-Economic Factors/Resources:  Address: [place] [address]  Phone Number: [phone] (home)     Marital Status:   Household Composition: Patient and Savanah  Children: Patient has 3 children.     Relationships with Family:   Savanah states she is the only child involved in patient's care. They are estranged from her two siblings  and most of the family due to their history of drug use. Savanah has lived with patient since about when her father  almost a year ago. 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     Patient does have multiple grandchildren and great grandchildren, but none are involved at this time. Savanah states when her father was sick, patient allowed family to visit and things went missing or were broken due to them being let in the home. Savanah inherited her father's share of the home, and since then she has not allowed any of those family members to return to visit. Savanah works with [employer] as a  for two captains, so she does not tolerate those family members and has set clear boundaries with them to protect herself and patient.     Emergency Contacts: Savanah Zhu, [phone]    Activities of Daily Living: Prior to hospitalization patient was reportedly independent with ADLs  Support Systems-Family & Community (Home Health, HME etc): Current with [facility]. Handicap accessible bathroom.    Transportation:  yes    Work/Education History: retired CNA   History: no    Financial Resources:Medicare and Medicaid    Advance Care Planning  & Legal Concerns:  Advanced Directives/Living Will: no, we did not discuss today due to Savanah being understandably overwhelmed. The topic was introduced by our PA and we will be available to continue ACP as appropriate.   Planning:  no    Healthcare Power of : yes - awaiting fax from their  with correct POA paperwork, because Savanah accidentally brought her own POA and not her mother's   Surrogate Decision Maker:            Spirituality, Culture & Coping Mechanisms:  F- Vidhya and Belief: Buddhist     I - Importance: yes    C - Community/Culture Values: attend Zoroastrianism when they can, but does not have a lot of support      A - Address in Care:        Strengths/Coping Strategies:  Self-Care Activities/Hobbies:    Goals/Hopes/Expectations: For patient to get a little stronger at SNF before going back home  Fears/Anxiety/Concerns: Savanah expressed concerns about care leading up to hospitalization. She called the 24hr nursing line and was not satisfied with the help received. She is wondering if she could have done anything better/different to prevent patient from having this decline.         Preferences about EOL Environment: (own bed, family nearby, pets, music, etc)      Complicated Bereavement Risk Assessment Tool (CBRAT)  Reference:  [facility] Clinical Practice Group (May 2016). Bereavement Risk Screening and Management Guidelines.  Retrieved from: http://www.[facility].com.au/wp-content/uploads//CZIDP-Pleeymjlfop-Xkypqvnco-and-Management-Guideline-2016.pdf      Bereaved Client Characteristics   ? Under 18      no  ? Was a Twin   no  ? Young Spouse   no  ? Elderly Spouse    no  ? Isolated    no  ? Lacks Meaningful Social Support   no  ? Dissatisfied with help available during illness   no  ? New to Financial [place] no  ? New to Decision-Making   no    Illness  ? Inherited Disorder   no  ? Stigmatized Disease in the family/community   no  ? Lengthy/Burdensome   no     Bereaved Client's History of Loss   ? Cumulative Multiple Losses   no  ? Previous Mental Health Illnesses   no  ? Current Mental Health Illness   no  ? Other Significant Health Issues   no   ? Migrant/Refugee   no Death  ? Sudden or Unexpected   no  ? Traumatic Circumstances Associated with Death   no  ? Significant Cultural/Social Burdens as a result of Death   no   Relationship with   ? Profound Lifelong Partner   no  ? Highly Dependent    no  ? Antagonistic   no  ? Ambivalent   no  ? Deeply Connected   no  ? Culturally Defined   no   Risk Factors Scores  0-2  Low  3-5  Moderate  5+  High  All persons scoring moderate to high  "presume to be at risk**    (** It is acknowledged that protective factors and resilience may outweigh apparent risk factors.      Total Risk Factors Score:   Moderate - Savanah does not have much family support and would benefit from continued f/u. She is still working full time with [employer]. She reports her bosses have been supportive of her need to take time off, and she has thought about taking FMLA. However, she is trying to avoid that, because she is "so close to [facility]." She has met with hem/onc social workers, and palliative care will also be available for support.       Discharge Planning Needs/Plan of Care: Madyson with case management met with Savanah to provide list of SNFs and referrals have been sent. Savanah is [facility]. Savanah is also working to get patient approved for LTC waiver through Medicaid so she can get some help taking care of patient while she is at work. She has been scheduled for palliative care f/u in the clinic.       Janee North, MSW, [facility]  [phone]  "

## 2019-12-16 NOTE — PLAN OF CARE
Fall precautions maintained. Pt free from injuries/fall.  Repositions with assist x2.  C/O of mouth dryness and soreness PRN mouthwash available.  Bed locked and low, side rails up x 2, phone and call light w/in reach.   POC and meds discussed, pt verbalized understanding.   Chart check done. Will cont to monitor.

## 2019-12-16 NOTE — PT/OT/SLP EVAL
Physical Therapy Evaluation    Patient Name:  Jennifer Zhu   MRN:  [id number]    Recommendations:     Discharge Recommendations:  nursing facility, skilled   Discharge Equipment Recommendations:     Barriers to discharge: Decreased caregiver support    Assessment:     Jennifer Zhu is a 79 y.o. female admitted with a medical diagnosis of Neutropenia associated with mucositis.  She presents with the following impairments/functional limitations:  weakness, gait instability, impaired balance, impaired endurance, impaired functional mobilty, decreased lower extremity function, decreased upper extremity function, decreased ROM, pain, impaired self care skills, impaired cognition, decreased safety awareness, impaired fine motor, edema .    Rehab Prognosis: Good; patient would benefit from acute skilled PT services to address these deficits and reach maximum level of function.    Recent Surgery: * No surgery found *      Plan:     During this hospitalization, patient to be seen   to address the identified rehab impairments via gait training, therapeutic activities, therapeutic exercises and progress toward the following goals:    · Plan of Care Expires:  12/23/19    Subjective     Chief Complaint: FATIGUE  Patient/Family Comments/goals: INC MOBILITY   Pain/Comfort:  · Pain Rating 1: (UNABLE TO GIVE PAIN NUMBER)    Patients cultural, spiritual, Yazidi conflicts given the current situation:      Living Environment:  PT LIVES AT HOME WITH DAUGHTER IN A ONE STORY HOME WITH A RAMP TO ENTER  Prior to admission, patients level of function was IND.  Equipment used at home:  .  DME owned (not currently used): rolling walker, bedside commode, shower chair and wheelchair.  Upon discharge, patient will have assistance from DAUGHTER.    Objective:     Communicated with NURSE AND Epic CHART REVIEW prior to session.  Patient found supine with peripheral IV  upon PT entry to room.    General Precautions: Standard, fall   Orthopedic  Precautions:N/A   Braces: N/A     Exams:  · RLE ROM: WFL  · RLE Strength: LIMITED  · LLE ROM: WFL  · LLE Strength: LIMITED    Functional Mobility:  PT MET IN RM SUP.SIT EOB WITH MAX A X 2. PT SCOOTED TO EOB AND STOOD WITH 3 TRIALS WITH MAX A X 2. PT UNABLE TO STAND ERECT. PT SUP IN BED WITH MAX A X 2. PT SCOOTED TO HOB WITH TOTAL A. PT LEFT WITH ALL NEEDS MET AND CALL BELL IN REACH.     AM-PAC 6 CLICK MOBILITY  Total Score:10     Patient left HOB elevated with call button in reach and bed alarm on.    GOALS:   Multidisciplinary Problems     Physical Therapy Goals        Problem: Physical Therapy Goal    Goal Priority Disciplines Outcome Goal Variances Interventions   Physical Therapy Goal     PT, PT/OT      Description:  PT WILL BE SEEN FOR P.T. FOR A MIN OF 5 OUT OF 7 DAYS A WEEK  LT19  1. PT WILL COMPLETE B LE TE X 10 REPS  2. PT WILL T/F TO CHAIR MAX A WITH RW  3. PT WILL GT TRAIN X10'MAX A WITH RW  4. PT WILL COMPLETE BED MOBILITY WITH MOD A                       History:     Past Medical History:   Diagnosis Date    Arthritis     Diabetes mellitus, type 2     GERD (gastroesophageal reflux disease)     Hyperlipidemia     Hypertension     Uterine prolapse        Past Surgical History:   Procedure Laterality Date    BLADDER SUSPENSION      CORONARY ARTERY BYPASS GRAFT      x3    INTRAOCULAR LENS INSERTION      left eye       Time Tracking:     PT Received On: 19  PT Start Time: 1020     PT Stop Time: 1045  PT Total Time (min): 25 min     Billable Minutes: Evaluation 15 and Therapeutic Activity 10      Taylor Iglesias, PT  2019

## 2019-12-16 NOTE — CONSULTS
[facility] -   Hematology/Oncology  Consult Note    Patient Name: Jennifer Zhu  MRN: [id number]  Admission Date: 12/15/2019  Hospital Length of Stay: 0 days  Code Status: Full Code   Attending Provider: Payam Almendarez MD  Consulting Provider: Shannan Erwin NP  Primary Care Physician: Gómez Najera MD, MD  Principal Problem:Neutropenia associated with mucositis    Consults  Subjective:     HPI:   80 y/o female with PMHx of DM, GERD, HLD, HTN, and ovarian Ca that presented to the ED with c/o sore throat that onset gradually last night. She is a chemo patient of Dr. Ervin.  Associated symptoms include generalized weakness and slurred speech. Pt was too weak to ambulate. Symptoms are constant and moderate inseverity. No mitigating or exacerbating factors reported. Pt denies fever, chills, N/V/D, abd pain, congestion, dizziness, and all other symptoms at his time.   She is a full code and her SDM is her daughter, Christina.  She also presented with acute slurred speech, and right sided facial drooping.    She will be kept on OBS for neutropenia with mucisutus under the care of Hospital     She received carboplatin/Taxol every three weeks - last treatment 12/9/19.      Oncology Treatment Plan:   OP GYN PACLITAXEL CARBOPLATIN (AUC) WEEKLY    Medications:  Continuous Infusions:   sodium chloride 0.9% 75 mL/hr at 12/16/19 1120     Scheduled Meds:   amLODIPine  5 mg Oral Daily    aspirin  325 mg Oral Daily    clopidogrel  75 mg Oral Daily    famotidine  20 mg Oral BID    fluticasone propionate  1 spray Each Nostril Daily    levoFLOXacin  750 mg Intravenous Q24H    loratadine  10 mg Oral Daily    metoprolol succinate  12.5 mg Oral BID    polyethylene glycol  17 g Oral Daily    potassium chloride 10%  20 mEq Oral TID    simvastatin  20 mg Oral QHS    tbo-filgrastim  480 mcg Subcutaneous Daily     PRN Meds:acetaminophen, acetaminophen, Dextrose 10% Bolus, Dextrose 10% Bolus, glucagon (human  recombinant), glucose, glucose, insulin aspart U-100, magic mouthwash (diphenhydrAMINE 12.5 mg/5 mL 20 mL, aluminum & magnesium hydroxide-simethicone (MYLANTA) 20 mL, lidocaine HCl 2% (XYLOCAINE) 20 mL) solution, nitroGLYCERIN, ondansetron, oxyCODONE, sodium chloride 0.9%     Review of Systems   Constitutional: Positive for fatigue. Negative for activity change, appetite change, chills and fever.   HENT: Positive for sore throat. Negative for congestion, dental problem, ear pain, hearing loss, mouth sores, sinus pressure, tinnitus and trouble swallowing.    Eyes: Negative for pain, discharge, redness and visual disturbance.   Respiratory: Negative for apnea, cough, choking, chest tightness, shortness of breath and wheezing.    Cardiovascular: Negative for chest pain, palpitations and leg swelling.   Gastrointestinal: Negative for abdominal distention, abdominal pain, anal bleeding, blood in stool, constipation, diarrhea, nausea, rectal pain and vomiting.   Endocrine: Negative for cold intolerance, heat intolerance, polydipsia and polyuria.   Genitourinary: Negative for difficulty urinating, dysuria, flank pain, frequency, hematuria and urgency.   Musculoskeletal: Negative for arthralgias, back pain, gait problem, joint swelling, myalgias, neck pain and neck stiffness.   Skin: Negative for color change, rash and wound.   Allergic/Immunologic: Negative for food allergies and immunocompromised state.   Neurological: Positive for facial asymmetry, speech difficulty and weakness. Negative for dizziness, tremors, seizures, syncope, light-headedness and headaches.   Hematological: Negative for adenopathy. Does not bruise/bleed easily.   Psychiatric/Behavioral: Positive for dysphoric mood. Negative for agitation, confusion and sleep disturbance. The patient is not nervous/anxious.    All other systems reviewed and are negative.    Objective:     Vital Signs (Most Recent):  Temp: 98 °F (36.7 °C) (12/16/19 1204)  Pulse: 90  (12/16/19 1204)  Resp: 18 (12/16/19 1204)  BP: (!) 111/55 (12/16/19 1204)  SpO2: 96 % (12/16/19 1204) Vital Signs (24h Range):  Temp:  [97.5 °F (36.4 °C)-99 °F (37.2 °C)] 98 °F (36.7 °C)  Pulse:  [] 90  Resp:  [15-20] 18  SpO2:  [95 %-98 %] 96 %  BP: (111-159)/(55-92) 111/55     Weight: 88.2 kg (194 lb 6.4 oz)  Body mass index is 32.35 kg/m².  Body surface area is 2.01 meters squared.      Intake/Output Summary (Last 24 hours) at 12/16/2019 1513  Last data filed at 12/16/2019 1100  Gross per 24 hour   Intake 270 ml   Output --   Net 270 ml       Physical Exam   Constitutional: She appears well-developed and well-nourished. She has a sickly appearance. She appears ill. No distress.   HENT:   Head: Normocephalic and atraumatic.   Nose: Nose normal.   Mouth/Throat: Oropharynx is clear and moist.   Eyes: Pupils are equal, round, and reactive to light. EOM are normal. Right eye exhibits no discharge. Left eye exhibits no discharge.   Neck: Normal range of motion. Neck supple.   Cardiovascular: Normal rate, regular rhythm, normal heart sounds and intact distal pulses. Exam reveals no gallop and no friction rub.   No murmur heard.  Pulmonary/Chest: Effort normal and breath sounds normal. No respiratory distress. She has no wheezes. She has no rales. She exhibits no tenderness.   Abdominal: Soft. Bowel sounds are normal. She exhibits no distension.   Genitourinary:   Genitourinary Comments: U/a shows + nitrite, many bacteria   Musculoskeletal: Normal range of motion. She exhibits no edema, tenderness or deformity.   Neurological: She is alert. She displays tremor. A cranial nerve deficit is present. She exhibits abnormal muscle tone. Coordination abnormal.   Skin: Skin is warm and dry. Capillary refill takes less than 2 seconds. No rash noted. She is not diaphoretic. No erythema. No pallor.   Psychiatric: Her speech is delayed and slurred. Cognition and memory are impaired. She exhibits a depressed mood. She is  attentive.   Nursing note and vitals reviewed.      Significant Labs:   CBC:   Recent Labs   Lab 12/15/19  1127 12/16/19  0523   WBC 0.33* 0.27*   HGB 13.9 11.7*   HCT 41.9 35.5*   PLT 91* 79*   , CMP:   Recent Labs   Lab 12/15/19  1127 12/16/19  0523    138   K 3.0* 3.0*   CL 96 100   CO2 29 26   * 212*   BUN 25* 18   CREATININE 0.7 0.6   CALCIUM 9.6 8.7   PROT 7.3  --    ALBUMIN 3.4*  --    BILITOT 1.4*  --    ALKPHOS 55  --    AST 88*  --    ALT 86*  --    ANIONGAP 12 12   EGFRNONAA >60 >60   , Coagulation: No results for input(s): PT, INR, APTT in the last 48 hours., LDH: No results for input(s): LDHCSF, BFSOURCE in the last 48 hours., LFTs:   Recent Labs   Lab 12/15/19  1127   ALT 86*   AST 88*   ALKPHOS 55   BILITOT 1.4*   PROT 7.3   ALBUMIN 3.4*   , Reticulocytes: No results for input(s): RETIC in the last 48 hours., Tumor Markers: No results for input(s): PSA, CEA, , AFPTM, [id number],  in the last 48 hours.    Invalid input(s): ALGTM, Urine Studies:   Recent Labs   Lab 12/15/19  1222   COLORU Yellow   APPEARANCEUA Clear   PHUR 6.0   SPECGRAV 1.020   PROTEINUA Trace*   GLUCUA Trace*   KETONESU 2+*   BILIRUBINUA Negative   OCCULTUA Negative   NITRITE Positive*   UROBILINOGEN Negative   LEUKOCYTESUR Negative   RBCUA 0   WBCUA 0   BACTERIA Many*   SQUAMEPITHEL 3    and All pertinent labs from the last 24 hours have been reviewed.    Diagnostic Results:  I have reviewed all pertinent imaging results/findings within the past 24 hours.    Assessment/Plan:     * Neutropenia associated with mucositis  12/16/19 Related to recent chemotherapy.  WBC 0.27/ANC 0  --Continue magic mouth was 4 times daily for mucositis    --Granix 480 mcg subcutaneously daily  --CBC daily    Thrombocytopenia  12/16/19 Platelets currently 76 K.  No need for transfusion at this time.  Related to recent chemotherapy  --CBC daily  --Transfuse for platelets <10 or overt bleeding    Acute cystitis without hematuria  12/16/19   Continue treatment of UTI with IV abx per .    Malignant neoplasm of ovary, left  12/16/19 Patient with  FIGO stage IIIC ovarian carcinoma receiving Carboplatin and paclitaxel, Q 3 weeks - last received 12/9/2019. Currently with neutropenia, mucositis, and UTI.  All chemotherapy will be on hold until resolution of acute illness.  Also presented to the ED with slurred speech as well as facial asymmetry.  ANC 0.0  --CBC, CMP daily  --IV abx per   --IV fluids  --MRI of brain with contrast - possible stroke vs metastasis  --Magic mouth 4 times daily for mucositis related to chemo  --Granix 480 mcg subcutaneously daily x 3 days    ?         Thank you for your consult. I will follow-up with patient. Please contact us if you have any additional questions.    Shannan Erwin NP  Hematology/Oncology  [facility]

## 2019-12-16 NOTE — PLAN OF CARE
SW met with patient and patient's daughter at bedside to assess for discharge planning.   Patient was alert and oriented and attempted to answer all of the questions, but could not due to a slur with speech that her daughter suspects was from a stroke, so the patient's daughter (who is also the power of ) answered most questions.  According the the patient's daughter, the patient was lost her ability to stand and walk, and suspect that it due to a possible stroke that the patient incurred.  Patient's daughter stated that the patient lives with her and that she is the patient's health at home and helps to manage her mother's care.  Patient's daughter requested that a referral be made for skilled nursing, stating that she cannot lift her mother alone, and will need her mother to go into a skilled nursing facility to get stronger before returning home.  Patient denied the need for any other community services.    Discharge plan is undetermined at this time.   SW provided a transitional care folder, information on advanced directives, information on pharmacy bedside delivery, and discharge planning begins on admission with contact information for any needs/questions.      D/C Plan:  Skilled Nursing Facility  PCP:  Dr. Gómez Najera  Preferred Pharmacy:  [facility] ([place], [place])  Discharge transportation:  Family         [facility]:  Active  Pharmacy Bedside Delivery:  Yes       12/16/19 1400   Discharge Assessment   Assessment Type Discharge Planning Assessment   Confirmed/corrected address and phone number on facesheet? Yes   Assessment information obtained from? Patient;Caregiver   Expected Length of Stay (days)   (TBD)   Communicated expected length of stay with patient/caregiver no   Prior to hospitilization cognitive status: Alert/Oriented   Prior to hospitalization functional status: Independent;Assistive Equipment;Needs Assistance   Current cognitive status: Alert/Oriented   Current Functional Status:  Independent;Assistive Equipment;Needs Assistance   Facility Arrived From: Home   Lives With child(elliott), adult   Able to Return to Prior Arrangements no   Is patient able to care for self after discharge? No   Who are your caregiver(s) and their phone number(s)? Savanah Zhu (Daughter [phone])   Patient's perception of discharge disposition skilled nursing facility   Readmission Within the Last 30 Days no previous admission in last 30 days   Patient currently being followed by outpatient case management? No   Patient currently receives any other outside agency services? Yes   Name and contact number of agency or person providing outside services [facility]   Is it the patient/care giver preference to resume care with the current outside agency? Yes   Do you have any problems affording any of your prescribed medications? No   Is the patient taking medications as prescribed? yes   Does the patient have transportation home? Yes   Transportation Anticipated family or friend will provide   Does the patient receive services at the [facility]? No   Discharge Plan A Skilled Nursing Facility   Discharge Plan B Home with family;Home Health   DME Needed Upon Discharge  none   Patient/Family in Agreement with Plan yes

## 2019-12-16 NOTE — HPI
78 y/o female with PMHx of DM, GERD, HLD, HTN, and ovarian Ca that presented to the ED with c/o sore throat that onset gradually last night. She is a chemo patient of Dr. Ervin.  Associated symptoms include generalized weakness and slurred speech. Pt was too weak to ambulate. Symptoms are constant and moderate inseverity. No mitigating or exacerbating factors reported. Pt denies fever, chills, N/V/D, abd pain, congestion, dizziness, and all other symptoms at his time.   She is a full code and her SDM is her daughter, Christina.  She will be kept on OBS for neutropenia with mucisutus under the care of Hospital Medicine.

## 2019-12-16 NOTE — CONSULTS
Advance Care Planning    Consult Note  Palliative Care      Consult Requested By: Claudia Wagner NP   Reason for Consult: Symptom management         SUBJECTIVE:     History of Present Illness:  Jennifer Zhu is a 79 y.o. year old with a history of stage III ovarian cancer, CAD with CABG in , chronic constipation, GERD, DM 2, HTN, and hyperlipidemia who presented to the emergency department complaining of generalized weakness and sore throat. She has completed one cycle of chemotherapy and presented with pancytopenia and mucositis. Palliative Care was consulted to assist with symptom management. We met Ms. Zhu with her daughter Savanah at bedside. The majority of the history was obtained by Savanah and Ms. Zhu deferred to her daughter for complex decisions such as code status. The advance directive uploaded into the system is for Savanah and not Ms. Zhu so she is trying to get a copy from the  so we can correct this in our system. Ms. Zhu confirms that Savanah is her surrogate for medical decisions. She reports her mouth pain is a little better overall and does not have an appetite. Savanah says that they filled oxycodone 5mg but she did not take a dose yet. She reports a dramatic change in her functional status following chemotherapy as she was previously very active and independent. She is nervous about her role as caregiver for her mother as it is very different than caring for her father who  a year ago from prostate cancer. She has no family support (intentionally estranged from her 2 older siblings) and has never been  nor children of her own. She is hopeful that chemotherapy will restore her mother's previous level of functioning and enable them to have more quality time together. We discussed adjusting her pain regimen to liquid solution since taking crushed pills was painful. Savanah will monitor for somnolence on this current dose and report back. Savanah reports her mother suffers  from chronic constipation and last BM was the day before admission. We then discussed code status options thoroughly including FULL and DNR/ DNI. In light of her advanced age and cancer diagnosis resuscitation rarely restores the previous level of functioning without longterm side effects or placement in a nursing facility. I recommended DNR/ DNI in her case and while I think Savanah agreed she was too emotional with this topic today and wished to continue discussion tomorrow. Savanah was open to Palliative Care  stopping by to provide support.        Past Medical History:   Diagnosis Date    Arthritis     Diabetes mellitus, type 2     GERD (gastroesophageal reflux disease)     Hyperlipidemia     Hypertension     Uterine prolapse      Past Surgical History:   Procedure Laterality Date    BLADDER SUSPENSION      CORONARY ARTERY BYPASS GRAFT      x3    INTRAOCULAR LENS INSERTION      left eye     History reviewed. No pertinent family history.  Review of patient's allergies indicates:   Allergen Reactions    Lisinopril Swelling     Lips swelling    Penicillins Anaphylaxis    Sulfa (sulfonamide antibiotics) Hives       Medications:    Current Facility-Administered Medications:     0.9%  NaCl infusion, , Intravenous, Continuous, Crissy Wagner FNP-C, Last Rate: 75 mL/hr at 12/16/19 1120    acetaminophen tablet 650 mg, 650 mg, Oral, Q4H PRN, Crissy Wagner, FNP-C    acetaminophen tablet 650 mg, 650 mg, Oral, Q6H PRN, Crissy Wagner, FNP-C    amLODIPine tablet 5 mg, 5 mg, Oral, Daily, Crissy Wagner, FNP-C, 5 mg at 12/16/19 0920    aspirin tablet 325 mg, 325 mg, Oral, Daily, Crissy Wagner, FNP-C, 325 mg at 12/16/19 0920    clopidogrel tablet 75 mg, 75 mg, Oral, Daily, Crissy Wagner, FNP-C, 75 mg at 12/16/19 0920    dextrose 10% (D10W) Bolus, 12.5 g, Intravenous, PRN, Payam Almendarez MD    dextrose 10% (D10W) Bolus, 25 g, Intravenous, PRN, Payam Almendarez MD     famotidine tablet 20 mg, 20 mg, Oral, BID, Crissy Wagner FNP-C, 20 mg at 12/16/19 0920    fluticasone propionate 50 mcg/actuation nasal spray 50 mcg, 1 spray, Each Nostril, Daily, ELLA Carolina-C, 50 mcg at 12/16/19 0920    glucagon (human recombinant) injection 1 mg, 1 mg, Intramuscular, PRN, CLINTON CarolinaP-C    glucose chewable tablet 16 g, 16 g, Oral, PRN, CLINTON CarolinaP-C    glucose chewable tablet 24 g, 24 g, Oral, PRN, ELLA Carolina-C    insulin aspart U-100 pen 0-5 Units, 0-5 Units, Subcutaneous, QID (AC + HS) PRN, ELLA Carolina-C, 2 Units at 12/16/19 1100    levoFLOXacin 750 mg/150 mL IVPB 750 mg, 750 mg, Intravenous, Q24H, ELLA Carolina-C, Last Rate: 100 mL/hr at 12/16/19 0931, 750 mg at 12/16/19 0931    loratadine tablet 10 mg, 10 mg, Oral, Daily, Shannan Erwin, BRAD, 10 mg at 12/16/19 1525    magic mouthwash (diphenhydrAMINE 12.5 mg/5 mL 20 mL, aluminum & magnesium hydroxide-simethicone (MYLANTA) 20 mL, lidocaine HCl 2% (XYLOCAINE) 20 mL) solution, 15 mL, Swish & Spit, Q4H PRN, ELLA Carolina-C, 15 mL at 12/16/19 1531    metoprolol succinate (TOPROL-XL) 24 hr split tablet 12.5 mg, 12.5 mg, Oral, BID, Benita Grossman, BRAD, 12.5 mg at 12/16/19 0920    nitroGLYCERIN SL tablet 0.4 mg, 0.4 mg, Sublingual, Q5 Min PRN, ELLA Carolina-C    ondansetron injection 4 mg, 4 mg, Intravenous, Q6H PRN, CHAN Carolina    oxyCODONE immediate release tablet 5 mg, 5 mg, Oral, Q4H PRN, CHAN Carolina    polyethylene glycol packet 17 g, 17 g, Oral, Daily, Payam Almendarez MD, 17 g at 12/16/19 0920    potassium chloride 10% oral solution 20 mEq, 20 mEq, Oral, TID, Payam Almendarez MD, 20 mEq at 12/16/19 1525    simvastatin tablet 20 mg, 20 mg, Oral, QHS, CHAN Carolina, 20 mg at 12/15/19 2131    sodium chloride 0.9% flush 10 mL, 10 mL, Intravenous, PRN, CHAN Carolina     tbo-filgrastim (GRANIX) injection 480 mcg/0.8 mL, 480 mcg, Subcutaneous, Daily, Shannan Erwin, BRAD, 480 mcg at 12/16/19 1320        OBJECTIVE:   Symptom Assessment (ESAS 0-10 scale)     ESAS 0 1 2 3 4 5 6 7 8 9 10   Pain  X            Dyspnea X             Anxiety X             Nausea X             Depression  X             Anorexia      X        Fatigue      X        Insomnia X             Restlessness  X             Agitation X             Constipation    yes  Bowel Management Plan (BMP): yes  Diarrhea        no  Comments: chronic constipation    Performance Status: PPS Score 40    ECOG Performance Status Grade: 3 - Confined to bed or chair 50% of waking hours    ROS:  Review of Systems   Constitutional: Positive for activity change, appetite change, fatigue and unexpected weight change. Negative for fever.   HENT: Positive for mouth sores, sore throat and trouble swallowing.    Respiratory: Negative for cough and shortness of breath.    Cardiovascular: Negative for chest pain.   Gastrointestinal: Positive for constipation. Negative for abdominal pain, nausea and vomiting.   Musculoskeletal: Negative for arthralgias and back pain.   Neurological: Positive for weakness. Negative for tremors and numbness.   Psychiatric/Behavioral: Positive for dysphoric mood. Negative for confusion and sleep disturbance.       Physical Exam:  Vitals: Temp: 98 °F (36.7 °C) (12/16/19 1204)  Pulse: 90 (12/16/19 1204)  Resp: 18 (12/16/19 1204)  BP: (!) 111/55 (12/16/19 1204)  SpO2: 96 % (12/16/19 1204)    Gen: well-developed, well-nourished, NAD, quiet, pensive  Head: atraumatic, normocephalic  Eyes: conjuctiva and sclera clear  Ears: hearing grossly intact with no external abnormality  Mouth: + mucositis, good dentition  Respiratory: CTAB, no wheezing or rhonchi  Heart: RRR  Abdomen: soft, NTTP, nondistended, hypoactive BS  Pulses: 1+ in DP  Extremities: no edema  Skin: no rashes or lesions  Psych: minimally cooperative, depressed  mood and affect    Labs:  CBC:   WBC   Date Value Ref Range Status   12/16/2019 0.27 (LL) 3.90 - 12.70 K/uL Final     Comment:     wbc critical result(s) called and verbal readback obtained from   luther ladd rn by AES 12/16/2019 06:14       Hemoglobin   Date Value Ref Range Status   12/16/2019 11.7 (L) 12.0 - 16.0 g/dL Final     Hematocrit   Date Value Ref Range Status   12/16/2019 35.5 (L) 37.0 - 48.5 % Final     Mean Corpuscular Volume   Date Value Ref Range Status   12/16/2019 97 82 - 98 fL Final     Platelets   Date Value Ref Range Status   12/16/2019 79 (L) 150 - 350 K/uL Final       BMP  Lab Results   Component Value Date     12/16/2019    K 3.0 (L) 12/16/2019     12/16/2019    CO2 26 12/16/2019    BUN 18 12/16/2019    CREATININE 0.6 12/16/2019    CALCIUM 8.7 12/16/2019    ANIONGAP 12 12/16/2019    ESTGFRAFRICA >60 12/16/2019    EGFRNONAA >60 12/16/2019       LFT:   Lab Results   Component Value Date    AST 88 (H) 12/15/2019    ALKPHOS 55 12/15/2019    BILITOT 1.4 (H) 12/15/2019       Albumin:   Albumin   Date Value Ref Range Status   12/15/2019 3.4 (L) 3.5 - 5.2 g/dL Final         Radiology:I have reviewed all pertinent imaging results/findings within the past 24 hours.      ASSESSMENT   Jennifer Zhu is a 79 y.o. year old with a history of ovarian cancer, GERD, DM 2, HTN, and hyperlipidemia who presented to the emergency department complaining of generalized weakness and sore throat. She has completed one cycle of chemotherapy and presented with pancytopenia and mucositis. Palliative Care was consulted to assist with symptom management.     PLAN   1. Mucositis  - Rotate to morphine solution 5mg PO q4hr PRN pain  - We will monitor usage and adjust as needed  - Continue topical treatments    2. Encounter for Palliative Care  - Code status: FULL- ongoing discussion  - Surrogate: daughter Savanah Zhu  - AD on file is for the wrong patient, we requested a copy and will scan it into the  computer    3. Stage III ovarian cancer  - Neoadjuvant chemotherapy planned with possible debulking surgery after    4. Chronic constipation  - She takes Linzess 290mg nightly at home so I will resume  - Continue daily Miralax    Thank you for allowing Palliative care to be involved in the care of Jennifer Zhu.         Medical decision making: HIGH based on high risk of death, untreated symptoms, high risk medications, poor prognosis, management of more than one chronic illness in exacerbation, managing side effects of medications or polypharmacy.     Plan required increased review of medication choice, interaction, dosing, frequency, and route due to patient complexity. Patient complexity increased by: At risk for delirium, Presence of advanced age, Patient is poor historian    > 50% of 75 min visit spent in chart review, face to face discussion of goals of care, symptom assessment, coordination of care and emotional support, formulating and communicating goals of care, exploring burden/ benefit of various approaches of treatment.       Noris Renner PA-C  Palliative Care

## 2019-12-16 NOTE — ASSESSMENT & PLAN NOTE
--platelets 91, likley 2/2 chemotherapy  --heme/onc consulted  --monitor and transfuse if falls below 10

## 2019-12-16 NOTE — SUBJECTIVE & OBJECTIVE
Interval History: speech is more slurred - discussed with Heme/onc - will get MRI to r/o stroke. Palliative care consulted for symptom management. Continue IV abx. And magic mouthwash. Failed obs, moved to IP    Review of Systems   Constitutional: Positive for fatigue. Negative for activity change, appetite change, chills and fever.   HENT: Positive for sore throat. Negative for congestion, dental problem, ear pain, hearing loss, mouth sores, sinus pressure, tinnitus and trouble swallowing.    Eyes: Negative for pain, discharge, redness and visual disturbance.   Respiratory: Negative for apnea, cough, choking, chest tightness, shortness of breath and wheezing.    Cardiovascular: Negative for chest pain, palpitations and leg swelling.   Gastrointestinal: Negative for abdominal distention, abdominal pain, anal bleeding, blood in stool, constipation, diarrhea, nausea, rectal pain and vomiting.   Endocrine: Negative for cold intolerance, heat intolerance, polydipsia and polyuria.   Genitourinary: Negative for difficulty urinating, dysuria, flank pain, frequency, hematuria and urgency.   Musculoskeletal: Negative for arthralgias, back pain, gait problem, joint swelling, myalgias, neck pain and neck stiffness.   Skin: Negative for color change, rash and wound.   Allergic/Immunologic: Negative for food allergies and immunocompromised state.   Neurological: Positive for weakness. Negative for dizziness, tremors, seizures, syncope, speech difficulty, light-headedness and headaches.   Hematological: Negative for adenopathy. Does not bruise/bleed easily.   Psychiatric/Behavioral: Negative for agitation, confusion and sleep disturbance. The patient is not nervous/anxious.    All other systems reviewed and are negative.    Objective:     Vital Signs (Most Recent):  Temp: 98.7 °F (37.1 °C) (12/16/19 1708)  Pulse: 88 (12/16/19 1708)  Resp: 18 (12/16/19 1708)  BP: 128/60 (12/16/19 1708)  SpO2: 96 % (12/16/19 1708) Vital Signs (24h  Range):  Temp:  [97.5 °F (36.4 °C)-98.7 °F (37.1 °C)] 98.7 °F (37.1 °C)  Pulse:  [] 88  Resp:  [17-20] 18  SpO2:  [95 %-97 %] 96 %  BP: (111-159)/(55-92) 128/60     Weight: 88.2 kg (194 lb 6.4 oz)  Body mass index is 32.35 kg/m².    Intake/Output Summary (Last 24 hours) at 12/16/2019 1712  Last data filed at 12/16/2019 1100  Gross per 24 hour   Intake 120 ml   Output --   Net 120 ml      Physical Exam   Constitutional: She is oriented to person, place, and time. She appears well-developed and well-nourished. No distress.   HENT:   Head: Normocephalic and atraumatic.   Nose: Nose normal.   Mouth/Throat: Oropharynx is clear and moist.   Eyes: Pupils are equal, round, and reactive to light. EOM are normal. Right eye exhibits no discharge. Left eye exhibits no discharge.   Neck: Normal range of motion. Neck supple. No JVD present. No tracheal deviation present. No thyromegaly present.   Cardiovascular: Normal rate, regular rhythm, normal heart sounds and intact distal pulses. Exam reveals no gallop and no friction rub.   No murmur heard.  Pulmonary/Chest: Effort normal and breath sounds normal. No respiratory distress. She has no wheezes. She has no rales. She exhibits no tenderness.   Abdominal: Soft. Bowel sounds are normal. She exhibits no distension and no mass. There is no tenderness.   Genitourinary:   Genitourinary Comments: U/a shows + nitrite, many bacteria   Musculoskeletal: Normal range of motion. She exhibits no edema, tenderness or deformity.   Neurological: She is alert and oriented to person, place, and time. No cranial nerve deficit. She exhibits normal muscle tone. Coordination normal.   Skin: Skin is warm and dry. Capillary refill takes less than 2 seconds. No rash noted. She is not diaphoretic. No erythema. No pallor.   Psychiatric: She has a normal mood and affect. Her behavior is normal.   Nursing note and vitals reviewed.      Significant Labs:   Recent Lab Results       12/16/19  1057    12/16/19  0523   12/16/19  0514   12/15/19  2136   12/15/19  1858        Anion Gap   12           Aniso   Slight           Baso #   0.00           Basophil%   0.0           BUN, Bld   18           Calcium   8.7           Chloride   100           CO2   26           Creatinine   0.6           Differential Method   Automated           eGFR if    >60           eGFR if non    >60  Comment:  Calculation used to obtain the estimated glomerular filtration  rate (eGFR) is the CKD-EPI equation.              Eos #   0.0           Eosinophil%   0.0           Estimated Avg Glucose         146     Glucose   212           Gran # (ANC)   0.0           Gran%   3.7           Hematocrit   35.5           Hemoglobin   11.7           Hemoglobin A1C External         6.7  Comment:  ADA Screening Guidelines:  5.7-6.4%  Consistent with prediabetes  >or=6.5%  Consistent with diabetes  High levels of fetal hemoglobin interfere with the HbA1C  assay. Heterozygous hemoglobin variants (HbS, HgC, etc)do  not significantly interfere with this assay.   However, presence of multiple variants may affect accuracy.       Immature Grans (Abs)   0.00  Comment:  Mild elevation in immature granulocytes is non specific and   can be seen in a variety of conditions including stress response,   acute inflammation, trauma and pregnancy. Correlation with other   laboratory and clinical findings is essential.             Immature Granulocytes   0.0           Lymph #   0.2           Lymph%   85.2           MCH   31.8           MCHC   33.0           MCV   97           Mono #   0.0           Mono%   11.1           MPV   13.9           nRBC   4           Platelet Estimate   Decreased           Platelets   79           POCT Glucose 249   204 240       Poik   Slight           Poly   Occasional           Potassium   3.0           RBC   3.68           RDW   14.9           Sodium   138           Tear Drop Cells   Occasional           WBC    0.27  Comment:  wbc critical result(s) called and verbal readback obtained from   luther ladd rn by BENNY 12/16/2019 06:14                                All pertinent labs within the past 24 hours have been reviewed.    Significant Imaging: I have reviewed all pertinent imaging results/findings within the past 24 hours.

## 2019-12-16 NOTE — H&P
[facility] Hospital Medicine  History & Physical    Patient Name: Jennifer Zhu  MRN: [id number]  Admission Date: 12/15/2019  Attending Physician: Payam Almendarez MD   Primary Care Provider: Gómez Najera MD, MD         Patient information was obtained from patient, past medical records and ER records.     Subjective:     Principal Problem:Neutropenia associated with mucositis    Chief Complaint:   Chief Complaint   Patient presents with    dry throat     pt complains of dry throat        HPI: 80 y/o female with PMHx of DM, GERD, HLD, HTN, and ovarian Ca that presented to the ED with c/o sore throat that onset gradually last night. She is a chemo patient of Dr. Ervin.  Associated symptoms include generalized weakness and slurred speech. Pt was too weak to ambulate. Symptoms are constant and moderate inseverity. No mitigating or exacerbating factors reported. Pt denies fever, chills, N/V/D, abd pain, congestion, dizziness, and all other symptoms at his time.   She is a full code and her SDM is her daughter, Christina.  She will be kept on OBS for neutropenia with mucisutus under the care of Hospital Medicine.      Past Medical History:   Diagnosis Date    Arthritis     Diabetes mellitus, type 2     GERD (gastroesophageal reflux disease)     Hyperlipidemia     Hypertension     Uterine prolapse        Past Surgical History:   Procedure Laterality Date    BLADDER SUSPENSION      CORONARY ARTERY BYPASS GRAFT      x3    INTRAOCULAR LENS INSERTION      left eye       Review of patient's allergies indicates:   Allergen Reactions    Lisinopril Swelling     Lips swelling    Penicillins Anaphylaxis    Sulfa (sulfonamide antibiotics) Hives       No current facility-administered medications on file prior to encounter.      Current Outpatient Medications on File Prior to Encounter   Medication Sig    amlodipine (NORVASC) 5 MG tablet Take 5 mg by mouth.    aspirin 325 MG tablet Take 325 mg by  mouth.    blood sugar diagnostic (ONETOUCH ULTRA TEST) Strp 1 boxes by Misc.(Non-Drug; Combo Route) route daily.    clopidogrel (PLAVIX) 75 mg tablet Take 75 mg by mouth.    fluticasone (FLONASE) 50 mcg/actuation nasal spray SHAKE WELL USE 1 SQUIRT IN EACH NOSTRIL ONCE DAILY FOR NASAL ALLERGY SYMPTOMS    furosemide (LASIX) 20 MG tablet Take 1-2 tablet by mouth  per day for fluid and blood pressure    lancets (ONETOUCH ULTRASOFT LANCETS) Misc Check BS bid    linaclotide (LINZESS) 145 mcg Cap capsule Take 90 mcg by mouth.     loratadine (CLARITIN) 10 mg tablet Take 10 mg by mouth.    metoprolol succinate (TOPROL-XL) 25 MG 24 hr tablet Take 1/2 tab po Bid.    ondansetron (ZOFRAN) 8 MG tablet Take 1 tablet (8 mg total) by mouth every 12 (twelve) hours as needed for Nausea.    peg 400-propylene glycol, PF, 0.4-0.3 % Dpet Apply 1 drop to eye.    potassium chloride (KLOR-CON) 10 MEQ TbSR TAKE ONE (1) TABLET BY MOUTH TWICE A DAY WITH A FULL GLASS OF WATER FOR POTASSIUM    propylene glycol/peg 400 (SYSTANE ULTRA OPHT) Apply 1 drop to eye every 2 (two) hours.    ranitidine (ZANTAC) 150 MG tablet Take 150 mg by mouth 2 (two) times daily.    simvastatin (ZOCOR) 20 MG tablet Take 20 mg by mouth.    travoprost (TRAVATAN Z) 0.004 % Drop     [DISCONTINUED] metFORMIN (GLUCOPHAGE) 500 MG tablet TAKE 1 TABLET BY MOUTH TWICE DAILY AS DIRECTED FOR SUGAR DIABETES TAKE WITH FOOD    diclofenac sodium (VOLTAREN) 1 % Gel Apply 2 g topically once daily.    ESTRACE 0.01 % (0.1 mg/gram) vaginal cream USE AS DIRECTED VAGINALLY ONE TO TWO TIMES PER WEEK AS DIRECTED    nitroGLYCERIN (NITROSTAT) 0.4 MG SL tablet Place 0.4 mg under the tongue.    ondansetron (ZOFRAN-ODT) 8 MG TbDL Take 1 tablet (8 mg total) by mouth every 12 (twelve) hours as needed.    oxyCODONE (OXY-IR) 5 mg Cap Take 1 capsule (5 mg total) by mouth every 6 (six) hours as needed for Pain.    polyethylene glycol (GLYCOLAX) 17 gram/dose powder Take 17 g by mouth  once daily.    triamcinolone acetonide 0.1% (KENALOG) 0.1 % ointment Apply 1 applicator topically.     Family History     Reviewed and not pertinent        Tobacco Use    Smoking status: Never Smoker    Smokeless tobacco: Never Used   Substance and Sexual Activity    Alcohol use: No    Drug use: No    Sexual activity: Not Currently     Review of Systems   Constitutional: Positive for fatigue. Negative for activity change, appetite change, chills and fever.   HENT: Positive for sore throat. Negative for congestion, dental problem, ear pain, hearing loss, mouth sores, sinus pressure, tinnitus and trouble swallowing.    Eyes: Negative for pain, discharge, redness and visual disturbance.   Respiratory: Negative for apnea, cough, choking, chest tightness, shortness of breath and wheezing.    Cardiovascular: Negative for chest pain, palpitations and leg swelling.   Gastrointestinal: Negative for abdominal distention, abdominal pain, anal bleeding, blood in stool, constipation, diarrhea, nausea, rectal pain and vomiting.   Endocrine: Negative for cold intolerance, heat intolerance, polydipsia and polyuria.   Genitourinary: Negative for difficulty urinating, dysuria, flank pain, frequency, hematuria and urgency.   Musculoskeletal: Negative for arthralgias, back pain, gait problem, joint swelling, myalgias, neck pain and neck stiffness.   Skin: Negative for color change, rash and wound.   Allergic/Immunologic: Negative for food allergies and immunocompromised state.   Neurological: Positive for weakness. Negative for dizziness, tremors, seizures, syncope, speech difficulty, light-headedness and headaches.   Hematological: Negative for adenopathy. Does not bruise/bleed easily.   Psychiatric/Behavioral: Negative for agitation, confusion and sleep disturbance. The patient is not nervous/anxious.    All other systems reviewed and are negative.    Objective:     Vital Signs (Most Recent):  Temp: 98.6 °F (37 °C) (12/15/19  1706)  Pulse: 98 (12/15/19 1706)  Resp: 15 (12/15/19 1706)  BP: (!) 141/61 (12/15/19 1706)  SpO2: 96 % (12/15/19 1706) Vital Signs (24h Range):  Temp:  [98.5 °F (36.9 °C)-99 °F (37.2 °C)] 98.6 °F (37 °C)  Pulse:  [93-99] 98  Resp:  [15-18] 15  SpO2:  [96 %-98 %] 96 %  BP: (116-163)/(61-67) 141/61     Weight: 87.3 kg (192 lb 8 oz)  Body mass index is 31.07 kg/m².    Physical Exam   Constitutional: She is oriented to person, place, and time. She appears well-developed and well-nourished. No distress.   HENT:   Head: Normocephalic and atraumatic.   Nose: Nose normal.   Mouth/Throat: Oropharynx is clear and moist.   Eyes: Pupils are equal, round, and reactive to light. EOM are normal. Right eye exhibits no discharge. Left eye exhibits no discharge.   Neck: Normal range of motion. Neck supple. No JVD present. No tracheal deviation present. No thyromegaly present.   Cardiovascular: Normal rate, regular rhythm, normal heart sounds and intact distal pulses. Exam reveals no gallop and no friction rub.   No murmur heard.  Pulmonary/Chest: Effort normal and breath sounds normal. No respiratory distress. She has no wheezes. She has no rales. She exhibits no tenderness.   Abdominal: Soft. Bowel sounds are normal. She exhibits no distension and no mass. There is no tenderness.   Genitourinary:   Genitourinary Comments: U/a shows + nitrite, many bacteria   Musculoskeletal: Normal range of motion. She exhibits no edema, tenderness or deformity.   Neurological: She is alert and oriented to person, place, and time. No cranial nerve deficit. She exhibits normal muscle tone. Coordination normal.   Skin: Skin is warm and dry. Capillary refill takes less than 2 seconds. No rash noted. She is not diaphoretic. No erythema. No pallor.   Psychiatric: She has a normal mood and affect. Her behavior is normal.   Nursing note and vitals reviewed.        CRANIAL NERVES     CN III, IV, VI   Pupils are equal, round, and reactive to  light.  Extraocular motions are normal.        Significant Labs:   Recent Lab Results       12/15/19  1222   12/15/19  1127   12/15/19  1109        Albumin   3.4       Alkaline Phosphatase   55       ALT   86       Anion Gap   12       Appearance, UA Clear         AST   88       Bacteria, UA Many         Basophil%   0.0       Beta-Hydroxybutyrate   0.5       Bilirubin (UA) Negative         BILIRUBIN TOTAL   1.4  Comment:  For infants and newborns, interpretation of results should be based  on gestational age, weight and in agreement with clinical  observations.  Premature Infant recommended reference ranges:  Up to 24 hours.............<8.0 mg/dL  Up to 48 hours............<12.0 mg/dL  3-5 days..................<15.0 mg/dL  6-29 days.................<15.0 mg/dL         BUN, Bld   25       Calcium   9.6       Chloride   96       CO2   29       Color, UA Yellow         Creatinine   0.7       Differential Method   Manual       eGFR if    >60       eGFR if non    >60  Comment:  Calculation used to obtain the estimated glomerular filtration  rate (eGFR) is the CKD-EPI equation.          Eosinophil%   0.0       Glucose   280       Glucose, UA Trace         Gran%   7.0       Hematocrit   41.9       Hemoglobin   13.9       Immature Grans (Abs)   CANCELED  Comment:  Mild elevation in immature granulocytes is non specific and   can be seen in a variety of conditions including stress response,   acute inflammation, trauma and pregnancy. Correlation with other   laboratory and clinical findings is essential.    Result canceled by the ancillary.         Immature Granulocytes   CANCELED  Comment:  Result canceled by the ancillary.       Ketones, UA 2+         Leukocytes, UA Negative         Lymph%   89.0       MCH   32.3       MCHC   33.2       MCV   97       Microscopic Comment SEE COMMENT  Comment:  Other formed elements not mentioned in the report are not   present in the microscopic  examination.            Mono%   4.0       MPV   13.3       NITRITE UA Positive         nRBC   0       Occult Blood UA Negative         pH, UA 6.0         Platelet Estimate   Decreased       Platelets   91  Comment:  Results confirmed, test repeated       POCT Glucose     266     Potassium   3.0       PROTEIN TOTAL   7.3       Protein, UA Trace  Comment:  Recommend a 24 hour urine protein or a urine   protein/creatinine ratio if globulin induced proteinuria is  clinically suspected.           RAPID STREP A SCREEN   Negative  Comment:  See Micro for reflexed Strep culture.       RBC   4.31       RBC, UA 0         RDW   14.7       Sodium   137       Specific Gravity, UA 1.020         Specimen UA Urine, Catheterized         Squam Epithel, UA 3         Stomatocytes   Present       Tear Drop Cells   Occasional       UROBILINOGEN UA Negative         WBC Casts, UA 1         WBC, UA 0         WBC   0.33  Comment:  WBC critical result(s) called and verbal readback obtained from   Roseline Larson RN by [email or web address] 12/15/2019 11:44             All pertinent labs within the past 24 hours have been reviewed.    Significant Imaging: I have reviewed all pertinent imaging results/findings within the past 24 hours.    Assessment/Plan:     * Neutropenia associated with mucositis  --Heme/onc consulted  --magic mouthwash  --palliative care consulted      Hypokalemia  --replete and monitor      Weakness  --PT/OT eval and tx      Thrombocytopenia  --platelets 91, likley 2/2 chemotherapy  --heme/onc consulted  --monitor and transfuse if falls below 10      Acute cystitis without hematuria  --u/a with + nitrites, many bacteria  --IV abx.      Essential hypertension  --continue amlodipine, metoprolol      Diabetes mellitus  --accuchecks and SSI  --diabetic diet      Coronary artery disease involving native coronary artery without angina pectoris  --metoprolol, simvastatin  --cardiac diet      VTE Risk Mitigation (From admission, onward)          Ordered     IP VTE HIGH RISK PATIENT  Once      12/15/19 1843     Place sequential compression device  Until discontinued      12/15/19 1843                   CLINTON BeckP-C  Department of Hospital Medicine   [facility]

## 2019-12-16 NOTE — PLAN OF CARE
SW provided a choice form and laminated copy of skilled nursing providers.  SW faxed a blanket referral because patient's daughter (with power of ) could not decide on the facility.         12/16/19 1170   Post-Acute Status   Post-Acute Authorization Placement   Post-Acute Placement Status Referrals Sent

## 2019-12-16 NOTE — ASSESSMENT & PLAN NOTE
12/16/19 Patient with  FIGO stage IIIC ovarian carcinoma receiving Carboplatin and paclitaxel, Q 3 weeks - last received 12/9/2019. Currently with neutropenia, mucositis, and UTI.  All chemotherapy will be on hold until resolution of acute illness.  Also presented to the ED with slurred speech as well as facial asymmetry.  ANC 0.0  --CBC, CMP daily  --IV abx per HM  --IV fluids  --MRI of brain with contrast - possible stroke vs metastasis  --Magic mouth 4 times daily for mucositis related to chemo  --Granix 480 mcg subcutaneously daily x 3 days    ?

## 2019-12-16 NOTE — PLAN OF CARE
SW met with patient and patient's daughter at bedside to assess for discharge planning.   Patient was alert and oriented and attempted to answer all of the questions, but could not due to a slur with speech that her daughter suspects was from a stroke, so the patient's daughter (who is also the power of ) answered most questions.  According the the patient's daughter, the patient was lost her ability to stand and walk, and suspect that it due to a possible stroke that the patient incurred.  Patient's daughter stated that the patient lives with her and that she is the patient's health at home and helps to manage her mother's care.  Patient's daughter requested that a referral be made for skilled nursing, stating that she cannot lift her mother alone, and will need her mother to go into a skilled nursing facility to get stronger before returning home.  Patient denied the need for any other community services.    Discharge plan is undetermined at this time.   SW provided a transitional care folder, information on advanced directives, information on pharmacy bedside delivery, and discharge planning begins on admission with contact information for any needs/questions.     D/C Plan:  Skilled Nursing Facility  PCP:  Dr. Gómez Najera  Preferred Pharmacy:  [facility] ([place], [place])  Discharge transportation:  Family   [facility]:  Active  Pharmacy Bedside Delivery:  Yes

## 2019-12-16 NOTE — SUBJECTIVE & OBJECTIVE
Past Medical History:   Diagnosis Date    Arthritis     Diabetes mellitus, type 2     GERD (gastroesophageal reflux disease)     Hyperlipidemia     Hypertension     Uterine prolapse        Past Surgical History:   Procedure Laterality Date    BLADDER SUSPENSION      CORONARY ARTERY BYPASS GRAFT      x3    INTRAOCULAR LENS INSERTION      left eye       Review of patient's allergies indicates:   Allergen Reactions    Lisinopril Swelling     Lips swelling    Penicillins Anaphylaxis    Sulfa (sulfonamide antibiotics) Hives       No current facility-administered medications on file prior to encounter.      Current Outpatient Medications on File Prior to Encounter   Medication Sig    amlodipine (NORVASC) 5 MG tablet Take 5 mg by mouth.    aspirin 325 MG tablet Take 325 mg by mouth.    blood sugar diagnostic (ONETOUCH ULTRA TEST) Strp 1 boxes by Misc.(Non-Drug; Combo Route) route daily.    clopidogrel (PLAVIX) 75 mg tablet Take 75 mg by mouth.    fluticasone (FLONASE) 50 mcg/actuation nasal spray SHAKE WELL USE 1 SQUIRT IN EACH NOSTRIL ONCE DAILY FOR NASAL ALLERGY SYMPTOMS    furosemide (LASIX) 20 MG tablet Take 1-2 tablet by mouth  per day for fluid and blood pressure    lancets (ONETOUCH ULTRASOFT LANCETS) Misc Check BS bid    linaclotide (LINZESS) 145 mcg Cap capsule Take 90 mcg by mouth.     loratadine (CLARITIN) 10 mg tablet Take 10 mg by mouth.    metoprolol succinate (TOPROL-XL) 25 MG 24 hr tablet Take 1/2 tab po Bid.    ondansetron (ZOFRAN) 8 MG tablet Take 1 tablet (8 mg total) by mouth every 12 (twelve) hours as needed for Nausea.    peg 400-propylene glycol, PF, 0.4-0.3 % Dpet Apply 1 drop to eye.    potassium chloride (KLOR-CON) 10 MEQ TbSR TAKE ONE (1) TABLET BY MOUTH TWICE A DAY WITH A FULL GLASS OF WATER FOR POTASSIUM    propylene glycol/peg 400 (SYSTANE ULTRA OPHT) Apply 1 drop to eye every 2 (two) hours.    ranitidine (ZANTAC) 150 MG tablet Take 150 mg by mouth 2 (two) times  daily.    simvastatin (ZOCOR) 20 MG tablet Take 20 mg by mouth.    travoprost (TRAVATAN Z) 0.004 % Drop     [DISCONTINUED] metFORMIN (GLUCOPHAGE) 500 MG tablet TAKE 1 TABLET BY MOUTH TWICE DAILY AS DIRECTED FOR SUGAR DIABETES TAKE WITH FOOD    diclofenac sodium (VOLTAREN) 1 % Gel Apply 2 g topically once daily.    ESTRACE 0.01 % (0.1 mg/gram) vaginal cream USE AS DIRECTED VAGINALLY ONE TO TWO TIMES PER WEEK AS DIRECTED    nitroGLYCERIN (NITROSTAT) 0.4 MG SL tablet Place 0.4 mg under the tongue.    ondansetron (ZOFRAN-ODT) 8 MG TbDL Take 1 tablet (8 mg total) by mouth every 12 (twelve) hours as needed.    oxyCODONE (OXY-IR) 5 mg Cap Take 1 capsule (5 mg total) by mouth every 6 (six) hours as needed for Pain.    polyethylene glycol (GLYCOLAX) 17 gram/dose powder Take 17 g by mouth once daily.    triamcinolone acetonide 0.1% (KENALOG) 0.1 % ointment Apply 1 applicator topically.     Family History     None        Tobacco Use    Smoking status: Never Smoker    Smokeless tobacco: Never Used   Substance and Sexual Activity    Alcohol use: No    Drug use: No    Sexual activity: Not Currently     Review of Systems   Constitutional: Positive for fatigue. Negative for activity change, appetite change, chills and fever.   HENT: Positive for sore throat. Negative for congestion, dental problem, ear pain, hearing loss, mouth sores, sinus pressure, tinnitus and trouble swallowing.    Eyes: Negative for pain, discharge, redness and visual disturbance.   Respiratory: Negative for apnea, cough, choking, chest tightness, shortness of breath and wheezing.    Cardiovascular: Negative for chest pain, palpitations and leg swelling.   Gastrointestinal: Negative for abdominal distention, abdominal pain, anal bleeding, blood in stool, constipation, diarrhea, nausea, rectal pain and vomiting.   Endocrine: Negative for cold intolerance, heat intolerance, polydipsia and polyuria.   Genitourinary: Negative for difficulty  urinating, dysuria, flank pain, frequency, hematuria and urgency.   Musculoskeletal: Negative for arthralgias, back pain, gait problem, joint swelling, myalgias, neck pain and neck stiffness.   Skin: Negative for color change, rash and wound.   Allergic/Immunologic: Negative for food allergies and immunocompromised state.   Neurological: Positive for weakness. Negative for dizziness, tremors, seizures, syncope, speech difficulty, light-headedness and headaches.   Hematological: Negative for adenopathy. Does not bruise/bleed easily.   Psychiatric/Behavioral: Negative for agitation, confusion and sleep disturbance. The patient is not nervous/anxious.    All other systems reviewed and are negative.    Objective:     Vital Signs (Most Recent):  Temp: 98.6 °F (37 °C) (12/15/19 1706)  Pulse: 98 (12/15/19 1706)  Resp: 15 (12/15/19 1706)  BP: (!) 141/61 (12/15/19 1706)  SpO2: 96 % (12/15/19 1706) Vital Signs (24h Range):  Temp:  [98.5 °F (36.9 °C)-99 °F (37.2 °C)] 98.6 °F (37 °C)  Pulse:  [93-99] 98  Resp:  [15-18] 15  SpO2:  [96 %-98 %] 96 %  BP: (116-163)/(61-67) 141/61     Weight: 87.3 kg (192 lb 8 oz)  Body mass index is 31.07 kg/m².    Physical Exam   Constitutional: She is oriented to person, place, and time. She appears well-developed and well-nourished. No distress.   HENT:   Head: Normocephalic and atraumatic.   Nose: Nose normal.   Mouth/Throat: Oropharynx is clear and moist.   Eyes: Pupils are equal, round, and reactive to light. EOM are normal. Right eye exhibits no discharge. Left eye exhibits no discharge.   Neck: Normal range of motion. Neck supple. No JVD present. No tracheal deviation present. No thyromegaly present.   Cardiovascular: Normal rate, regular rhythm, normal heart sounds and intact distal pulses. Exam reveals no gallop and no friction rub.   No murmur heard.  Pulmonary/Chest: Effort normal and breath sounds normal. No respiratory distress. She has no wheezes. She has no rales. She exhibits no  tenderness.   Abdominal: Soft. Bowel sounds are normal. She exhibits no distension and no mass. There is no tenderness.   Genitourinary:   Genitourinary Comments: U/a shows + nitrite, many bacteria   Musculoskeletal: Normal range of motion. She exhibits no edema, tenderness or deformity.   Neurological: She is alert and oriented to person, place, and time. No cranial nerve deficit. She exhibits normal muscle tone. Coordination normal.   Skin: Skin is warm and dry. Capillary refill takes less than 2 seconds. No rash noted. She is not diaphoretic. No erythema. No pallor.   Psychiatric: She has a normal mood and affect. Her behavior is normal.   Nursing note and vitals reviewed.        CRANIAL NERVES     CN III, IV, VI   Pupils are equal, round, and reactive to light.  Extraocular motions are normal.        Significant Labs:   Recent Lab Results       12/15/19  1222   12/15/19  1127   12/15/19  1109        Albumin   3.4       Alkaline Phosphatase   55       ALT   86       Anion Gap   12       Appearance, UA Clear         AST   88       Bacteria, UA Many         Basophil%   0.0       Beta-Hydroxybutyrate   0.5       Bilirubin (UA) Negative         BILIRUBIN TOTAL   1.4  Comment:  For infants and newborns, interpretation of results should be based  on gestational age, weight and in agreement with clinical  observations.  Premature Infant recommended reference ranges:  Up to 24 hours.............<8.0 mg/dL  Up to 48 hours............<12.0 mg/dL  3-5 days..................<15.0 mg/dL  6-29 days.................<15.0 mg/dL         BUN, Bld   25       Calcium   9.6       Chloride   96       CO2   29       Color, UA Yellow         Creatinine   0.7       Differential Method   Manual       eGFR if    >60       eGFR if non    >60  Comment:  Calculation used to obtain the estimated glomerular filtration  rate (eGFR) is the CKD-EPI equation.          Eosinophil%   0.0       Glucose   280        Glucose, UA Trace         Gran%   7.0       Hematocrit   41.9       Hemoglobin   13.9       Immature Grans (Abs)   CANCELED  Comment:  Mild elevation in immature granulocytes is non specific and   can be seen in a variety of conditions including stress response,   acute inflammation, trauma and pregnancy. Correlation with other   laboratory and clinical findings is essential.    Result canceled by the ancillary.         Immature Granulocytes   CANCELED  Comment:  Result canceled by the ancillary.       Ketones, UA 2+         Leukocytes, UA Negative         Lymph%   89.0       MCH   32.3       MCHC   33.2       MCV   97       Microscopic Comment SEE COMMENT  Comment:  Other formed elements not mentioned in the report are not   present in the microscopic examination.            Mono%   4.0       MPV   13.3       NITRITE UA Positive         nRBC   0       Occult Blood UA Negative         pH, UA 6.0         Platelet Estimate   Decreased       Platelets   91  Comment:  Results confirmed, test repeated       POCT Glucose     266     Potassium   3.0       PROTEIN TOTAL   7.3       Protein, UA Trace  Comment:  Recommend a 24 hour urine protein or a urine   protein/creatinine ratio if globulin induced proteinuria is  clinically suspected.           RAPID STREP A SCREEN   Negative  Comment:  See Micro for reflexed Strep culture.       RBC   4.31       RBC, UA 0         RDW   14.7       Sodium   137       Specific Gravity, UA 1.020         Specimen UA Urine, Catheterized         Squam Epithel, UA 3         Stomatocytes   Present       Tear Drop Cells   Occasional       UROBILINOGEN UA Negative         WBC Casts, UA 1         WBC, UA 0         WBC   0.33  Comment:  WBC critical result(s) called and verbal readback obtained from   Roseline Larson RN by [email or web address] 12/15/2019 11:44             All pertinent labs within the past 24 hours have been reviewed.    Significant Imaging: I have reviewed all pertinent imaging results/findings  within the past 24 hours.

## 2019-12-17 NOTE — PROGRESS NOTES
Progress Note   Palliative Care        SUBJECTIVE:     History of Present Illness:  Patient seen and examined with her daughter Savanah at bedside. Ms. Zhu is not talkative and denies pain. Her daughter has been trying to feed her some breakfast but she is not hungry so requested supplement shakes. Savanah did not have a chance to talk to her mother about code status but plans to day. Savanah's  emailed me her living will but unfortunately it was pertaining to her finances and property which did not include medical situations. Ms. Zhu had 4 bowel movements yesterday and her abdomen is less distended. Savanah is still concerned about her mother's mentation but is hopeful it will improve.    In the last 24hrs she has received the following pain medications (7a-7a):  - Morphine 5mg PO - none used      Past Medical History:   Diagnosis Date    Arthritis     Diabetes mellitus, type 2     GERD (gastroesophageal reflux disease)     Hyperlipidemia     Hypertension     Uterine prolapse      Past Surgical History:   Procedure Laterality Date    BLADDER SUSPENSION      CORONARY ARTERY BYPASS GRAFT      x3    INTRAOCULAR LENS INSERTION      left eye     History reviewed. No pertinent family history.  Review of patient's allergies indicates:   Allergen Reactions    Lisinopril Swelling     Lips swelling    Penicillins Anaphylaxis    Sulfa (sulfonamide antibiotics) Hives       Medications:    Current Facility-Administered Medications:     0.9%  NaCl infusion, , Intravenous, Continuous, ELLA Carolina-ZACKARY, Last Rate: 75 mL/hr at 12/16/19 2330    acetaminophen tablet 650 mg, 650 mg, Oral, Q4H PRN, Crissy Wagner FNP-C    acetaminophen tablet 650 mg, 650 mg, Oral, Q6H PRN, CLINTON CarolinaP-C    amLODIPine tablet 5 mg, 5 mg, Oral, Daily, CLINTON CarolinaP-C, 5 mg at 12/16/19 0920    aspirin tablet 325 mg, 325 mg, Oral, Daily, CLINTON CarolinaP-C, 325 mg at 12/16/19 0920     clopidogrel tablet 75 mg, 75 mg, Oral, Daily, ELLA Carolina-C, 75 mg at 12/16/19 0920    dextrose 10% (D10W) Bolus, 12.5 g, Intravenous, PRN, Payam Almendarez MD    dextrose 10% (D10W) Bolus, 25 g, Intravenous, PRN, Payam Almendarez MD    famotidine tablet 20 mg, 20 mg, Oral, BID, ELLA Carolina-C, 20 mg at 12/16/19 2028    fluticasone propionate 50 mcg/actuation nasal spray 50 mcg, 1 spray, Each Nostril, Daily, ELLA Carolina-C, 50 mcg at 12/16/19 0920    glucagon (human recombinant) injection 1 mg, 1 mg, Intramuscular, PRN, CHAN Carolina    glucose chewable tablet 16 g, 16 g, Oral, PRN, ELLA Carolina-ZACKARY    glucose chewable tablet 24 g, 24 g, Oral, PRN, CLINTON CarolinaP-C    insulin aspart U-100 pen 0-5 Units, 0-5 Units, Subcutaneous, QID (AC + HS) PRN, ELLA Carolina-ZACKARY, 1 Units at 12/16/19 2055    levoFLOXacin 750 mg/150 mL IVPB 750 mg, 750 mg, Intravenous, Q24H, ELLA Carolina-ZACKARY, Stopped at 12/16/19 1100    linaCLOtide capsule 290 mcg, 290 mcg, Oral, QHS, Noris Renner PA-C, 290 mcg at 12/16/19 2028    loratadine tablet 10 mg, 10 mg, Oral, Daily, Shannan Erwin NP, 10 mg at 12/16/19 1525    magic mouthwash (diphenhydrAMINE 12.5 mg/5 mL 20 mL, aluminum & magnesium hydroxide-simethicone (MYLANTA) 20 mL, lidocaine HCl 2% (XYLOCAINE) 20 mL) solution, 15 mL, Swish & Spit, Q4H PRN, ELLA Carolina-C, 15 mL at 12/17/19 0531    metoprolol succinate (TOPROL-XL) 24 hr split tablet 12.5 mg, 12.5 mg, Oral, BID, Benita Grossman, BRAD, 12.5 mg at 12/16/19 2028    morphine 100 mg/5 mL (20 mg/mL) concentrated solution 5 mg, 5 mg, Oral, Q4H PRN, Noris Renner PA-C    nitroGLYCERIN SL tablet 0.4 mg, 0.4 mg, Sublingual, Q5 Min PRN, CHAN Carolina    ondansetron injection 4 mg, 4 mg, Intravenous, Q6H PRN, CHAN Carolina    polyethylene glycol packet 17 g, 17 g, Oral, Daily, Noris KING  SHERIF Renner    simvastatin tablet 20 mg, 20 mg, Oral, QHS, CrissyCHAN Glez, 20 mg at 12/16/19 2028    sodium chloride 0.9% flush 10 mL, 10 mL, Intravenous, PRN, CrissyCHAN Hamilton    tbo-filgrastim (GRANIX) injection 480 mcg/0.8 mL, 480 mcg, Subcutaneous, Daily, Shannan Erwin, BRAD, 480 mcg at 12/16/19 1320        OBJECTIVE:   Symptom Assessment (ESAS 0-10 scale)     ESAS 0 1 2 3 4 5 6 7 8 9 10   Pain X             Dyspnea X             Anxiety X             Nausea X             Depression  X             Anorexia     X         Fatigue X             Insomnia X             Restlessness  X             Agitation X             Constipation    no  Bowel Management Plan (BMP): yes  Diarrhea        no  Comments:       ROS:  Review of Systems   Constitutional: Positive for appetite change.   HENT: Positive for mouth sores and sore throat.    Respiratory: Negative for cough and shortness of breath.    Cardiovascular: Negative for chest pain.   Gastrointestinal: Negative for abdominal pain, constipation, nausea and vomiting.   Musculoskeletal: Negative for back pain.   Neurological: Positive for speech difficulty (slurred). Negative for weakness and headaches.   Psychiatric/Behavioral: Negative for confusion and sleep disturbance. The patient is not nervous/anxious.        Physical Exam:  Vitals: Temp: 99.9 °F (37.7 °C) (12/17/19 0714)  Pulse: 104 (12/17/19 0714)  Resp: 18 (12/17/19 0714)  BP: (!) 141/66 (12/17/19 0714)  SpO2: (!) 92 % (12/17/19 0714)    Gen: well-developed, well-nourished, NAD, quiet, pensive  Head: atraumatic, normocephalic  Eyes: conjuctiva and sclera clear  Ears: hearing grossly intact with no external abnormality  Mouth: + mucositis, good dentition  Respiratory: CTAB, no wheezing or rhonchi  Heart: RRR  Abdomen: soft, NTTP, nondistended, hypoactive BS  Pulses: 1+ in DP  Extremities: no edema  Skin: no rashes or lesions  Psych: minimally cooperative, depressed mood and affect, does  not speak    Labs:  CBC:   WBC   Date Value Ref Range Status   12/17/2019 0.25 (LL) 3.90 - 12.70 K/uL Final     Comment:     WBC critical result(s) called and verbal readback obtained from   SOMMER MCDONALD RN. by NICOLE 12/17/2019 06:00       Hemoglobin   Date Value Ref Range Status   12/17/2019 11.8 (L) 12.0 - 16.0 g/dL Final     Hematocrit   Date Value Ref Range Status   12/17/2019 37.2 37.0 - 48.5 % Final     Mean Corpuscular Volume   Date Value Ref Range Status   12/17/2019 99 (H) 82 - 98 fL Final     Platelets   Date Value Ref Range Status   12/17/2019 66 (L) 150 - 350 K/uL Final       BMP  BMP  Lab Results   Component Value Date     12/17/2019    K 3.6 12/17/2019     12/17/2019    CO2 24 12/17/2019    BUN 14 12/17/2019    CREATININE 0.6 12/17/2019    CALCIUM 8.5 (L) 12/17/2019    ANIONGAP 11 12/17/2019    ESTGFRAFRICA >60 12/17/2019    EGFRNONAA >60 12/17/2019       LFT:   Lab Results   Component Value Date    AST 88 (H) 12/15/2019    ALKPHOS 55 12/15/2019    BILITOT 1.4 (H) 12/15/2019       Albumin:   Albumin   Date Value Ref Range Status   12/15/2019 3.4 (L) 3.5 - 5.2 g/dL Final         Radiology:I have reviewed all pertinent imaging results/findings within the past 24 hours.      Jennifer Zhu is a 79 y.o. year old with a history of ovarian cancer, GERD, DM 2, HTN, and hyperlipidemia who presented to the emergency department complaining of generalized weakness and sore throat. She has completed one cycle of chemotherapy and presented with pancytopenia and mucositis. Palliative Care was consulted to assist with symptom management.      PLAN   1. Mucositis  - Continue morphine solution 5mg PO q4hr PRN pain  - We will monitor usage and adjust as needed  - Continue topical treatments     2. Encounter for Palliative Care  - Code status: FULL- ongoing discussion  - Surrogate: daughter Savanah Zhu  - AD on file is for the wrong patient, we requested a copy and will scan it into the  computer     3. Stage III ovarian cancer  - Neoadjuvant chemotherapy planned with possible debulking surgery after     4. Chronic constipation  - She takes Linzess 290mg nightly at home so I will resume  - Continue daily Miralax     Thank you for allowing Palliative care to be involved in the care of Jennifer Zhu.        Medical decision making: HIGH based on high risk of death, untreated symptoms, high risk medications, poor prognosis, management of more than one chronic illness in exacerbation, managing side effects of medications or polypharmacy.      Plan required increased review of medication choice, interaction, dosing, frequency, and route due to patient complexity. Patient complexity increased by: At risk for delirium, Presence of advanced age, Patient is poor historian     > 50% of 35 min visit spent in chart review, face to face discussion of goals of care, symptom assessment, coordination of care and emotional support, formulating and communicating goals of care, exploring burden/ benefit of various approaches of treatment.            Noris Renner PA-C  Palliative Care

## 2019-12-17 NOTE — PROGRESS NOTES
[facility] -   Hematology/Oncology  Progress Note    Patient Name: Jennifer Zhu  Admission Date: 12/15/2019  Hospital Length of Stay: 1 days  Code Status: Full Code     Subjective:     HPI:   78 y/o female with PMHx of DM, GERD, HLD, HTN, and ovarian Ca that presented to the ED with c/o sore throat that onset gradually last night. She is a chemo patient of Dr. Ervin.  Associated symptoms include generalized weakness and slurred speech. Pt was too weak to ambulate. Symptoms are constant and moderate inseverity. No mitigating or exacerbating factors reported. Pt denies fever, chills, N/V/D, abd pain, congestion, dizziness, and all other symptoms at his time.   She is a full code and her SDM is her daughter, Christina.  She also presented with acute slurred speech, and right sided facial drooping.    She will be kept on OBS for neutropenia with mucisutus under the care of Hospital     She received carboplatin/Taxol every three weeks - last treatment 12/9/19.      Interval History:  Appear more alert today.  Last facial drooping noted.  States urinary symptoms including dysuria decreased.      Oncology Treatment Plan:   OP GYN PACLITAXEL CARBOPLATIN (AUC) WEEKLY    Medications:  Continuous Infusions:   sodium chloride 0.9% 75 mL/hr at 12/16/19 2330     Scheduled Meds:   amLODIPine  5 mg Oral Daily    aspirin  325 mg Oral Daily    clopidogrel  75 mg Oral Daily    famotidine  20 mg Oral BID    fluticasone propionate  1 spray Each Nostril Daily    levoFLOXacin  750 mg Intravenous Q24H    linaCLOtide  290 mcg Oral QHS    loratadine  10 mg Oral Daily    metoprolol succinate  12.5 mg Oral BID    polyethylene glycol  17 g Oral Daily    simvastatin  20 mg Oral QHS    tbo-filgrastim  480 mcg Subcutaneous Daily     PRN Meds:acetaminophen, acetaminophen, Dextrose 10% Bolus, Dextrose 10% Bolus, glucagon (human recombinant), glucose, glucose, insulin aspart U-100, magic mouthwash (diphenhydrAMINE 12.5 mg/5  mL 20 mL, aluminum & magnesium hydroxide-simethicone (MYLANTA) 20 mL, lidocaine HCl 2% (XYLOCAINE) 20 mL) solution, morphine, nitroGLYCERIN, ondansetron, sodium chloride 0.9%     Review of Systems   Constitutional: Positive for fatigue (improving). Negative for activity change, appetite change, chills and fever.   HENT: Positive for sore throat (improving) and trouble swallowing (improving). Negative for congestion, dental problem, ear pain, hearing loss, mouth sores, sinus pressure and tinnitus.    Eyes: Negative for pain, discharge, redness and visual disturbance.   Respiratory: Negative for apnea, cough, choking, chest tightness, shortness of breath and wheezing.    Cardiovascular: Negative for chest pain, palpitations and leg swelling.   Gastrointestinal: Negative for abdominal distention, abdominal pain, anal bleeding, blood in stool, constipation, diarrhea, nausea, rectal pain and vomiting.   Endocrine: Negative for cold intolerance, heat intolerance, polydipsia and polyuria.   Genitourinary: Negative for difficulty urinating, dysuria, flank pain, frequency, hematuria and urgency.   Musculoskeletal: Negative for arthralgias, back pain, gait problem, joint swelling, myalgias, neck pain and neck stiffness.   Skin: Negative for color change, rash and wound.   Allergic/Immunologic: Negative for food allergies and immunocompromised state.   Neurological: Positive for facial asymmetry (improving), speech difficulty (improving) and weakness. Negative for dizziness, tremors, seizures, syncope, light-headedness and headaches.   Hematological: Negative for adenopathy. Does not bruise/bleed easily.   Psychiatric/Behavioral: Positive for dysphoric mood. Negative for agitation, confusion and sleep disturbance. The patient is not nervous/anxious.    All other systems reviewed and are negative.    Objective:     Vital Signs (Most Recent):  Temp: 99.9 °F (37.7 °C) (12/17/19 0714)  Pulse: 104 (12/17/19 0714)  Resp: 18  (12/17/19 0714)  BP: (!) 141/66 (12/17/19 0714)  SpO2: (!) 92 % (12/17/19 0714) Vital Signs (24h Range):  Temp:  [97.9 °F (36.6 °C)-99.9 °F (37.7 °C)] 99.9 °F (37.7 °C)  Pulse:  [] 104  Resp:  [16-18] 18  SpO2:  [92 %-96 %] 92 %  BP: (111-141)/(55-66) 141/66     Weight: 88.2 kg (194 lb 6.4 oz)  Body mass index is 32.35 kg/m².  Body surface area is 2.01 meters squared.      Intake/Output Summary (Last 24 hours) at 12/17/2019 1015  Last data filed at 12/17/2019 0500  Gross per 24 hour   Intake 1715 ml   Output --   Net 1715 ml       Physical Exam   Constitutional: She appears well-developed and well-nourished. She does not have a sickly appearance. She appears ill. No distress.   HENT:   Head: Normocephalic and atraumatic.   Nose: Nose normal.   Mouth/Throat: Oropharynx is clear and moist.   Eyes: Pupils are equal, round, and reactive to light. EOM are normal. Right eye exhibits no discharge. Left eye exhibits no discharge.   Neck: Normal range of motion. Neck supple.   Cardiovascular: Normal rate, regular rhythm, normal heart sounds and intact distal pulses. Exam reveals no gallop and no friction rub.   No murmur heard.  Pulmonary/Chest: Effort normal and breath sounds normal. No respiratory distress. She has no wheezes. She has no rales. She exhibits no tenderness.   Abdominal: Soft. Bowel sounds are normal. She exhibits no distension.   Genitourinary:   Genitourinary Comments: U/a shows + nitrite, many bacteria   Musculoskeletal: Normal range of motion. She exhibits no edema, tenderness or deformity.   Neurological: She is alert. She displays tremor. A cranial nerve deficit is present. She exhibits abnormal muscle tone. Coordination abnormal.   Skin: Skin is warm and dry. Capillary refill takes less than 2 seconds. No rash noted. She is not diaphoretic. No erythema. No pallor.   Psychiatric: Her speech is delayed and slurred. Cognition and memory are impaired. She exhibits a depressed mood. She is attentive.    Nursing note and vitals reviewed.      Significant Labs:   CBC:   Recent Labs   Lab 12/15/19  1127 12/16/19  0523 12/17/19  0505   WBC 0.33* 0.27* 0.25*   HGB 13.9 11.7* 11.8*   HCT 41.9 35.5* 37.2   PLT 91* 79* 66*   , CMP:   Recent Labs   Lab 12/15/19  1127 12/16/19  0523 12/17/19  0505    138 139   K 3.0* 3.0* 3.6   CL 96 100 104   CO2 29 26 24   * 212* 196*   BUN 25* 18 14   CREATININE 0.7 0.6 0.6   CALCIUM 9.6 8.7 8.5*   PROT 7.3  --   --    ALBUMIN 3.4*  --   --    BILITOT 1.4*  --   --    ALKPHOS 55  --   --    AST 88*  --   --    ALT 86*  --   --    ANIONGAP 12 12 11   EGFRNONAA >60 >60 >60   , Coagulation: No results for input(s): PT, INR, APTT in the last 48 hours., LDH: No results for input(s): LDHCSF, BFSOURCE in the last 48 hours., LFTs:   Recent Labs   Lab 12/15/19  1127   ALT 86*   AST 88*   ALKPHOS 55   BILITOT 1.4*   PROT 7.3   ALBUMIN 3.4*   , Reticulocytes: No results for input(s): RETIC in the last 48 hours., Tumor Markers: No results for input(s): PSA, CEA, , AFPTM, UQ4809,  in the last 48 hours.    Invalid input(s): ALGTM, Urine Studies:   Recent Labs   Lab 12/15/19  1222   COLORU Yellow   APPEARANCEUA Clear   PHUR 6.0   SPECGRAV 1.020   PROTEINUA Trace*   GLUCUA Trace*   KETONESU 2+*   BILIRUBINUA Negative   OCCULTUA Negative   NITRITE Positive*   UROBILINOGEN Negative   LEUKOCYTESUR Negative   RBCUA 0   WBCUA 0   BACTERIA Many*   SQUAMEPITHEL 3    and All pertinent labs from the last 24 hours have been reviewed.    Diagnostic Results:  I have reviewed all pertinent imaging results/findings within the past 24 hours.    Assessment/Plan:     * Neutropenia associated with mucositis  Related to recent chemotherapy.  WBC 0.25/ANC 0  --Continue magic mouth was 4 times daily for mucositis    --Granix 480 mcg subcutaneously daily x 3 days started 12/16/19  --CBC daily    Thrombocytopenia  Platelets currently 66 K.  No need for transfusion at this time.  Related to recent  chemotherapy  --CBC daily  --Transfuse for platelets <10 or overt bleeding    Acute cystitis without hematuria  States urinary symptoms decreased with less dysuria today.    --Continue abx for treatment of gram negative luis alfredo UTI per     Malignant neoplasm of ovary, left  FIGO stage IIIC ovarian carcinoma receiving Carboplatin and paclitaxel, Q 3 weeks - last received first and last dose on 12/9/2019. Currently with neutropenia, mucositis, and UTI.  All chemotherapy will continue to be on hold until resolution of acute illness.  ANC 0.0.  MRI to brain negative for acute events or masses.  Chronic microvascular changes noted.    --CBC, CMP daily  --IV abx per   --IV fluids  --Magic mouth 4 times daily for mucositis related to chemo  --Granix 480 mcg subcutaneously daily x 3 days - started yesterday 12/16/19    ?All chemotherapy will continue to be on hold until patient recovers from acute illness as well as has improved ECOG status.  Currently she is unable to care for herself and is bed bound.  She will need to discharge to SNF and should follow up with us after discharging from SNF to discuss further treatment.           Thank you for your consult. I will follow-up with patient. Please contact us if you have any additional questions.     Shannan Erwin NP  Hematology/Oncology  [facility]

## 2019-12-17 NOTE — PROGRESS NOTES
PRELIM RADIOLOGY MRI BRAIN    No acute abnormality identified. Final report in AM.     Fox Webster MD

## 2019-12-17 NOTE — ASSESSMENT & PLAN NOTE
Related to recent chemotherapy.  WBC 0.25/ANC 0  --Continue magic mouth was 4 times daily for mucositis    --Granix 480 mcg subcutaneously daily x 3 days started 12/16/19  --CBC daily

## 2019-12-17 NOTE — PROGRESS NOTES
[facility] Hospital Medicine  Progress Note    Patient Name: Jennifer Zhu  MRN: [id number]  Patient Class: IP- Inpatient   Admission Date: 12/15/2019  Length of Stay: 1 days  Attending Physician: Payam Almendarez MD  Primary Care Provider: Gómez Najera MD, MD        Subjective:     Principal Problem:Neutropenia associated with mucositis        HPI:  80 y/o female with PMHx of DM, GERD, HLD, HTN, and ovarian Ca that presented to the ED with c/o sore throat that onset gradually last night. She is a chemo patient of Dr. Ervin.  Associated symptoms include generalized weakness and slurred speech. Pt was too weak to ambulate. Symptoms are constant and moderate inseverity. No mitigating or exacerbating factors reported. Pt denies fever, chills, N/V/D, abd pain, congestion, dizziness, and all other symptoms at his time.   She is a full code and her SDM is her daughter, Christina.  She will be kept on OBS for neutropenia with mucisutus under the care of [facility].    Overview/Hospital Course:  Patient was kept on OBS for neutropenia associated with mucositis and UTI under the care of [facility]. She was given IV abx, magic mouthwash, and heme/onc was consulted.     Interval History: MRI showed no acute infarct or hemorrhage. ANC 0.0 receiving Granix daily X 3 days. Discussed with palliative care and heme/onc - will hold chemotherapy for pt to go to SNF and get her strength back. Case management working on placement.    Review of Systems   Constitutional: Positive for fatigue. Negative for activity change, appetite change, chills and fever.   HENT: Positive for sore throat. Negative for congestion, dental problem, ear pain, hearing loss, mouth sores, sinus pressure, tinnitus and trouble swallowing.    Eyes: Negative for pain, discharge, redness and visual disturbance.   Respiratory: Negative for apnea, cough, choking, chest tightness, shortness of breath and wheezing.    Cardiovascular:  Negative for chest pain, palpitations and leg swelling.   Gastrointestinal: Negative for abdominal distention, abdominal pain, anal bleeding, blood in stool, constipation, diarrhea, nausea, rectal pain and vomiting.   Endocrine: Negative for cold intolerance, heat intolerance, polydipsia and polyuria.   Genitourinary: Negative for difficulty urinating, dysuria, flank pain, frequency, hematuria and urgency.   Musculoskeletal: Positive for gait problem. Negative for arthralgias, back pain, joint swelling, myalgias, neck pain and neck stiffness.   Skin: Negative for color change, rash and wound.   Allergic/Immunologic: Positive for immunocompromised state. Negative for food allergies.   Neurological: Positive for weakness. Negative for dizziness, tremors, seizures, syncope, speech difficulty, light-headedness and headaches.   Hematological: Negative for adenopathy. Does not bruise/bleed easily.   Psychiatric/Behavioral: Negative for agitation, behavioral problems, confusion and sleep disturbance. The patient is not nervous/anxious.    All other systems reviewed and are negative.    Objective:     Vital Signs (Most Recent):  Temp: 99.2 °F (37.3 °C) (12/17/19 1207)  Pulse: 101 (12/17/19 1207)  Resp: 18 (12/17/19 0714)  BP: (!) 92/51 (12/17/19 1207)  SpO2: (!) 91 % (12/17/19 1207) Vital Signs (24h Range):  Temp:  [97.9 °F (36.6 °C)-99.9 °F (37.7 °C)] 99.2 °F (37.3 °C)  Pulse:  [] 101  Resp:  [16-18] 18  SpO2:  [91 %-96 %] 91 %  BP: ()/(51-66) 92/51     Weight: 88.2 kg (194 lb 6.4 oz)  Body mass index is 32.35 kg/m².    Intake/Output Summary (Last 24 hours) at 12/17/2019 1531  Last data filed at 12/17/2019 0500  Gross per 24 hour   Intake 1325 ml   Output --   Net 1325 ml      Physical Exam   Constitutional: She is oriented to person, place, and time. She appears well-developed and well-nourished. No distress.   HENT:   Head: Normocephalic and atraumatic.   Nose: Nose normal.   Eyes: Pupils are equal, round,  and reactive to light. EOM are normal. Right eye exhibits no discharge. Left eye exhibits no discharge.   Neck: Normal range of motion. Neck supple. No JVD present. No tracheal deviation present. No thyromegaly present.   Cardiovascular: Normal rate, regular rhythm, normal heart sounds and intact distal pulses. Exam reveals no gallop and no friction rub.   No murmur heard.  Pulmonary/Chest: Effort normal and breath sounds normal. No respiratory distress. She has no wheezes. She has no rales. She exhibits no tenderness.   Abdominal: Soft. Bowel sounds are normal. She exhibits no distension and no mass. There is no tenderness.   Genitourinary:   Genitourinary Comments: U/a shows + nitrite, many bacteria   Musculoskeletal: Normal range of motion. She exhibits no edema, tenderness or deformity.   Neurological: She is alert and oriented to person, place, and time. No cranial nerve deficit. She exhibits normal muscle tone. Coordination normal.   Skin: Skin is warm and dry. Capillary refill takes less than 2 seconds. No rash noted. She is not diaphoretic. No erythema. No pallor.   Psychiatric: She has a normal mood and affect. Her behavior is normal.   Nursing note and vitals reviewed.      Significant Labs:   Recent Lab Results       12/17/19  1100   12/17/19  0528   12/17/19  0505   12/16/19  2054   12/16/19  1709        Anion Gap     11         Aniso     Slight         Baso #     0.00         Basophil%     0.0         BUN, Bld     14         Calcium     8.5         Chloride     104         CO2     24         Creatinine     0.6         Differential Method     Automated         eGFR if      >60         eGFR if non      >60  Comment:  Calculation used to obtain the estimated glomerular filtration  rate (eGFR) is the CKD-EPI equation.            Eos #     0.0         Eosinophil%     0.0         Glucose     196         Gran # (ANC)     0.0         Gran%     12.0         Hematocrit     37.2          Hemoglobin     11.8         Hypo     Occasional         Immature Grans (Abs)     0.00  Comment:  Mild elevation in immature granulocytes is non specific and   can be seen in a variety of conditions including stress response,   acute inflammation, trauma and pregnancy. Correlation with other   laboratory and clinical findings is essential.           Immature Granulocytes     0.0         Lymph #     0.2         Lymph%     72.0         MCH     31.3         MCHC     31.7         MCV     99         Mono #     0.0         Mono%     16.0         MPV     13.3         nRBC     0         Ovalocytes     Occasional         Platelet Estimate     Decreased         Platelets     66         POCT Glucose 251 193   202 196     Poik     Slight         Poly     Occasional         Potassium     3.6         RBC     3.77         RDW     15.4         Sodium     139         Tear Drop Cells     Occasional         WBC     0.25  Comment:  WBC critical result(s) called and verbal readback obtained from   SOMMER MCDONALD RN. by NICOLE 12/17/2019 06:00                              All pertinent labs within the past 24 hours have been reviewed.    Significant Imaging: I have reviewed all pertinent imaging results/findings within the past 24 hours.      Assessment/Plan:      * Neutropenia associated with mucositis  --Heme/onc following  --magic mouthwash  --palliative care following  --ANC 0.0 today  --Ganix daily X 3 days      Hypokalemia  --replete and monitor      Weakness  --PT/OT eval and tx  --MRI to r/o stroke      Thrombocytopenia  --platelets 91, likley 2/2 chemotherapy  --heme/onc consulted  --monitor and transfuse if falls below 10      Acute cystitis without hematuria  --u/a with + nitrites, many bacteria  --IV abx.      Essential hypertension  --continue amlodipine, metoprolol      Diabetes mellitus  --accuchecks and SSI  --diabetic diet  --HA1C 6.7      Coronary artery disease involving native coronary artery without angina  pectoris  --metoprolol, simvastatin  --cardiac diet        VTE Risk Mitigation (From admission, onward)         Ordered     IP VTE HIGH RISK PATIENT  Once      12/15/19 1843     Place sequential compression device  Until discontinued      12/15/19 1843                      ELLA Beck-ZACKARY  Department of Hospital Medicine   [facility]

## 2019-12-17 NOTE — ASSESSMENT & PLAN NOTE
States urinary symptoms decreased with less dysuria today.    --Continue abx for treatment of gram negative luis alfredo UTI per HM

## 2019-12-17 NOTE — PROGRESS NOTES
[facility]  Palliative Care       Patient Name: Jennifer Zhu  MRN: [id number]  Admission Date: 12/15/2019  Hospital Length of Stay: 1 days  Code Status: Full Code   Attending Provider: Payam Almendarez MD  Palliative Care Provider: Noris Renner PA-C  Primary Care Physician: Gómez Najera MD, MD  Principal Problem:Neutropenia associated with mucositis    Reason for Referral: assistance with clarification of goals of care, pain and psychosocial support  Primary CM/SW: Carolyn Ziegler RN    Followed up with daughter, Savanah, today. Savanah again expressed frustration about care prior to and during this admission. Savanah reports she does not think everyone is doing every thing they can to assist her with making a discharge plan and getting help at home. She would like patient to discharge to a SNF and have LTC sitter services when she gets home from that.     She is adamant that patient was independent prior to admit despite documentation in the chart that would suggest otherwise. She also does not think patient is appropriate for long term nursing home placement, because she believes after she builds her strength back up, she can return home.     Savanah states patient was utilizing the LTC sitter services not long ago, but lost services due to getting Medicare. She is frustrated we are not following up with LT more frequently to expedite patient's application. I explained [facility] typically has a very long wait list and there is not much that can be done to expedite the application. I also offered to follow up myself and provide her with an update.     Savanah requested I ask their  to send referrals to [facility] and [facility] in [place]. Spoke to Carolyn who will send referrals.     I also offered to have Patient Relations meet with Savanah, but she declined at this time.     3:11  Contact [facility] ([phone]) to inquire about application and was told no information could  be provided over the phone and that I would have to send an e-mail. E-mail sent to [email or web address].    3:35  Response received stating this file has been closed and that Savanah needs to call in another application.     3:40  I called patient's room and spoke to Savanah to provide update on above. She plans to contact LT and complete a new application for patient and will f/u with me if any medical documentation needs to be faxed to LT to assist with application.         Janee North, MSW, LCSW  [phone]

## 2019-12-17 NOTE — PLAN OF CARE
Fall precautions maintained. Pt free from injuries/fall.  Repositions with assist x2.  No complaints of pain.  Bed locked and low, side rails up x 2, phone and call light w/in reach.   POC and meds discussed, pt verbalized understanding.   Chart check done. Will cont to monitor.

## 2019-12-17 NOTE — ASSESSMENT & PLAN NOTE
FIGO stage IIIC ovarian carcinoma receiving Carboplatin and paclitaxel, Q 3 weeks - last received first and last dose on 12/9/2019. Currently with neutropenia, mucositis, and UTI.  All chemotherapy will continue to be on hold until resolution of acute illness.  ANC 0.0.  MRI to brain negative for acute events or masses.  Chronic microvascular changes noted.    --CBC, CMP daily  --IV abx per HM  --IV fluids  --Magic mouth 4 times daily for mucositis related to chemo  --Granix 480 mcg subcutaneously daily x 3 days - started yesterday 12/16/19    ?

## 2019-12-17 NOTE — PLAN OF CARE
Chart reviewed, pt resting in bed with call light and personal belongings within reach. Turn every 2 hours maintained to prevent skin breakdown. Magic mouth wash used as ordered PRN. Vitals stable. NS infusing at 75ml/hr. Pt able to speak more clearly due to pain in mouth decrease. Swallowing is improved from previous shift. Blood sugar checked as ordered. Pt absent of injury throughout shift, will continue to monitor.

## 2019-12-17 NOTE — SUBJECTIVE & OBJECTIVE
Interval History: MRI showed no acute infarct or hemorrhage. ANC 0.0 receiving Granix daily X 3 days. Discussed with palliative care and heme/onc - will hold chemotherapy for pt to go to SNF and get her strength back. Case management working on placement.    Review of Systems   Constitutional: Positive for fatigue. Negative for activity change, appetite change, chills and fever.   HENT: Positive for sore throat. Negative for congestion, dental problem, ear pain, hearing loss, mouth sores, sinus pressure, tinnitus and trouble swallowing.    Eyes: Negative for pain, discharge, redness and visual disturbance.   Respiratory: Negative for apnea, cough, choking, chest tightness, shortness of breath and wheezing.    Cardiovascular: Negative for chest pain, palpitations and leg swelling.   Gastrointestinal: Negative for abdominal distention, abdominal pain, anal bleeding, blood in stool, constipation, diarrhea, nausea, rectal pain and vomiting.   Endocrine: Negative for cold intolerance, heat intolerance, polydipsia and polyuria.   Genitourinary: Negative for difficulty urinating, dysuria, flank pain, frequency, hematuria and urgency.   Musculoskeletal: Positive for gait problem. Negative for arthralgias, back pain, joint swelling, myalgias, neck pain and neck stiffness.   Skin: Negative for color change, rash and wound.   Allergic/Immunologic: Positive for immunocompromised state. Negative for food allergies.   Neurological: Positive for weakness. Negative for dizziness, tremors, seizures, syncope, speech difficulty, light-headedness and headaches.   Hematological: Negative for adenopathy. Does not bruise/bleed easily.   Psychiatric/Behavioral: Negative for agitation, behavioral problems, confusion and sleep disturbance. The patient is not nervous/anxious.    All other systems reviewed and are negative.    Objective:     Vital Signs (Most Recent):  Temp: 99.2 °F (37.3 °C) (12/17/19 1207)  Pulse: 101 (12/17/19 1207)  Resp:  18 (12/17/19 0714)  BP: (!) 92/51 (12/17/19 1207)  SpO2: (!) 91 % (12/17/19 1207) Vital Signs (24h Range):  Temp:  [97.9 °F (36.6 °C)-99.9 °F (37.7 °C)] 99.2 °F (37.3 °C)  Pulse:  [] 101  Resp:  [16-18] 18  SpO2:  [91 %-96 %] 91 %  BP: ()/(51-66) 92/51     Weight: 88.2 kg (194 lb 6.4 oz)  Body mass index is 32.35 kg/m².    Intake/Output Summary (Last 24 hours) at 12/17/2019 1531  Last data filed at 12/17/2019 0500  Gross per 24 hour   Intake 1325 ml   Output --   Net 1325 ml      Physical Exam   Constitutional: She is oriented to person, place, and time. She appears well-developed and well-nourished. No distress.   HENT:   Head: Normocephalic and atraumatic.   Nose: Nose normal.   Eyes: Pupils are equal, round, and reactive to light. EOM are normal. Right eye exhibits no discharge. Left eye exhibits no discharge.   Neck: Normal range of motion. Neck supple. No JVD present. No tracheal deviation present. No thyromegaly present.   Cardiovascular: Normal rate, regular rhythm, normal heart sounds and intact distal pulses. Exam reveals no gallop and no friction rub.   No murmur heard.  Pulmonary/Chest: Effort normal and breath sounds normal. No respiratory distress. She has no wheezes. She has no rales. She exhibits no tenderness.   Abdominal: Soft. Bowel sounds are normal. She exhibits no distension and no mass. There is no tenderness.   Genitourinary:   Genitourinary Comments: U/a shows + nitrite, many bacteria   Musculoskeletal: Normal range of motion. She exhibits no edema, tenderness or deformity.   Neurological: She is alert and oriented to person, place, and time. No cranial nerve deficit. She exhibits normal muscle tone. Coordination normal.   Skin: Skin is warm and dry. Capillary refill takes less than 2 seconds. No rash noted. She is not diaphoretic. No erythema. No pallor.   Psychiatric: She has a normal mood and affect. Her behavior is normal.   Nursing note and vitals reviewed.      Significant  Labs:   Recent Lab Results       12/17/19  1100   12/17/19  0528   12/17/19  0505   12/16/19  2054   12/16/19  1709        Anion Gap     11         Aniso     Slight         Baso #     0.00         Basophil%     0.0         BUN, Bld     14         Calcium     8.5         Chloride     104         CO2     24         Creatinine     0.6         Differential Method     Automated         eGFR if      >60         eGFR if non      >60  Comment:  Calculation used to obtain the estimated glomerular filtration  rate (eGFR) is the CKD-EPI equation.            Eos #     0.0         Eosinophil%     0.0         Glucose     196         Gran # (ANC)     0.0         Gran%     12.0         Hematocrit     37.2         Hemoglobin     11.8         Hypo     Occasional         Immature Grans (Abs)     0.00  Comment:  Mild elevation in immature granulocytes is non specific and   can be seen in a variety of conditions including stress response,   acute inflammation, trauma and pregnancy. Correlation with other   laboratory and clinical findings is essential.           Immature Granulocytes     0.0         Lymph #     0.2         Lymph%     72.0         MCH     31.3         MCHC     31.7         MCV     99         Mono #     0.0         Mono%     16.0         MPV     13.3         nRBC     0         Ovalocytes     Occasional         Platelet Estimate     Decreased         Platelets     66         POCT Glucose 251 193   202 196     Poik     Slight         Poly     Occasional         Potassium     3.6         RBC     3.77         RDW     15.4         Sodium     139         Tear Drop Cells     Occasional         WBC     0.25  Comment:  WBC critical result(s) called and verbal readback obtained from   SOMMER MCDONALD RN. by NICOLE 12/17/2019 06:00                              All pertinent labs within the past 24 hours have been reviewed.    Significant Imaging: I have reviewed all pertinent imaging  results/findings within the past 24 hours.

## 2019-12-17 NOTE — TELEPHONE ENCOUNTER
Nurse returned call to Nursing home intake nurse who wanted to know who much it would cost for chemo and where will she be getting, this nurse transferred the call for further assistance to Roseline taylor , in our hematology department.

## 2019-12-17 NOTE — ASSESSMENT & PLAN NOTE
Platelets currently 66 K.  No need for transfusion at this time.  Related to recent chemotherapy  --CBC daily  --Transfuse for platelets <10 or overt bleeding

## 2019-12-17 NOTE — ASSESSMENT & PLAN NOTE
--Heme/onc following  --magic mouthwash  --palliative care following  --ANC 0.0 today  --Ganix daily X 3 days

## 2019-12-17 NOTE — SUBJECTIVE & OBJECTIVE
Interval History:  Appear more alert today.  Last facial drooping noted.  States urinary symptoms including dysuria decreased.      Oncology Treatment Plan:   OP GYN PACLITAXEL CARBOPLATIN (AUC) WEEKLY    Medications:  Continuous Infusions:   sodium chloride 0.9% 75 mL/hr at 12/16/19 2330     Scheduled Meds:   amLODIPine  5 mg Oral Daily    aspirin  325 mg Oral Daily    clopidogrel  75 mg Oral Daily    famotidine  20 mg Oral BID    fluticasone propionate  1 spray Each Nostril Daily    levoFLOXacin  750 mg Intravenous Q24H    linaCLOtide  290 mcg Oral QHS    loratadine  10 mg Oral Daily    metoprolol succinate  12.5 mg Oral BID    polyethylene glycol  17 g Oral Daily    simvastatin  20 mg Oral QHS    tbo-filgrastim  480 mcg Subcutaneous Daily     PRN Meds:acetaminophen, acetaminophen, Dextrose 10% Bolus, Dextrose 10% Bolus, glucagon (human recombinant), glucose, glucose, insulin aspart U-100, magic mouthwash (diphenhydrAMINE 12.5 mg/5 mL 20 mL, aluminum & magnesium hydroxide-simethicone (MYLANTA) 20 mL, lidocaine HCl 2% (XYLOCAINE) 20 mL) solution, morphine, nitroGLYCERIN, ondansetron, sodium chloride 0.9%     Review of Systems   Constitutional: Positive for fatigue (improving). Negative for activity change, appetite change, chills and fever.   HENT: Positive for sore throat (improving) and trouble swallowing (improving). Negative for congestion, dental problem, ear pain, hearing loss, mouth sores, sinus pressure and tinnitus.    Eyes: Negative for pain, discharge, redness and visual disturbance.   Respiratory: Negative for apnea, cough, choking, chest tightness, shortness of breath and wheezing.    Cardiovascular: Negative for chest pain, palpitations and leg swelling.   Gastrointestinal: Negative for abdominal distention, abdominal pain, anal bleeding, blood in stool, constipation, diarrhea, nausea, rectal pain and vomiting.   Endocrine: Negative for cold intolerance, heat intolerance, polydipsia and  polyuria.   Genitourinary: Negative for difficulty urinating, dysuria, flank pain, frequency, hematuria and urgency.   Musculoskeletal: Negative for arthralgias, back pain, gait problem, joint swelling, myalgias, neck pain and neck stiffness.   Skin: Negative for color change, rash and wound.   Allergic/Immunologic: Negative for food allergies and immunocompromised state.   Neurological: Positive for facial asymmetry (improving), speech difficulty (improving) and weakness. Negative for dizziness, tremors, seizures, syncope, light-headedness and headaches.   Hematological: Negative for adenopathy. Does not bruise/bleed easily.   Psychiatric/Behavioral: Positive for dysphoric mood. Negative for agitation, confusion and sleep disturbance. The patient is not nervous/anxious.    All other systems reviewed and are negative.    Objective:     Vital Signs (Most Recent):  Temp: 99.9 °F (37.7 °C) (12/17/19 0714)  Pulse: 104 (12/17/19 0714)  Resp: 18 (12/17/19 0714)  BP: (!) 141/66 (12/17/19 0714)  SpO2: (!) 92 % (12/17/19 0714) Vital Signs (24h Range):  Temp:  [97.9 °F (36.6 °C)-99.9 °F (37.7 °C)] 99.9 °F (37.7 °C)  Pulse:  [] 104  Resp:  [16-18] 18  SpO2:  [92 %-96 %] 92 %  BP: (111-141)/(55-66) 141/66     Weight: 88.2 kg (194 lb 6.4 oz)  Body mass index is 32.35 kg/m².  Body surface area is 2.01 meters squared.      Intake/Output Summary (Last 24 hours) at 12/17/2019 1015  Last data filed at 12/17/2019 0500  Gross per 24 hour   Intake 1715 ml   Output --   Net 1715 ml       Physical Exam   Constitutional: She appears well-developed and well-nourished. She does not have a sickly appearance. She appears ill. No distress.   HENT:   Head: Normocephalic and atraumatic.   Nose: Nose normal.   Mouth/Throat: Oropharynx is clear and moist.   Eyes: Pupils are equal, round, and reactive to light. EOM are normal. Right eye exhibits no discharge. Left eye exhibits no discharge.   Neck: Normal range of motion. Neck supple.    Cardiovascular: Normal rate, regular rhythm, normal heart sounds and intact distal pulses. Exam reveals no gallop and no friction rub.   No murmur heard.  Pulmonary/Chest: Effort normal and breath sounds normal. No respiratory distress. She has no wheezes. She has no rales. She exhibits no tenderness.   Abdominal: Soft. Bowel sounds are normal. She exhibits no distension.   Genitourinary:   Genitourinary Comments: U/a shows + nitrite, many bacteria   Musculoskeletal: Normal range of motion. She exhibits no edema, tenderness or deformity.   Neurological: She is alert. She displays tremor. A cranial nerve deficit is present. She exhibits abnormal muscle tone. Coordination abnormal.   Skin: Skin is warm and dry. Capillary refill takes less than 2 seconds. No rash noted. She is not diaphoretic. No erythema. No pallor.   Psychiatric: Her speech is delayed and slurred. Cognition and memory are impaired. She exhibits a depressed mood. She is attentive.   Nursing note and vitals reviewed.      Significant Labs:   CBC:   Recent Labs   Lab 12/15/19  1127 12/16/19  0523 12/17/19  0505   WBC 0.33* 0.27* 0.25*   HGB 13.9 11.7* 11.8*   HCT 41.9 35.5* 37.2   PLT 91* 79* 66*   , CMP:   Recent Labs   Lab 12/15/19  1127 12/16/19  0523 12/17/19  0505    138 139   K 3.0* 3.0* 3.6   CL 96 100 104   CO2 29 26 24   * 212* 196*   BUN 25* 18 14   CREATININE 0.7 0.6 0.6   CALCIUM 9.6 8.7 8.5*   PROT 7.3  --   --    ALBUMIN 3.4*  --   --    BILITOT 1.4*  --   --    ALKPHOS 55  --   --    AST 88*  --   --    ALT 86*  --   --    ANIONGAP 12 12 11   EGFRNONAA >60 >60 >60   , Coagulation: No results for input(s): PT, INR, APTT in the last 48 hours., LDH: No results for input(s): LDHCSF, BFSOURCE in the last 48 hours., LFTs:   Recent Labs   Lab 12/15/19  1127   ALT 86*   AST 88*   ALKPHOS 55   BILITOT 1.4*   PROT 7.3   ALBUMIN 3.4*   , Reticulocytes: No results for input(s): RETIC in the last 48 hours., Tumor Markers: No results for  input(s): PSA, CEA, , AFPTM, IY3811,  in the last 48 hours.    Invalid input(s): ALGTM, Urine Studies:   Recent Labs   Lab 12/15/19  1222   COLORU Yellow   APPEARANCEUA Clear   PHUR 6.0   SPECGRAV 1.020   PROTEINUA Trace*   GLUCUA Trace*   KETONESU 2+*   BILIRUBINUA Negative   OCCULTUA Negative   NITRITE Positive*   UROBILINOGEN Negative   LEUKOCYTESUR Negative   RBCUA 0   WBCUA 0   BACTERIA Many*   SQUAMEPITHEL 3    and All pertinent labs from the last 24 hours have been reviewed.    Diagnostic Results:  I have reviewed all pertinent imaging results/findings within the past 24 hours.

## 2019-12-17 NOTE — PT/OT/SLP PROGRESS
Physical Therapy  Treatment    Jennifer Zhu   MRN: [id number]   Admitting Diagnosis: Neutropenia associated with mucositis    PT Received On: 12/17/19  PT Start Time: 0800     PT Stop Time: 0823    PT Total Time (min): 23 min       Billable Minutes:  Therapeutic Activity 13 and Therapeutic Exercise 10    Treatment Type: Treatment  PT/PTA: PT     PTA Visit Number: 0       General Precautions: Standard, fall  Orthopedic Precautions: N/A   Braces: N/A         Subjective:  Communicated with NURSE ABIMAEL AND Epic CHART REVIEW  prior to session.   PT AGREED TO TX     Pain/Comfort  Pain Rating 1: 0/10  Location 1: leg  Pain Rating Post-Intervention 1: 5/10    Objective:   Patient found with: peripheral IV    Functional Mobility:  PT MET IN RM SUP.SIT EOB WITH MAX A AND INC TIME. PT SCOOTED TO EOB WITH MOD A. PT SEATED EOB FOR B LE TE X 10 REPS OF MIP, TKE, AP. PT ATTEMPTED TO STAND X 2 TRIALS WITH MAX A HOWEVER UNABLE TO COMPLETE. PT SUP IN BED AND SCOOTED TO HOB WITH MAX A AND LEFT WITH HOB ELEVATED.     AM-PAC 6 CLICK MOBILITY  How much help from another person does this patient currently need?   1 = Unable, Total/Dependent Assistance  2 = A lot, Maximum/Moderate Assistance  3 = A little, Minimum/Contact Guard/Supervision  4 = None, Modified [place]/Independent    Turning over in bed (including adjusting bedclothes, sheets and blankets)?: 2  Sitting down on and standing up from a chair with arms (e.g., wheelchair, bedside commode, etc.): 1  Moving from lying on back to sitting on the side of the bed?: 2  Moving to and from a bed to a chair (including a wheelchair)?: 1  Need to walk in hospital room?: 1  Climbing 3-5 steps with a railing?: 1  Basic Mobility Total Score: 8    AM-PAC Raw Score CMS G-Code Modifier Level of Impairment Assistance   6 % Total / Unable   7 - 9 CM 80 - 100% Maximal Assist   10 - 14 CL 60 - 80% Moderate Assist   15 - 19 CK 40 - 60% Moderate Assist   20 - 22 CJ 20 - 40% Minimal Assist    23 CI 1-20% SBA / CGA   24 CH 0% Independent/ Mod I     Patient left HOB elevated with call button in reach.    Assessment:  PT MILLICENT TX WITH INC FATIGUE.    Rehab identified problem list/impairments: Rehab identified problem list/impairments: weakness, gait instability, decreased upper extremity function, decreased ROM, decreased lower extremity function, impaired balance, impaired endurance, decreased safety awareness, pain, impaired self care skills, impaired functional mobilty    Rehab potential is good.    Activity tolerance: Poor    Discharge recommendations: Discharge Facility/Level of Care Needs: nursing facility, skilled     Barriers to discharge:      Equipment recommendations: Equipment Needed After Discharge: none     GOALS:   Multidisciplinary Problems     Physical Therapy Goals        Problem: Physical Therapy Goal    Goal Priority Disciplines Outcome Goal Variances Interventions   Physical Therapy Goal     PT, PT/OT Ongoing, Progressing     Description:  PT WILL BE SEEN FOR P.T. FOR A MIN OF 5 OUT OF 7 DAYS A WEEK  LT19  1. PT WILL COMPLETE B LE TE X 10 REPS  2. PT WILL T/F TO CHAIR MAX A WITH RW  3. PT WILL GT TRAIN X10'MAX A WITH RW  4. PT WILL COMPLETE BED MOBILITY WITH MOD A                       PLAN:    Patient to be seen    to address the above listed problems via gait training, therapeutic activities, therapeutic exercises  Plan of Care expires: 19  Plan of Care reviewed with: patient         Taylor Iglesias, PT  2019

## 2019-12-17 NOTE — TELEPHONE ENCOUNTER
----- Message from Rachael Urias sent at 12/17/2019  2:46 PM CST -----  Contact: [facility]   .Type:  Needs Medical Advice    Who Called: sugar   Symptoms (please be specific):   How long has patient had these symptoms:    Pharmacy name and phone #:    Would the patient rather a call back or a response via [facility]? Call   Best Call Back Number:  [phone]   Additional Information: caller is requesting a call back from the nurse in regards to the pt Chemo regiment

## 2019-12-18 NOTE — PROGRESS NOTES
[facility] -   Hematology/Oncology  Progress Note    Patient Name: Jennifer Zhu  Admission Date: 12/15/2019  Hospital Length of Stay: 2 days  Code Status: Full Code     Subjective:     HPI:   80 y/o female with PMHx of DM, GERD, HLD, HTN, and ovarian Ca that presented to the ED with c/o sore throat that onset gradually last night. She is a chemo patient of Dr. Ervin.  Associated symptoms include generalized weakness and slurred speech. Pt was too weak to ambulate. Symptoms are constant and moderate inseverity. No mitigating or exacerbating factors reported. Pt denies fever, chills, N/V/D, abd pain, congestion, dizziness, and all other symptoms at his time.   She is a full code and her SDM is her daughter, Christina.  She also presented with acute slurred speech, and right sided facial drooping.    She will be kept on OBS for neutropenia with mucisutus under the care of Hospital     She received carboplatin/Taxol every three weeks - last treatment 12/9/19.      Interval History:  Reports fatigue today. Denies pain at present but reports intermittent abdominal pain. Had nausea and vomiting overnight. Improved with increased Zofran dosing. Had large BM this morning reported as diarrhea following laxative. Reports improving in mouth pain. Able to tolerate liquids.     Oncology Treatment Plan:   OP GYN PACLITAXEL CARBOPLATIN (AUC) WEEKLY    Medications:  Continuous Infusions:    Scheduled Meds:   amLODIPine  5 mg Oral Daily    aspirin  325 mg Oral Daily    clopidogrel  75 mg Oral Daily    famotidine  20 mg Oral BID    fluticasone propionate  1 spray Each Nostril Daily    levoFLOXacin  750 mg Intravenous Q24H    linaCLOtide  290 mcg Oral QHS    loratadine  10 mg Oral Daily    metoprolol succinate  12.5 mg Oral BID    polyethylene glycol  17 g Oral Daily    simvastatin  20 mg Oral QHS    Banana bag   Intravenous Daily     PRN Meds:acetaminophen, acetaminophen, Dextrose 10% Bolus, Dextrose 10%  Bolus, glucagon (human recombinant), glucose, glucose, insulin aspart U-100, magic mouthwash (diphenhydrAMINE 12.5 mg/5 mL 20 mL, aluminum & magnesium hydroxide-simethicone (MYLANTA) 20 mL, lidocaine HCl 2% (XYLOCAINE) 20 mL) solution, morphine, nitroGLYCERIN, ondansetron, promethazine (PHENERGAN) IVPB, sodium chloride 0.9%     Review of Systems   Constitutional: Positive for fatigue. Negative for activity change, appetite change, chills and fever.   HENT: Positive for sore throat and trouble swallowing. Negative for congestion, dental problem, ear pain, hearing loss, mouth sores, sinus pressure and tinnitus.    Eyes: Negative for photophobia and visual disturbance.   Respiratory: Negative for apnea, cough, choking, chest tightness, shortness of breath and wheezing.    Cardiovascular: Negative for chest pain, palpitations and leg swelling.   Gastrointestinal: Positive for abdominal pain (intermittent), diarrhea (s/p laxative ) and nausea. Negative for abdominal distention, anal bleeding, blood in stool, constipation, rectal pain and vomiting.   Genitourinary: Negative for difficulty urinating, dysuria, frequency and hematuria.   Musculoskeletal: Positive for gait problem. Negative for arthralgias, back pain, joint swelling, myalgias, neck pain and neck stiffness.   Skin: Negative for color change, rash and wound.   Allergic/Immunologic: Negative for food allergies and immunocompromised state.   Neurological: Positive for facial asymmetry and weakness. Negative for dizziness, tremors, seizures, syncope, speech difficulty, light-headedness and headaches.   Hematological: Negative for adenopathy. Does not bruise/bleed easily.   Psychiatric/Behavioral: Positive for dysphoric mood. Negative for agitation, confusion and sleep disturbance. The patient is not nervous/anxious.    All other systems reviewed and are negative.    Objective:     Vital Signs (Most Recent):  Temp: 97.4 °F (36.3 °C) (12/18/19 1135)  Pulse: 96  (12/18/19 1135)  Resp: 16 (12/18/19 1135)  BP: (!) 106/56 (12/18/19 1135)  SpO2: (!) 93 % (12/18/19 1135) Vital Signs (24h Range):  Temp:  [97.4 °F (36.3 °C)-100.1 °F (37.8 °C)] 97.4 °F (36.3 °C)  Pulse:  [] 96  Resp:  [16-18] 16  SpO2:  [87 %-94 %] 93 %  BP: ()/(51-72) 106/56     Weight: 88.2 kg (194 lb 6.4 oz)  Body mass index is 32.35 kg/m².  Body surface area is 2.01 meters squared.      Intake/Output Summary (Last 24 hours) at 12/18/2019 1156  Last data filed at 12/17/2019 1813  Gross per 24 hour   Intake 1738.75 ml   Output --   Net 1738.75 ml       Physical Exam   Constitutional: She is oriented to person, place, and time. She appears well-developed. She does not have a sickly appearance. She appears ill. No distress.   HENT:   Head: Normocephalic and atraumatic.   Right Ear: External ear normal.   Left Ear: External ear normal.   Nose: Nose normal.   Mouth/Throat: Uvula is midline. Oropharyngeal exudate present.   Eyes: Pupils are equal, round, and reactive to light. EOM are normal. Right eye exhibits no discharge. Left eye exhibits no discharge.   Neck: Normal range of motion. Neck supple.   Cardiovascular: Normal rate, regular rhythm, normal heart sounds and intact distal pulses.   Pulmonary/Chest: Effort normal and breath sounds normal. No respiratory distress. She has no wheezes. She has no rhonchi. She has no rales. She exhibits no tenderness.   Abdominal: Soft. Bowel sounds are normal. She exhibits no distension.   Genitourinary:   Genitourinary Comments: U/a shows + nitrite, many bacteria   Musculoskeletal: Normal range of motion. She exhibits no edema, tenderness or deformity.   Lymphadenopathy:        Head (right side): No preauricular, no posterior auricular and no occipital adenopathy present.        Head (left side): No preauricular, no posterior auricular and no occipital adenopathy present.        Right cervical: No superficial cervical adenopathy present.       Left cervical: No  superficial cervical adenopathy present.   Neurological: She is alert and oriented to person, place, and time. No cranial nerve deficit.   Skin: Skin is warm and dry. Capillary refill takes less than 2 seconds. No rash noted. She is not diaphoretic. No erythema. No pallor.   Psychiatric: Her speech is normal and behavior is normal. Thought content normal. Her affect is labile. Cognition and memory are not impaired. She is attentive.   Nursing note and vitals reviewed.      Significant Labs:   CBC:   Recent Labs   Lab 12/17/19  0505 12/18/19  0333   WBC 0.25* 0.44*   HGB 11.8* 12.2   HCT 37.2 37.5   PLT 66* 67*   , CMP:   Recent Labs   Lab 12/17/19  0505 12/18/19  0333    140   K 3.6 3.1*    104   CO2 24 25   * 201*   BUN 14 16   CREATININE 0.6 0.6   CALCIUM 8.5* 8.6*   ANIONGAP 11 11   EGFRNONAA >60 >60   , Coagulation: No results for input(s): PT, INR, APTT in the last 48 hours., LDH: No results for input(s): LDHCSF, BFSOURCE in the last 48 hours., LFTs:   No results for input(s): ALT, AST, ALKPHOS, BILITOT, PROT, ALBUMIN in the last 48 hours., Reticulocytes: No results for input(s): RETIC in the last 48 hours., Tumor Markers: No results for input(s): PSA, CEA, , AFPTM, [id number],  in the last 48 hours.    Invalid input(s): ALGTM, Urine Studies:   No results for input(s): COLORU, APPEARANCEUA, PHUR, SPECGRAV, PROTEINUA, GLUCUA, KETONESU, BILIRUBINUA, OCCULTUA, NITRITE, UROBILINOGEN, LEUKOCYTESUR, RBCUA, WBCUA, BACTERIA, SQUAMEPITHEL, HYALINECASTS in the last 48 hours.    Invalid input(s): WRIGHTSUR and All pertinent labs from the last 24 hours have been reviewed.    Diagnostic Results:  I have reviewed all pertinent imaging results/findings within the past 24 hours.    Assessment/Plan:     * Neutropenia associated with mucositis  December 18, 2019  --neutropenia slowly improving.  Completed dose 3/3 Granix today  --reports mucositis improving.  Continue Magic mouthwash  --discussed with  patient irritants to avoid  --await ANC > 1000  --monitor S&S infection  --Supportive care    Thrombocytopenia  Platelets currently 66 K.  No need for transfusion at this time.  Related to recent chemotherapy  --CBC daily  --Transfuse for platelets <10 or overt bleeding    Acute cystitis without hematuria  States urinary symptoms decreased with less dysuria today.    --Continue abx for treatment of gram negative luis alfredo UTI per HM    Malignant neoplasm of ovary, left  December 18, 2019  --Patient has received 1 dose of carboplatin/Taxol Chemotherapy 12/9/19 for treatment of her ovarian cancer.  Unfortunately this has been complicated by neutropenia, mucositis, UTI, and debility.  All chemotherapy will remain on hold pending resolution of current clinical situation.  Patient neutropenia, infection, functional status would need to improve significantly to proceed with any additional chemotherapy in the future.  Likely would need dose reduction if further chemotherapy is considered.  Palliative Care to see patient to discuss long-term care planning. For now will continue to treat patient's UTI and neutropenia.  Plans to discharge to skilled nursing facility in hopes of improving functional status.  She has completed Granix injections x 3 today.  WBC slowly improving.  ANC 0.1.  Would continue IV antibiotics until ANC >1000. Low-grade temp overnight 100.1.  Continue to monitor S&S infection.  Hemoglobin normal.  Platelets 67.  Monitor and transfuse if platelet count less than 10.  Patient reports decreased appetite.  Encouraged boost supplementation.  BMP overall stable.  Hypokalemia noted.  Recommend repletion p.r.n.    ?         Thank you for your consult. I will follow-up with patient. Please contact us if you have any additional questions.     Ashlee Blackwell NP  Hematology/Oncology  [facility] -

## 2019-12-18 NOTE — PROGRESS NOTES
"  [facility] -   Adult Nutrition  Progress Note    SUMMARY     Recommendations    Recommendation: 1. Add dental soft consistency to current diet order (pt w/ painful swallowing). 2.Consider changing boost plus supplements to optisource very high protein TID. 3. RD to f/u.   Goals: Meet > 85 % EEN/EPN by RD f/u   Nutrition Goal Status: progressing towards goal   Communication of RD Recs: (POc, sticky note,reviewed with RN)    Reason for Assessment    Reason For Assessment: RD f/u   Diagnosis: (Neutropenia, hx of ovarian CA )  Relevant Medical History: DM, HLD, HTN  General Information Comments:   12/16/19.Pt currently on diabetic cardiac diet. Pt passed the ns dysphagia screen. Per pt's family, pt w/ painful swallowing & decreased appetite PTA (PO intake 50% x 1 week). Pt's family report wt loss, unsure of amount. Per epic records, pt weighed 203 lbs on 11/18/19, current uv=404 lbs ( wt loss of 4.4 % within the last month). Per [facility] 12/16/19, mild subcutaneous fat and muscle loss. PO intake today 25 %. RD offered ONS. Pt's family concern x high blood sugars. Will rec'd optisource supplements.   12/18/19.Pt continues on diabetic cardiac diet + ONS, tolerating w/ fair appetite.   Nutrition Discharge Planning:  Cardiac diabetic diet     Nutrition Risk Screen    Nutrition Risk Screen: dysphagia or difficulty swallowing    Nutrition/Diet History    Spiritual, Cultural Beliefs, Sabianism Practices, Values that Affect Care: no    Anthropometrics    Temp: 99.2 °F (37.3 °C)  Height Method: Stated  Height: 5' 5" (165.1 cm)  Height (inches): 65 in  Weight Method: Bed Scale  Weight: 88.2 kg (194 lb 6.4 oz)  Weight (lb): 194.4 lb  Ideal Body Weight (IBW), Female: 125 lb  % Ideal Body Weight, Female (lb): 155.52 lb  BMI (Calculated): 32.3  BMI Grade: 30 - 34.9- obesity - grade I       Lab/Procedures/Meds    Pertinent Labs Reviewed: reviewed  BMP  Lab Results   Component Value Date     12/18/2019    K 3.1 (L) " 12/18/2019     12/18/2019    CO2 25 12/18/2019    BUN 16 12/18/2019    CREATININE 0.6 12/18/2019    CALCIUM 8.6 (L) 12/18/2019    ANIONGAP 11 12/18/2019    ESTGFRAFRICA >60 12/18/2019    EGFRNONAA >60 12/18/2019     Lab Results   Component Value Date    CALCIUM 8.6 (L) 12/18/2019     Lab Results   Component Value Date    ALBUMIN 3.4 (L) 12/15/2019     Recent Labs   Lab 12/18/19  0613   POCTGLUCOSE 177*     Lab Results   Component Value Date    HGBA1C 6.7 (H) 12/15/2019       Pertinent Medications Reviewed: reviewed    Physical Findings/Assessment     skin: skin tear anterior greater trochanter     Estimated/Assessed Needs    Weight Used For Calorie Calculations: 88.2 kg (194 lb 7.1 oz)  Energy Calorie Requirements (kcal): 2293 - 2469  Energy Need Method: Kcal/kg  Protein Requirements: 106 g  Weight Used For Protein Calculations: 88.2 kg (194 lb 7.1 oz)     Estimated Fluid Requirement Method: RDA Method(or per mD)  RDA Method (mL): 2293         Nutrition Prescription Ordered    Current Diet Order: diabetic cardiac diet + boost plus TID    Evaluation of Received Nutrient/Fluid Intake       % Intake of Estimated Energy Needs: 75 - 100 %  % Meal Intake: 75 - 100 %    Nutrition Risk      1xweekly     Assessment and Plan    Moderate malnutrition  Nutrition Problem:  (Moderate) Protein-Calorie Malnutrition  Malnutrition in the context of Chronic Illness/Injury    Related to (etiology):  Inadequate energy intake     Signs and Symptoms (as evidenced by):  Energy Intake: <75% of estimated energy requirement for 1 week  Body Fat Depletion: mild depletion of orbitals and triceps   Muscle Mass Depletion: mild depletion of temples, clavicle region and scapular region   Weight Loss: 4.4 % within the last month     Interventions(treatment strategy):  Collaboration with other providers    Nutrition Diagnosis Status:  Improving       Monitor and Evaluation    Food and Nutrient Intake: energy intake, food and beverage  intake  Food and Nutrient Adminstration: diet order  Anthropometric Measurements: weight  Biochemical Data, Medical Tests and Procedures: electrolyte and renal panel, glucose/endocrine profile  Nutrition-Focused Physical Findings: overall appearance     Malnutrition Assessment                 Orbital Region (Subcutaneous Fat Loss): mild depletion  Upper Arm Region (Subcutaneous Fat Loss): mild depletion   Druze Region (Muscle Loss): mild depletion  Clavicle Bone Region (Muscle Loss): mild depletion  Clavicle and Acromion Bone Region (Muscle Loss): mild depletion  Scapular Bone Region (Muscle Loss): mild depletion                 Nutrition Follow-Up    RD Follow-up?: Yes

## 2019-12-18 NOTE — ASSESSMENT & PLAN NOTE
December 18, 2019  --Patient has received 1 dose of carboplatin/Taxol Chemotherapy 12/9/19 for treatment of her ovarian cancer.  Unfortunately this has been complicated by neutropenia, mucositis, UTI, and debility.  All chemotherapy will remain on hold pending resolution of current clinical situation.  Patient neutropenia, infection, functional status would need to improve significantly to proceed with any additional chemotherapy in the future.  Likely would need dose reduction if further chemotherapy is considered.  Palliative Care to see patient to discuss long-term care planning. For now will continue to treat patient's UTI and neutropenia.  Plans to discharge to skilled nursing facility in hopes of improving functional status.  She has completed Granix injections x 3 today.  WBC slowly improving.  ANC 0.1.  Would continue IV antibiotics until ANC >1000. Low-grade temp overnight 100.1.  Continue to monitor S&S infection.  Hemoglobin normal.  Platelets 67.  Monitor and transfuse if platelet count less than 10.  Patient reports decreased appetite.  Encouraged boost supplementation.  BMP overall stable.  Hypokalemia noted.  Recommend repletion p.r.n.    ?

## 2019-12-18 NOTE — ASSESSMENT & PLAN NOTE
December 18, 2019  --neutropenia slowly improving.  Completed dose 3/3 Granix today  --reports mucositis improving.  Continue Magic mouthwash  --discussed with patient irritants to avoid  --await ANC > 1000  --monitor S&S infection  --Supportive care

## 2019-12-18 NOTE — PLAN OF CARE
Recommendations     Recommendation: 1. Add dental soft consistency to current diet order (pt w/ painful swallowing). 2.Consider changing boost plus supplements to optisource very high protein TID. 3. RD to f/u.   Goals: Meet > 85 % EEN/EPN by RD f/u   Nutrition Goal Status: progressing towards goal   Communication of RD Recs: (POc, sticky note,reviewed with RN)

## 2019-12-18 NOTE — PT/OT/SLP PROGRESS
Physical Therapy  Treatment    Jennifer Zhu   MRN: [id number]   Admitting Diagnosis: Neutropenia associated with mucositis    PT Received On: 12/18/19  PT Start Time: 1025     PT Stop Time: 1050    PT Total Time (min): 25 min       Billable Minutes:  Therapeutic Activity 25    Treatment Type: Treatment  PT/PTA: PT     PTA Visit Number: 0       General Precautions: Standard, fall  Orthopedic Precautions: N/A   Braces: N/A         Subjective:  Communicated with NURSE METZ AND Epic CHART REVIEW  prior to session.   PT AGREED TO TX HOWEVER LIMITED    Pain/Comfort  Pain Rating 1: 0/10  Pain Rating Post-Intervention 1: 0/10    Objective:   Patient found with: peripheral IV    Functional Mobility:  PT MET IN RM SUP>SIT EOB WITH MAX A X 2. PT SCOOTED TO EOB WITH MAX A X 2. PT SEATED WITH KYPHOTIC POSTURE AND REFUSED TE AND CONT WITH P.T. PT RETURNED SUP IN BED AND SCOOTED TO HOB WITH TOTAL A. PT LEFT WITH HOB ELEVATED AS PT REPORTED NAUSEA. PT LEFT WITH ALL NEEDS MET AND HEELS FLOATED.   AM-PAC 6 CLICK MOBILITY  How much help from another person does this patient currently need?   1 = Unable, Total/Dependent Assistance  2 = A lot, Maximum/Moderate Assistance  3 = A little, Minimum/Contact Guard/Supervision  4 = None, Modified [place]/Independent    Turning over in bed (including adjusting bedclothes, sheets and blankets)?: 2  Sitting down on and standing up from a chair with arms (e.g., wheelchair, bedside commode, etc.): 1  Moving from lying on back to sitting on the side of the bed?: 2  Moving to and from a bed to a chair (including a wheelchair)?: 1  Need to walk in hospital room?: 1  Climbing 3-5 steps with a railing?: 1  Basic Mobility Total Score: 8    AM-PAC Raw Score CMS G-Code Modifier Level of Impairment Assistance   6 % Total / Unable   7 - 9 CM 80 - 100% Maximal Assist   10 - 14 CL 60 - 80% Moderate Assist   15 - 19 CK 40 - 60% Moderate Assist   20 - 22 CJ 20 - 40% Minimal Assist   23 CI 1-20%  SBA / CGA   24 CH 0% Independent/ Mod I     Patient left HOB elevated with call button in reach and bed alarm on.    Assessment:  PT MILLICENT MIN TX HOWEVER LIMITED D/T FATIGUE AND NAUSEA.     Rehab identified problem list/impairments: Rehab identified problem list/impairments: weakness, impaired endurance, impaired functional mobilty, gait instability, impaired self care skills, impaired balance, decreased safety awareness, decreased lower extremity function, decreased upper extremity function, decreased ROM    Rehab potential is fair.    Activity tolerance: Poor    Discharge recommendations: Discharge Facility/Level of Care Needs: nursing facility, skilled     Barriers to discharge:      Equipment recommendations: Equipment Needed After Discharge: none     GOALS:   Multidisciplinary Problems     Physical Therapy Goals        Problem: Physical Therapy Goal    Goal Priority Disciplines Outcome Goal Variances Interventions   Physical Therapy Goal     PT, PT/OT Ongoing, Not Progressing     Description:  PT WILL BE SEEN FOR P.T. FOR A MIN OF 5 OUT OF 7 DAYS A WEEK  LT19  1. PT WILL COMPLETE B LE TE X 10 REPS  2. PT WILL T/F TO CHAIR MAX A WITH RW  3. PT WILL GT TRAIN X10'MAX A WITH RW  4. PT WILL COMPLETE BED MOBILITY WITH MOD A                       PLAN:    Patient to be seen to address the above listed problems via gait training, therapeutic activities, therapeutic exercises  Plan of Care expires: 19  Plan of Care reviewed with: patient         Taylor Iglesias, PT  2019

## 2019-12-18 NOTE — PLAN OF CARE
Patient free from injury or fall. VSS. NAD. N/V frequently. Temporary relief with PRN nausea meds. Denies pain. PureWick in place. Wounds cleansed, citric paste applied. Frequent weight shift. IVF infusing. Chart check completed. Will monitor.

## 2019-12-18 NOTE — SUBJECTIVE & OBJECTIVE
Interval History: Patient with edema and rales today - getting IV fluids and furosemide was held on admit. Will get cxr and give IV furosemide. Had discussion with palliative care - chemotherapy on hold until pt is stronger - PT recommending SNF. Case management working on placement. Had N/V overnight - zofran increased and pt resting comfortably now.    Review of Systems   Constitutional: Positive for fatigue. Negative for activity change, appetite change, chills and fever.   HENT: Positive for sore throat. Negative for congestion, dental problem, ear pain, hearing loss, mouth sores, sinus pressure, tinnitus and trouble swallowing.    Eyes: Negative for pain, discharge, redness and visual disturbance.   Respiratory: Positive for shortness of breath. Negative for apnea, cough, choking, chest tightness and wheezing.    Cardiovascular: Negative for chest pain, palpitations and leg swelling.   Gastrointestinal: Negative for abdominal distention, abdominal pain, anal bleeding, blood in stool, constipation, diarrhea, nausea, rectal pain and vomiting.   Endocrine: Negative for cold intolerance, heat intolerance, polydipsia and polyuria.   Genitourinary: Negative for difficulty urinating, dysuria, flank pain, frequency, hematuria and urgency.   Musculoskeletal: Positive for gait problem. Negative for arthralgias, back pain, joint swelling, myalgias, neck pain and neck stiffness.   Skin: Negative for color change, rash and wound.   Allergic/Immunologic: Positive for immunocompromised state. Negative for food allergies.   Neurological: Positive for weakness. Negative for dizziness, tremors, seizures, syncope, speech difficulty, light-headedness and headaches.   Hematological: Negative for adenopathy. Does not bruise/bleed easily.   Psychiatric/Behavioral: Negative for agitation, behavioral problems, confusion and sleep disturbance. The patient is not nervous/anxious.    All other systems reviewed and are  negative.    Objective:     Vital Signs (Most Recent):  Temp: 97.4 °F (36.3 °C) (12/18/19 1135)  Pulse: 96 (12/18/19 1135)  Resp: 16 (12/18/19 1135)  BP: (!) 106/56 (12/18/19 1135)  SpO2: (!) 93 % (12/18/19 1135) Vital Signs (24h Range):  Temp:  [97.4 °F (36.3 °C)-100.1 °F (37.8 °C)] 97.4 °F (36.3 °C)  Pulse:  [] 96  Resp:  [16-18] 16  SpO2:  [87 %-94 %] 93 %  BP: ()/(51-72) 106/56     Weight: 88.2 kg (194 lb 6.4 oz)  Body mass index is 32.35 kg/m².    Intake/Output Summary (Last 24 hours) at 12/18/2019 1157  Last data filed at 12/17/2019 1813  Gross per 24 hour   Intake 1738.75 ml   Output --   Net 1738.75 ml      Physical Exam   Constitutional: She is oriented to person, place, and time. She appears well-developed and well-nourished. No distress.   HENT:   Head: Normocephalic and atraumatic.   Nose: Nose normal.   Eyes: Pupils are equal, round, and reactive to light. EOM are normal. Right eye exhibits no discharge. Left eye exhibits no discharge.   Neck: Normal range of motion. Neck supple. No JVD present. No tracheal deviation present. No thyromegaly present.   Cardiovascular: Normal rate, regular rhythm, normal heart sounds and intact distal pulses. Exam reveals no gallop and no friction rub.   No murmur heard.  Pulmonary/Chest: Effort normal. No respiratory distress. She has no wheezes. She has rales. She exhibits no tenderness.   Abdominal: Soft. Bowel sounds are normal. She exhibits no distension and no mass. There is no tenderness.   Genitourinary:   Genitourinary Comments: U/a shows + nitrite, many bacteria   Musculoskeletal: Normal range of motion. She exhibits edema (to ble). She exhibits no tenderness or deformity.   Neurological: She is alert and oriented to person, place, and time. No cranial nerve deficit. She exhibits normal muscle tone. Coordination normal.   Skin: Skin is warm and dry. Capillary refill takes less than 2 seconds. No rash noted. She is not diaphoretic. No erythema. No  pallor.   Psychiatric: She has a normal mood and affect. Her behavior is normal.   Nursing note and vitals reviewed.      Significant Labs:   Recent Lab Results       12/18/19  1016   12/18/19  0613   12/18/19  0333   12/17/19  2248   12/17/19  1747        Anion Gap     11         Aniso     Slight         Baso #     0.00         Basophil%     0.0         BUN, Bld     16         Calcium     8.6         Chloride     104         CO2     25         Creatinine     0.6         Differential Method     Automated         eGFR if      >60         eGFR if non      >60  Comment:  Calculation used to obtain the estimated glomerular filtration  rate (eGFR) is the CKD-EPI equation.            Eos #     0.0         Eosinophil%     0.0         Glucose     201         Gran # (ANC)     0.1         Gran%     18.2         Hematocrit     37.5         Hemoglobin     12.2         Hypo     Occasional         Immature Grans (Abs)     0.01  Comment:  Mild elevation in immature granulocytes is non specific and   can be seen in a variety of conditions including stress response,   acute inflammation, trauma and pregnancy. Correlation with other   laboratory and clinical findings is essential.           Immature Granulocytes     2.3         Lymph #     0.3         Lymph%     56.8         MCH     31.4         MCHC     32.5         MCV     97         Mono #     0.1         Mono%     22.7         MPV     13.2         nRBC     2         Ovalocytes     Occasional         Platelet Estimate     Decreased         Platelets     67         POCT Glucose 173 177   213 214     Poik     Slight         Poly     Occasional         Potassium     3.1         RBC     3.88         RDW     15.2         Sodium     140         Tear Drop Cells     Occasional         WBC     0.44  Comment:  WBC critical result(s) called and verbal readback obtained from   FELICIA ODEN RN. by NICOLE 12/18/2019 05:39                              All  pertinent labs within the past 24 hours have been reviewed.    Significant Imaging: I have reviewed all pertinent imaging results/findings within the past 24 hours.

## 2019-12-18 NOTE — PROGRESS NOTES
Advance Care Planning       Progress Note   Palliative Care        SUBJECTIVE:     History of Present Illness:  Patient seen and examined with her daughter Savanah at bedside. Ms. Zhu was a little more talkative today but still not able to carry out a conversation. Ms. Zhu had a rough night with intractable nausea and vomiting as well as mouth pain. Savanah says she offered the morphine but Ms. Zhu refused. She has continued to vomit today despite the increased Zofran dose. She is also having diarrhea and appropriately refused the bowel regimen this morning. Savanah says that chemotherapy is on hold for now but remains optimistic that therapy will restore her previous functional status and allow her to resume chemotherapy. She did say that if she does not regain strength or if chemotherapy is no longer an option that the next option would be hospice. We discussed the benefits and philosophy of hospice but Savanah remained quiet. She does not seem to open to this topic and prefers to keep the topics on an optimistic and hopeful note.    In the last 24hrs she has received the following pain medications (7a-7a):  - Morphine 5mg PO - none used      Past Medical History:   Diagnosis Date    Arthritis     Diabetes mellitus, type 2     GERD (gastroesophageal reflux disease)     Hyperlipidemia     Hypertension     Uterine prolapse      Past Surgical History:   Procedure Laterality Date    BLADDER SUSPENSION      CORONARY ARTERY BYPASS GRAFT      x3    INTRAOCULAR LENS INSERTION      left eye     History reviewed. No pertinent family history.  Review of patient's allergies indicates:   Allergen Reactions    Lisinopril Swelling     Lips swelling    Penicillins Anaphylaxis    Sulfa (sulfonamide antibiotics) Hives       Medications:    Current Facility-Administered Medications:     acetaminophen tablet 650 mg, 650 mg, Oral, Q4H PRN, CHAN Carolina    acetaminophen tablet 650 mg, 650 mg, Oral, Q6H  PRN, ELLA Carolina-C    amLODIPine tablet 5 mg, 5 mg, Oral, Daily, CLINTON CarolinaP-C, 5 mg at 12/18/19 0825    aspirin tablet 325 mg, 325 mg, Oral, Daily, Crissy Wagner FNP-C, 325 mg at 12/18/19 0824    clopidogrel tablet 75 mg, 75 mg, Oral, Daily, CLINTON CarolinaP-C, 75 mg at 12/18/19 0824    dextrose 10% (D10W) Bolus, 12.5 g, Intravenous, PRN, Payam Almendarez MD    dextrose 10% (D10W) Bolus, 25 g, Intravenous, PRN, Payam Almendarez MD    famotidine tablet 20 mg, 20 mg, Oral, BID, CLINTON CarolinaP-C, 20 mg at 12/18/19 0825    fluticasone propionate 50 mcg/actuation nasal spray 50 mcg, 1 spray, Each Nostril, Daily, ELLA Carolina-C, 50 mcg at 12/18/19 0826    glucagon (human recombinant) injection 1 mg, 1 mg, Intramuscular, PRN, ELLA Carolina-ZACKARY    glucose chewable tablet 16 g, 16 g, Oral, PRN, CLINTON CarolinaP-C    glucose chewable tablet 24 g, 24 g, Oral, PRN, Crissy Wagner FNP-C    insulin aspart U-100 pen 0-5 Units, 0-5 Units, Subcutaneous, QID (AC + HS) PRN, ELLA Carolina-C, 1 Units at 12/17/19 2249    levoFLOXacin 750 mg/150 mL IVPB 750 mg, 750 mg, Intravenous, Q24H, ELLA Carolina-C, Last Rate: 100 mL/hr at 12/18/19 0947, 750 mg at 12/18/19 0947    linaCLOtide capsule 290 mcg, 290 mcg, Oral, QHS, Noris Renner PA-C, 290 mcg at 12/17/19 2118    loratadine tablet 10 mg, 10 mg, Oral, Daily, Shannan Erwin, BRAD, 10 mg at 12/18/19 0825    magic mouthwash (diphenhydrAMINE 12.5 mg/5 mL 20 mL, aluminum & magnesium hydroxide-simethicone (MYLANTA) 20 mL, lidocaine HCl 2% (XYLOCAINE) 20 mL) solution, 15 mL, Swish & Spit, Q4H PRN, Crissy Wagner, ELLA-C, 15 mL at 12/17/19 2126    metoprolol succinate (TOPROL-XL) 24 hr split tablet 12.5 mg, 12.5 mg, Oral, BID, Benita Grossman, BRAD, 12.5 mg at 12/18/19 0825    morphine 100 mg/5 mL (20 mg/mL) concentrated solution 5 mg, 5 mg, Oral, Q4H PRN, Noris  CHRISTINE Renner PA-C    nitroGLYCERIN SL tablet 0.4 mg, 0.4 mg, Sublingual, Q5 Min PRN, CHAN Carolina    ondansetron injection 8 mg, 8 mg, Intravenous, Q6H PRN, Joanne Ervin MD, 8 mg at 12/18/19 0822    polyethylene glycol packet 17 g, 17 g, Oral, Daily, Noris Renner PA-C, 17 g at 12/17/19 0952    potassium chloride 10% oral solution 25.0667 mEq, 25.0667 mEq, Oral, Once, CHAN Carolina    prochlorperazine injection Soln 2.5 mg, 2.5 mg, Intravenous, Q4H PRN, Noris Renner PA-C    simvastatin tablet 20 mg, 20 mg, Oral, QHS, CHAN Carolina, 20 mg at 12/17/19 2118    sodium chloride 0.9% 1,000 mL with mvi, adult no.4 with vit K 3,300 unit- 150 mcg/10 mL 10 mL, thiamine 100 mg, folic acid 1 mg infusion, , Intravenous, Daily, Payam Almendarez MD, Last Rate: 50 mL/hr at 12/18/19 0947    sodium chloride 0.9% flush 10 mL, 10 mL, Intravenous, PRN, CHAN Carolina        OBJECTIVE:   Symptom Assessment (ESAS 0-10 scale)     ESAS 0 1 2 3 4 5 6 7 8 9 10   Pain X             Dyspnea X             Anxiety X             Nausea           X   Depression  X             Anorexia     X         Fatigue X             Insomnia X             Restlessness  X             Agitation X             Constipation    no  Bowel Management Plan (BMP): yes  Diarrhea        yes  Comments:       ROS:  Review of Systems   Constitutional: Positive for activity change and appetite change.   HENT: Positive for mouth sores and sore throat.    Respiratory: Negative for cough and shortness of breath.    Cardiovascular: Negative for chest pain.   Gastrointestinal: Positive for diarrhea, nausea and vomiting. Negative for abdominal pain and constipation.   Musculoskeletal: Negative for back pain.   Neurological: Positive for speech difficulty (slurred). Negative for weakness and headaches.   Psychiatric/Behavioral: Negative for confusion and sleep disturbance. The patient is not nervous/anxious.         Physical Exam:  Vitals: Temp: 97.4 °F (36.3 °C) (12/18/19 1135)  Pulse: 96 (12/18/19 1135)  Resp: 16 (12/18/19 1135)  BP: (!) 106/56 (12/18/19 1135)  SpO2: (!) 93 % (12/18/19 1135)    Gen: well-developed, well-nourished, NAD, quiet but a little more talkative today, pensive  Head: atraumatic, normocephalic  Eyes: conjuctiva and sclera clear  Ears: hearing grossly intact with no external abnormality  Mouth: + mucositis, edentulous  Respiratory: CTAB, no wheezing or rhonchi  Heart: RRR  Abdomen: soft, NTTP, distended c/w gaseous distention, hypoactive BS  Pulses: 1+ in DP  Extremities: no edema  Skin: no rashes or lesions  Psych: minimally cooperative, depressed mood and affect, does not speak much    Labs:  CBC:   WBC   Date Value Ref Range Status   12/18/2019 0.44 (LL) 3.90 - 12.70 K/uL Final     Comment:     WBC critical result(s) called and verbal readback obtained from   FELICIA ODEN RN. by NICOLE 12/18/2019 05:39         Hemoglobin   Date Value Ref Range Status   12/18/2019 12.2 12.0 - 16.0 g/dL Final       Hematocrit   Date Value Ref Range Status   12/18/2019 37.5 37.0 - 48.5 % Final       Mean Corpuscular Volume   Date Value Ref Range Status   12/18/2019 97 82 - 98 fL Final       Platelets   Date Value Ref Range Status   12/18/2019 67 (L) 150 - 350 K/uL Final       BMP  BMP  Lab Results   Component Value Date     12/18/2019    K 3.1 (L) 12/18/2019     12/18/2019    CO2 25 12/18/2019    BUN 16 12/18/2019    CREATININE 0.6 12/18/2019    CALCIUM 8.6 (L) 12/18/2019    ANIONGAP 11 12/18/2019    ESTGFRAFRICA >60 12/18/2019    EGFRNONAA >60 12/18/2019       LFT:   Lab Results   Component Value Date    AST 88 (H) 12/15/2019    ALKPHOS 55 12/15/2019    BILITOT 1.4 (H) 12/15/2019       Albumin:   Albumin   Date Value Ref Range Status   12/15/2019 3.4 (L) 3.5 - 5.2 g/dL Final         Radiology:I have reviewed all pertinent imaging results/findings within the past 24 hours.      Jennifer Zhu  is a 79 y.o. year old with a history of ovarian cancer, GERD, DM 2, HTN, and hyperlipidemia who presented to the emergency department complaining of generalized weakness and sore throat. She has completed one cycle of chemotherapy and presented with pancytopenia and mucositis. Palliative Care was consulted to assist with symptom management.      PLAN   1. Mucositis  - Continue morphine solution 5mg PO q4hr PRN pain  - Continue topical treatments     2. Encounter for Palliative Care  - Code status: FULL- ongoing discussion  - Surrogate: daughter Savanah Zhu  - AD on file is for the wrong patient, we requested a copy and will scan it into the computer     3. Stage III ovarian cancer  - Neoadjuvant chemotherapy planned with possible debulking surgery after. Given her functional decline and intolerance of the first round chemotherapy is on hold indefinitely. Savanah is still optimistic that she will improve with therapy and be able to resume chemotherapy. I will not plan to engage in further goals discussions at this time until a more definitive plan is made as these discussions seem to be detrimental to our therapeutic relationship.     4. Chronic constipation  - Currently having diarrhea so I stopped the bowel regimen    5. Intractable nausea and vomiting  - Agree with increased dose of Zofran to 8mg q6hr PRN  - Add Compazine 2.5mg q4hr PRN nausea and vomiting refractory to Zofran              Thank you for allowing Palliative care to be involved in the care of Jennifer Zhu.        Medical decision making: HIGH based on high risk of death, untreated symptoms, high risk medications, poor prognosis, management of more than one chronic illness in exacerbation, managing side effects of medications or polypharmacy.      Plan required increased review of medication choice, interaction, dosing, frequency, and route due to patient complexity. Patient complexity increased by: At risk for delirium, Presence of advanced age,  Patient is poor historian     > 50% of 35 min visit spent in chart review, face to face discussion of goals of care, symptom assessment, coordination of care and emotional support, formulating and communicating goals of care, exploring burden/ benefit of various approaches of treatment.            Noris Renner PA-C  Palliative Care

## 2019-12-18 NOTE — PT/OT/SLP PROGRESS
"Occupational Therapy  Treatment    Jennifer Zhu   MRN: [id number]   Admitting Diagnosis: Neutropenia associated with mucositis    OT Date of Treatment: 12/18/19   OT Start Time: 0950  OT Stop Time: 1013  OT Total Time (min): 23 min    Billable Minutes:  Therapeutic Activity 23 MINUTES    General Precautions: Standard, fall  Orthopedic Precautions: N/A  Braces: N/A         Subjective:  Communicated with NURSE AND Epic CHART REVIEW prior to session.    Pain/Comfort  Pain Rating 1: 0/10    Objective:        Functional Mobility:  Bed Mobility:  MAX A X 2 WITH SIT<>SUPINE    Functional Ambulation: NA    Activities of Daily Living:     Feeding adaptive equipment: NA     UE adaptive equipment: NA     LE adaptive equipment: NA       Bathing adaptive equipment: NA    Balance:   Static Sit: POOR+: Needs MINIMAL assist to maintain WITH POOR POSTURE.Dynamic Sit: POOR: N/A    Therapeutic Activities and Exercises:  PT REQ MAX A X 2 WITH BED MOBILITY SUPINE<>SIT WITH LEG LIFT. PT SAT EOB X 5 MIN  WITH POOR SITTING BALANCE IN KYPHOTIC POSTURE. PT EDUCATED ON HEP. PT TX SESSION LIMITED DUE TO NAUSEA.  AM-PAC 6 CLICK ADL   How much help from another person does this patient currently need?   1 = Unable, Total/Dependent Assistance  2 = A lot, Maximum/Moderate Assistance  3 = A little, Minimum/Contact Guard/Supervision  4 = None, Modified [place]/Independent    Putting on and taking off regular lower body clothing? : 1  Bathing (including washing, rinsing, drying)?: 1  Toileting, which includes using toilet, bedpan, or urinal? : 2  Putting on and taking off regular upper body clothing?: 2  Taking care of personal grooming such as brushing teeth?: 2  Eating meals?: 2  Daily Activity Total Score: 10     AM-PAC Raw Score CMS "G-Code Modifier Level of Impairment Assistance   6 % Total / Unable   7 - 8 CM 80 - 100% Maximal Assist   9-13 CL 60 - 80% Moderate Assist   14 - 19 CK 40 - 60% Moderate Assist   20 - 22 CJ 20 - 40% " Minimal Assist   23 CI 1-20% SBA / CGA   24 CH 0% Independent/ Mod I       Patient left HOB elevated with all lines intact, call button in reach, bed alarm on, NURSE notified and DAUGHTER present    ASSESSMENT:  Jennifer Zhu is a 79 y.o. female with a medical diagnosis of Neutropenia associated with mucositis and presents with DEBILITY AND GENERALIZED WEAKNESS. PT WILL CONTINUE TO BENEFIT FROM SKILLED OT    Rehab identified problem list/impairments: Rehab identified problem list/impairments: weakness, impaired self care skills, impaired balance, decreased safety awareness, impaired endurance, impaired functional mobilty, gait instability    Rehab potential is good.    Activity tolerance: Good    Discharge recommendations: Discharge Facility/Level of Care Needs: nursing facility, skilled     Barriers to discharge: Barriers to Discharge: None    Equipment recommendations: none     GOALS:   Multidisciplinary Problems     Occupational Therapy Goals        Problem: Occupational Therapy Goal    Goal Priority Disciplines Outcome Interventions   Occupational Therapy Goal     OT, PT/OT Ongoing, Progressing    Description:  ot goals to be met by 12-23-19  Mod a with ue dressing  Pt will tolerate 1 set x 10 reps b ue rom exercise  Mod a with bsc t/f's                    Plan:  Patient to be seen 3 x/week to address the above listed problems via self-care/home management, therapeutic exercises  Plan of Care expires: 12/23/19  Plan of Care reviewed with: patient         Stacy Metcalf, OT  12/18/2019

## 2019-12-18 NOTE — SUBJECTIVE & OBJECTIVE
Interval History:  Reports fatigue today. Denies pain at present but reports intermittent abdominal pain. Had nausea and vomiting overnight. Improved with increased Zofran dosing. Had large BM this morning reported as diarrhea following laxative. Reports improving in mouth pain. Able to tolerate liquids.     Oncology Treatment Plan:   OP GYN PACLITAXEL CARBOPLATIN (AUC) WEEKLY    Medications:  Continuous Infusions:    Scheduled Meds:   amLODIPine  5 mg Oral Daily    aspirin  325 mg Oral Daily    clopidogrel  75 mg Oral Daily    famotidine  20 mg Oral BID    fluticasone propionate  1 spray Each Nostril Daily    levoFLOXacin  750 mg Intravenous Q24H    linaCLOtide  290 mcg Oral QHS    loratadine  10 mg Oral Daily    metoprolol succinate  12.5 mg Oral BID    polyethylene glycol  17 g Oral Daily    simvastatin  20 mg Oral QHS    Banana bag   Intravenous Daily     PRN Meds:acetaminophen, acetaminophen, Dextrose 10% Bolus, Dextrose 10% Bolus, glucagon (human recombinant), glucose, glucose, insulin aspart U-100, magic mouthwash (diphenhydrAMINE 12.5 mg/5 mL 20 mL, aluminum & magnesium hydroxide-simethicone (MYLANTA) 20 mL, lidocaine HCl 2% (XYLOCAINE) 20 mL) solution, morphine, nitroGLYCERIN, ondansetron, promethazine (PHENERGAN) IVPB, sodium chloride 0.9%     Review of Systems   Constitutional: Positive for fatigue. Negative for activity change, appetite change, chills and fever.   HENT: Positive for sore throat and trouble swallowing. Negative for congestion, dental problem, ear pain, hearing loss, mouth sores, sinus pressure and tinnitus.    Eyes: Negative for photophobia and visual disturbance.   Respiratory: Negative for apnea, cough, choking, chest tightness, shortness of breath and wheezing.    Cardiovascular: Negative for chest pain, palpitations and leg swelling.   Gastrointestinal: Positive for abdominal pain (intermittent), diarrhea (s/p laxative ) and nausea. Negative for abdominal distention, anal  bleeding, blood in stool, constipation, rectal pain and vomiting.   Genitourinary: Negative for difficulty urinating, dysuria, frequency and hematuria.   Musculoskeletal: Positive for gait problem. Negative for arthralgias, back pain, joint swelling, myalgias, neck pain and neck stiffness.   Skin: Negative for color change, rash and wound.   Allergic/Immunologic: Negative for food allergies and immunocompromised state.   Neurological: Positive for facial asymmetry and weakness. Negative for dizziness, tremors, seizures, syncope, speech difficulty, light-headedness and headaches.   Hematological: Negative for adenopathy. Does not bruise/bleed easily.   Psychiatric/Behavioral: Positive for dysphoric mood. Negative for agitation, confusion and sleep disturbance. The patient is not nervous/anxious.    All other systems reviewed and are negative.    Objective:     Vital Signs (Most Recent):  Temp: 97.4 °F (36.3 °C) (12/18/19 1135)  Pulse: 96 (12/18/19 1135)  Resp: 16 (12/18/19 1135)  BP: (!) 106/56 (12/18/19 1135)  SpO2: (!) 93 % (12/18/19 1135) Vital Signs (24h Range):  Temp:  [97.4 °F (36.3 °C)-100.1 °F (37.8 °C)] 97.4 °F (36.3 °C)  Pulse:  [] 96  Resp:  [16-18] 16  SpO2:  [87 %-94 %] 93 %  BP: ()/(51-72) 106/56     Weight: 88.2 kg (194 lb 6.4 oz)  Body mass index is 32.35 kg/m².  Body surface area is 2.01 meters squared.      Intake/Output Summary (Last 24 hours) at 12/18/2019 1156  Last data filed at 12/17/2019 1813  Gross per 24 hour   Intake 1738.75 ml   Output --   Net 1738.75 ml       Physical Exam   Constitutional: She is oriented to person, place, and time. She appears well-developed. She does not have a sickly appearance. She appears ill. No distress.   HENT:   Head: Normocephalic and atraumatic.   Right Ear: External ear normal.   Left Ear: External ear normal.   Nose: Nose normal.   Mouth/Throat: Uvula is midline. Oropharyngeal exudate present.   Eyes: Pupils are equal, round, and reactive to  light. EOM are normal. Right eye exhibits no discharge. Left eye exhibits no discharge.   Neck: Normal range of motion. Neck supple.   Cardiovascular: Normal rate, regular rhythm, normal heart sounds and intact distal pulses.   Pulmonary/Chest: Effort normal and breath sounds normal. No respiratory distress. She has no wheezes. She has no rhonchi. She has no rales. She exhibits no tenderness.   Abdominal: Soft. Bowel sounds are normal. She exhibits no distension.   Genitourinary:   Genitourinary Comments: U/a shows + nitrite, many bacteria   Musculoskeletal: Normal range of motion. She exhibits no edema, tenderness or deformity.   Lymphadenopathy:        Head (right side): No preauricular, no posterior auricular and no occipital adenopathy present.        Head (left side): No preauricular, no posterior auricular and no occipital adenopathy present.        Right cervical: No superficial cervical adenopathy present.       Left cervical: No superficial cervical adenopathy present.   Neurological: She is alert and oriented to person, place, and time. No cranial nerve deficit.   Skin: Skin is warm and dry. Capillary refill takes less than 2 seconds. No rash noted. She is not diaphoretic. No erythema. No pallor.   Psychiatric: Her speech is normal and behavior is normal. Thought content normal. Her affect is labile. Cognition and memory are not impaired. She is attentive.   Nursing note and vitals reviewed.      Significant Labs:   CBC:   Recent Labs   Lab 12/17/19  0505 12/18/19  0333   WBC 0.25* 0.44*   HGB 11.8* 12.2   HCT 37.2 37.5   PLT 66* 67*   , CMP:   Recent Labs   Lab 12/17/19  0505 12/18/19  0333    140   K 3.6 3.1*    104   CO2 24 25   * 201*   BUN 14 16   CREATININE 0.6 0.6   CALCIUM 8.5* 8.6*   ANIONGAP 11 11   EGFRNONAA >60 >60   , Coagulation: No results for input(s): PT, INR, APTT in the last 48 hours., LDH: No results for input(s): LDHCSF, BFSOURCE in the last 48 hours., LFTs:   No  results for input(s): ALT, AST, ALKPHOS, BILITOT, PROT, ALBUMIN in the last 48 hours., Reticulocytes: No results for input(s): RETIC in the last 48 hours., Tumor Markers: No results for input(s): PSA, CEA, , AFPTM, [id number],  in the last 48 hours.    Invalid input(s): ALGTM, Urine Studies:   No results for input(s): COLORU, APPEARANCEUA, PHUR, SPECGRAV, PROTEINUA, GLUCUA, KETONESU, BILIRUBINUA, OCCULTUA, NITRITE, UROBILINOGEN, LEUKOCYTESUR, RBCUA, WBCUA, BACTERIA, SQUAMEPITHEL, HYALINECASTS in the last 48 hours.    Invalid input(s): WRIGHTSUR and All pertinent labs from the last 24 hours have been reviewed.    Diagnostic Results:  I have reviewed all pertinent imaging results/findings within the past 24 hours.

## 2019-12-18 NOTE — PLAN OF CARE
Referrals hard faxed to [facility] ([phone]) and [facility] and [facility] ([phone]).       12/18/19 1096   Post-Acute Status   Post-Acute Authorization Placement   Post-Acute Placement Status Referrals Sent

## 2019-12-19 PROBLEM — C78.6 PERITONEAL CARCINOMATOSIS: Status: ACTIVE | Noted: 2019-01-01

## 2019-12-19 PROBLEM — J96.01 ACUTE HYPOXEMIC RESPIRATORY FAILURE: Status: ACTIVE | Noted: 2019-01-01

## 2019-12-19 NOTE — PROGRESS NOTES
[facility] Hospital Medicine  Progress Note    Patient Name: Jennifer Zhu  MRN: [id number]  Patient Class: IP- Inpatient   Admission Date: 12/15/2019  Length of Stay: 3 days  Attending Physician: Payam Almendarez MD  Primary Care Provider: Gómez Najera MD, MD        Subjective:     Principal Problem:Neutropenia associated with mucositis        HPI:  78 y/o female with PMHx of DM, GERD, HLD, HTN, and ovarian Ca that presented to the ED with c/o sore throat that onset gradually last night. She is a chemo patient of Dr. Ervin.  Associated symptoms include generalized weakness and slurred speech. Pt was too weak to ambulate. Symptoms are constant and moderate inseverity. No mitigating or exacerbating factors reported. Pt denies fever, chills, N/V/D, abd pain, congestion, dizziness, and all other symptoms at his time.   She is a full code and her SDM is her daughter, Christina.  She will be kept on OBS for neutropenia with mucisutus under the care of [facility].    Overview/Hospital Course:  Patient was kept on OBS for neutropenia associated with mucositis and UTI under the care of [facility]. She was given IV abx, magic mouthwash, and heme/onc was consulted. 12/16: speech is more slurred - discussed with Heme/onc - will get MRI to r/o stroke. Palliative care consulted for symptom management. Continue IV abx. And magic mouthwash. Failed obs, moved to IP 12/17: MRI showed no acute infarct or hemorrhage. ANC 0.0 receiving Granix daily X 3 days. Discussed with palliative care and heme/onc - will hold chemotherapy for pt to go to SNF and get her strength back. Case management working on placement. 12/17: Patient with edema and rales today - getting IV fluids and furosemide was held on admit. Will get cxr and give IV furosemide. Had discussion with palliative care - chemotherapy on hold until pt is stronger - PT recommending SNF. Case management working on placement. Had N/V overnight - zofran  increased and pt resting comfortably now.    Interval History: Patient with increased SOB overnight - now requiring non-rebreather still unable to maintain sats - will put on BiPaP and transfer to ICU. Had discussion with patient and daughter about code status - pt wants cpr but no intubation and no ventilator. IV furosemide 20mg BID and monitor closely. Dr. Almendarez at bedside. Patient with n/v - unable to keep K+ down yesterday.    Review of Systems   Constitutional: Positive for fatigue. Negative for activity change, appetite change, chills and fever.   HENT: Positive for sore throat. Negative for congestion, dental problem, ear pain, hearing loss, mouth sores, sinus pressure, tinnitus and trouble swallowing.    Eyes: Negative for pain, discharge, redness and visual disturbance.   Respiratory: Positive for shortness of breath. Negative for apnea, cough, choking, chest tightness and wheezing.    Cardiovascular: Negative for chest pain, palpitations and leg swelling.   Gastrointestinal: Negative for abdominal distention, abdominal pain, anal bleeding, blood in stool, constipation, diarrhea, nausea, rectal pain and vomiting.   Endocrine: Negative for cold intolerance, heat intolerance, polydipsia and polyuria.   Genitourinary: Negative for difficulty urinating, dysuria, flank pain, frequency, hematuria and urgency.   Musculoskeletal: Positive for gait problem. Negative for arthralgias, back pain, joint swelling, myalgias, neck pain and neck stiffness.   Skin: Negative for color change, rash and wound.   Allergic/Immunologic: Positive for immunocompromised state. Negative for food allergies.   Neurological: Positive for weakness. Negative for dizziness, tremors, seizures, syncope, speech difficulty, light-headedness and headaches.   Hematological: Negative for adenopathy. Does not bruise/bleed easily.   Psychiatric/Behavioral: Negative for agitation, behavioral problems, confusion and sleep disturbance. The patient is  not nervous/anxious.    All other systems reviewed and are negative.    Objective:     Vital Signs (Most Recent):  Temp: 96.6 °F (35.9 °C) (12/19/19 0717)  Pulse: (!) 118 (12/19/19 0804)  Resp: (!) 22 (12/19/19 0804)  BP: 125/60 (12/19/19 0717)  SpO2: (!) 92 % (12/19/19 0804) Vital Signs (24h Range):  Temp:  [96.6 °F (35.9 °C)-99.3 °F (37.4 °C)] 96.6 °F (35.9 °C)  Pulse:  [] 118  Resp:  [14-28] 22  SpO2:  [86 %-95 %] 92 %  BP: (106-130)/(56-65) 125/60     Weight: 88.2 kg (194 lb 6.4 oz)  Body mass index is 32.35 kg/m².    Intake/Output Summary (Last 24 hours) at 12/19/2019 0814  Last data filed at 12/18/2019 1707  Gross per 24 hour   Intake 976.67 ml   Output 250 ml   Net 726.67 ml      Physical Exam   Constitutional: She is oriented to person, place, and time. She appears well-developed and well-nourished. No distress.   HENT:   Head: Normocephalic and atraumatic.   Nose: Nose normal.   Eyes: Pupils are equal, round, and reactive to light. EOM are normal. Right eye exhibits no discharge. Left eye exhibits no discharge.   Neck: Normal range of motion. Neck supple. No JVD present. No tracheal deviation present. No thyromegaly present.   Cardiovascular: Normal rate, regular rhythm, normal heart sounds and intact distal pulses. Exam reveals no gallop and no friction rub.   No murmur heard.  Pulmonary/Chest: Effort normal. No respiratory distress. She has no wheezes. She has rales. She exhibits no tenderness.   Abdominal: Soft. Bowel sounds are normal. She exhibits no distension and no mass. There is no tenderness.   Genitourinary:   Genitourinary Comments: U/a shows + nitrite, many bacteria   Musculoskeletal: Normal range of motion. She exhibits edema (trace to ble ). She exhibits no tenderness or deformity.   Neurological: She is alert and oriented to person, place, and time. No cranial nerve deficit. She exhibits normal muscle tone. Coordination normal.   Skin: Skin is warm and dry. Capillary refill takes less  than 2 seconds. No rash noted. She is not diaphoretic. No erythema. No pallor.   Psychiatric: She has a normal mood and affect. Her behavior is normal.   Nursing note and vitals reviewed.      Significant Labs:   Recent Lab Results       12/19/19  0528   12/19/19  0431   12/19/19  0127   12/18/19  2111   12/18/19  1654        Allens Test     Pass         Anion Gap   14           Aniso   Slight           Basophil%   0.0           Site     RR         BUN, Bld   20           Calcium   8.5           Chloride   107           CO2   21           Creatinine   0.7           DelSys     Nasal Can         Differential Method   Manual           eGFR if    >60           eGFR if non    >60  Comment:  Calculation used to obtain the estimated glomerular filtration  rate (eGFR) is the CKD-EPI equation.              Eosinophil%   0.0           Flow     5         Glucose   203           Gran%   32.0           Hematocrit   34.9           Hemoglobin   11.6           Hypo   Occasional           Immature Grans (Abs)   CANCELED  Comment:  Mild elevation in immature granulocytes is non specific and   can be seen in a variety of conditions including stress response,   acute inflammation, trauma and pregnancy. Correlation with other   laboratory and clinical findings is essential.    Result canceled by the ancillary.             Immature Granulocytes   CANCELED  Comment:  Result canceled by the ancillary.           Lymph%   61.0           MCH   31.8           MCHC   33.2           MCV   96           Mode     SPONT         Mono%   7.0           MPV   12.4           nRBC   0           Ovalocytes   Occasional           Platelet Estimate   Decreased           Platelets   54           POC BE     -1         POC HCO3     21.5         POC PCO2     24.8         POC PH     7.545         POC PO2     55         POC SATURATED O2     92         POCT Glucose 201     151 204     Poik   Slight           Poly   Occasional            Potassium   2.8           RBC   3.65           RDW   15.3           Sample     ARTERIAL         Sodium   142           Stomatocytes   Present           Tear Drop Cells   Occasional           WBC   2.02                            12/18/19  1016        Allens Test       Anion Gap       Aniso       Basophil%       Site       BUN, Bld       Calcium       Chloride       CO2       Creatinine       DelSys       Differential Method       eGFR if        eGFR if non        Eosinophil%       Flow       Glucose       Gran%       Hematocrit       Hemoglobin       Hypo       Immature Grans (Abs)       Immature Granulocytes       Lymph%       MCH       MCHC       MCV       Mode       Mono%       MPV       nRBC       Ovalocytes       Platelet Estimate       Platelets       POC BE       POC HCO3       POC PCO2       POC PH       POC PO2       POC SATURATED O2       POCT Glucose 173     Poik       Poly       Potassium       RBC       RDW       Sample       Sodium       Stomatocytes       Tear Drop Cells       WBC           All pertinent labs within the past 24 hours have been reviewed.    Significant Imaging: I have reviewed all pertinent imaging results/findings within the past 24 hours.      Assessment/Plan:      * Neutropenia associated with mucositis  --Heme/onc following  --magic mouthwash  --palliative care following  --ANC 0.0 today  --Ganix daily X 3 days      Acute hypoxemic respiratory failure  --oxygen saturation 81% on non-rebreather mask  --put pt on BiPaP  --transfer to ICU        Hypokalemia  --replete and monitor      Weakness  --PT/OT eval and tx  --MRI to r/o stroke      Thrombocytopenia  --platelets 91, likley 2/2 chemotherapy  --heme/onc consulted  --monitor and transfuse if falls below 10      Acute cystitis without hematuria  --u/a with + nitrites, many bacteria  --IV abx.      Essential hypertension  --continue amlodipine, metoprolol      Diabetes mellitus  --accuchecks and  SSI  --diabetic diet  --HA1C 6.7      Coronary artery disease involving native coronary artery without angina pectoris  --metoprolol, simvastatin  --cardiac diet        VTE Risk Mitigation (From admission, onward)         Ordered     IP VTE HIGH RISK PATIENT  Once      12/15/19 1843     Place sequential compression device  Until discontinued      12/15/19 1843                Critical care time spent on the evaluation and treatment of severe organ dysfunction, review of pertinent labs and imaging studies, discussions with consulting providers and discussions with patient/family: 90 minutes.      CHAN Beck  Department of Hospital Medicine   [facility]

## 2019-12-19 NOTE — PROGRESS NOTES
[facility] Hospital Medicine  Progress Note    Patient Name: Jennifer Zhu  MRN: [id number]  Patient Class: IP- Inpatient   Admission Date: 12/15/2019  Length of Stay: 2 days  Attending Physician: Payam Almendarez MD  Primary Care Provider: Gómez Najera MD, MD        Subjective:     Principal Problem:Neutropenia associated with mucositis        HPI:  78 y/o female with PMHx of DM, GERD, HLD, HTN, and ovarian Ca that presented to the ED with c/o sore throat that onset gradually last night. She is a chemo patient of Dr. Ervin.  Associated symptoms include generalized weakness and slurred speech. Pt was too weak to ambulate. Symptoms are constant and moderate inseverity. No mitigating or exacerbating factors reported. Pt denies fever, chills, N/V/D, abd pain, congestion, dizziness, and all other symptoms at his time.   She is a full code and her SDM is her daughter, Christina.  She will be kept on OBS for neutropenia with mucisutus under the care of [facility].    Overview/Hospital Course:  Patient was kept on OBS for neutropenia associated with mucositis and UTI under the care of [facility]. She was given IV abx, magic mouthwash, and heme/onc was consulted. 12/16: speech is more slurred - discussed with Heme/onc - will get MRI to r/o stroke. Palliative care consulted for symptom management. Continue IV abx. And magic mouthwash. Failed obs, moved to IP 12/17: MRI showed no acute infarct or hemorrhage. ANC 0.0 receiving Granix daily X 3 days. Discussed with palliative care and heme/onc - will hold chemotherapy for pt to go to SNF and get her strength back. Case management working on placement.    Interval History: Patient with edema and rales today - getting IV fluids and furosemide was held on admit. Will get cxr and give IV furosemide. Had discussion with palliative care - chemotherapy on hold until pt is stronger - PT recommending SNF. Case management working on placement. Had N/V  overnight - zofran increased and pt resting comfortably now.    Review of Systems   Constitutional: Positive for fatigue. Negative for activity change, appetite change, chills and fever.   HENT: Positive for sore throat. Negative for congestion, dental problem, ear pain, hearing loss, mouth sores, sinus pressure, tinnitus and trouble swallowing.    Eyes: Negative for pain, discharge, redness and visual disturbance.   Respiratory: Positive for shortness of breath. Negative for apnea, cough, choking, chest tightness and wheezing.    Cardiovascular: Negative for chest pain, palpitations and leg swelling.   Gastrointestinal: Negative for abdominal distention, abdominal pain, anal bleeding, blood in stool, constipation, diarrhea, nausea, rectal pain and vomiting.   Endocrine: Negative for cold intolerance, heat intolerance, polydipsia and polyuria.   Genitourinary: Negative for difficulty urinating, dysuria, flank pain, frequency, hematuria and urgency.   Musculoskeletal: Positive for gait problem. Negative for arthralgias, back pain, joint swelling, myalgias, neck pain and neck stiffness.   Skin: Negative for color change, rash and wound.   Allergic/Immunologic: Positive for immunocompromised state. Negative for food allergies.   Neurological: Positive for weakness. Negative for dizziness, tremors, seizures, syncope, speech difficulty, light-headedness and headaches.   Hematological: Negative for adenopathy. Does not bruise/bleed easily.   Psychiatric/Behavioral: Negative for agitation, behavioral problems, confusion and sleep disturbance. The patient is not nervous/anxious.    All other systems reviewed and are negative.    Objective:     Vital Signs (Most Recent):  Temp: 97.4 °F (36.3 °C) (12/18/19 1135)  Pulse: 96 (12/18/19 1135)  Resp: 16 (12/18/19 1135)  BP: (!) 106/56 (12/18/19 1135)  SpO2: (!) 93 % (12/18/19 1135) Vital Signs (24h Range):  Temp:  [97.4 °F (36.3 °C)-100.1 °F (37.8 °C)] 97.4 °F (36.3 °C)  Pulse:   [] 96  Resp:  [16-18] 16  SpO2:  [87 %-94 %] 93 %  BP: ()/(51-72) 106/56     Weight: 88.2 kg (194 lb 6.4 oz)  Body mass index is 32.35 kg/m².    Intake/Output Summary (Last 24 hours) at 12/18/2019 1157  Last data filed at 12/17/2019 1813  Gross per 24 hour   Intake 1738.75 ml   Output --   Net 1738.75 ml      Physical Exam   Constitutional: She is oriented to person, place, and time. She appears well-developed and well-nourished. No distress.   HENT:   Head: Normocephalic and atraumatic.   Nose: Nose normal.   Eyes: Pupils are equal, round, and reactive to light. EOM are normal. Right eye exhibits no discharge. Left eye exhibits no discharge.   Neck: Normal range of motion. Neck supple. No JVD present. No tracheal deviation present. No thyromegaly present.   Cardiovascular: Normal rate, regular rhythm, normal heart sounds and intact distal pulses. Exam reveals no gallop and no friction rub.   No murmur heard.  Pulmonary/Chest: Effort normal. No respiratory distress. She has no wheezes. She has rales. She exhibits no tenderness.   Abdominal: Soft. Bowel sounds are normal. She exhibits no distension and no mass. There is no tenderness.   Genitourinary:   Genitourinary Comments: U/a shows + nitrite, many bacteria   Musculoskeletal: Normal range of motion. She exhibits edema (to ble). She exhibits no tenderness or deformity.   Neurological: She is alert and oriented to person, place, and time. No cranial nerve deficit. She exhibits normal muscle tone. Coordination normal.   Skin: Skin is warm and dry. Capillary refill takes less than 2 seconds. No rash noted. She is not diaphoretic. No erythema. No pallor.   Psychiatric: She has a normal mood and affect. Her behavior is normal.   Nursing note and vitals reviewed.      Significant Labs:   Recent Lab Results       12/18/19  1016   12/18/19  0613   12/18/19  0333   12/17/19  2248   12/17/19  1747        Anion Gap     11         Aniso     Slight         Baso #      0.00         Basophil%     0.0         BUN, Bld     16         Calcium     8.6         Chloride     104         CO2     25         Creatinine     0.6         Differential Method     Automated         eGFR if      >60         eGFR if non      >60  Comment:  Calculation used to obtain the estimated glomerular filtration  rate (eGFR) is the CKD-EPI equation.            Eos #     0.0         Eosinophil%     0.0         Glucose     201         Gran # (ANC)     0.1         Gran%     18.2         Hematocrit     37.5         Hemoglobin     12.2         Hypo     Occasional         Immature Grans (Abs)     0.01  Comment:  Mild elevation in immature granulocytes is non specific and   can be seen in a variety of conditions including stress response,   acute inflammation, trauma and pregnancy. Correlation with other   laboratory and clinical findings is essential.           Immature Granulocytes     2.3         Lymph #     0.3         Lymph%     56.8         MCH     31.4         MCHC     32.5         MCV     97         Mono #     0.1         Mono%     22.7         MPV     13.2         nRBC     2         Ovalocytes     Occasional         Platelet Estimate     Decreased         Platelets     67         POCT Glucose 173 177   213 214     Poik     Slight         Poly     Occasional         Potassium     3.1         RBC     3.88         RDW     15.2         Sodium     140         Tear Drop Cells     Occasional         WBC     0.44  Comment:  WBC critical result(s) called and verbal readback obtained from   FELICIA ODEN RN. by NICOLE 12/18/2019 05:39                              All pertinent labs within the past 24 hours have been reviewed.    Significant Imaging: I have reviewed all pertinent imaging results/findings within the past 24 hours.      Assessment/Plan:      * Neutropenia associated with mucositis  --Heme/onc following  --magic mouthwash  --palliative care following  --ANC 0.0  today  --Ganix daily X 3 days      Hypokalemia  --replete and monitor      Weakness  --PT/OT eval and tx  --MRI to r/o stroke      Thrombocytopenia  --platelets 91, likley 2/2 chemotherapy  --heme/onc consulted  --monitor and transfuse if falls below 10      Acute cystitis without hematuria  --u/a with + nitrites, many bacteria  --IV abx.      Essential hypertension  --continue amlodipine, metoprolol      Diabetes mellitus  --accuchecks and SSI  --diabetic diet  --HA1C 6.7      Coronary artery disease involving native coronary artery without angina pectoris  --metoprolol, simvastatin  --cardiac diet        VTE Risk Mitigation (From admission, onward)         Ordered     IP VTE HIGH RISK PATIENT  Once      12/15/19 1843     Place sequential compression device  Until discontinued      12/15/19 1843                      ELLA Beck-C  Department of Hospital Medicine   [facility]

## 2019-12-19 NOTE — PLAN OF CARE
Problem: Fall Injury Risk  Goal: Absence of Fall and Fall-Related Injury  Outcome: Ongoing, Progressing  Intervention: Promote Injury-Free Environment  Flowsheets (Taken 12/19/2019 0204)  Safety Promotion/Fall Prevention: assistive device/personal item within reach; side rails raised x 2; Fall Risk reviewed with patient/family; instructed to call staff for mobility; nonskid shoes/socks when out of bed  Environmental Safety Modification: assistive device/personal items within reach; clutter free environment maintained; lighting adjusted; room organization consistent     Problem: Diabetes Comorbidity  Goal: Blood Glucose Level Within Desired Range  Outcome: Ongoing, Progressing  Intervention: Maintain Glycemic Control  Flowsheets (Taken 12/19/2019 0204)  Glycemic Management: blood glucose monitoring     Problem: Skin Injury Risk Increased  Goal: Skin Health and Integrity  Outcome: Ongoing, Progressing  Intervention: Promote and Optimize Oral Intake  Flowsheets (Taken 12/19/2019 0204)  Oral Nutrition Promotion: rest periods promoted

## 2019-12-19 NOTE — HOSPITAL COURSE
Examined on arrival to ICU; tachypnea 40-50 on BiPap 10/5 @80% with hypotension per NIBP  Arterial line placed to further evaluate BP; reading 105-120 systolic  12/20 - overnight decline with worsening hypoxia, increased work of breathing despite aggressive AVAPS support along with decreased responsiveness and hypotension refractory to IVF requiring pressor initiation  Ms Morrison (daughter) is at bedside and aware of decline; Dr Ervin and I discussed current status and likely impending full cardiopulmonary arrest along with code status and futility of code without intubation/mechanical ventilation in this situation, we expressed concern for Ms Zhu's comfort at this point s/t work of breathing  Daughter agrees we should not attempt resuscitation with honoring Ms Zhu's desire for no intubation. We will provide some mild dyspnea treatment and continue support with NIPPV and pressor until additional family arrives as they are headed here now

## 2019-12-19 NOTE — PROGRESS NOTES
[facility]   Palliative Care       Patient Name: Jennifer Zhu  MRN: [id number]  Admission Date: 12/15/2019  Hospital Length of Stay: 3 days  Code Status: Full Code   Attending Provider: Payam Almendarez MD  Palliative Care Provider: CELESTE RamosC  Primary Care Physician: Gómez Najera MD, MD  Principal Problem:Neutropenia associated with mucositis    Reason for Referral: assistance with advance directives, pain and psychosocial support  Primary CM/SW: Carolyn Ziegler RN    Met with patient and daughter, Savanah, along with GERRI Ramos. Savanah met with someone from LT to complete application for sitter services. Savanah was told patient still qualifies. However, she is unsure how long the wait will be for services and did mention returning home is dependent on hospital course and possible need for need for nursing home placement. Noris did explore options if patient is unable to resume chemo, which would include comfort-focused treatment/hospice. Savanah remains hopeful patient will get stronger at a SNF and be able to resume chemo.    Janee North, MSW, [facility]  [phone]

## 2019-12-19 NOTE — PT/OT/SLP PROGRESS
Speech Language Pathology      Jennifer Zhu  MRN: [id number]    ST initiated swallow eval with chart review and nursing interview.  Currently pt is on a NRB with nausea and vomiting.  She is not appropriate for swallow eval due to decline in respiratory status.  ST will complete swallow eval when pt's status improves.     Padmini Garcia, HERNESTO-SLP

## 2019-12-19 NOTE — CONSULTS
Pharmacokinetic Initial Assessment: IV Vancomycin    Assessment/Plan:    Initiate intravenous vancomycin with loading dose of 2500 mg once followed by a maintenance dose of vancomycin 2250 mg IV every 24 hours  Desired empiric serum trough concentration is 15 to 20 mcg/mL  Draw vancomycin trough level 30 min prior to third dose on 12/21/19 at approximately 1530.    Pharmacy will continue to follow and monitor vancomycin.      Please contact pharmacy at extension [phone] with any questions regarding this assessment.     Thank you for the consult,   Jelena Cary       Patient brief summary:  Jennifer Zhu is a 79 y.o. female initiated on antimicrobial therapy with IV Vancomycin for treatment of suspected lower respiratory infection    Drug Allergies:   Review of patient's allergies indicates:   Allergen Reactions    Lisinopril Swelling     Lips swelling    Penicillins Anaphylaxis    Sulfa (sulfonamide antibiotics) Hives       Actual Body Weight:   88.2 Kg     Renal Function:   Estimated Creatinine Clearance: 70.3 mL/min (based on SCr of 0.7 mg/dL).,     Dialysis Method (if applicable):  N/A    CBC (last 72 hours):  Recent Labs   Lab Result Units 12/17/19  0505 12/18/19  0333 12/19/19  0431   WBC K/uL 0.25* 0.44* 2.02*   Hemoglobin g/dL 11.8* 12.2 11.6*   Hematocrit % 37.2 37.5 34.9*   Platelets K/uL 66* 67* 54*   Gran% % 12.0* 18.2* 32.0*   Lymph% % 72.0* 56.8* 61.0*   Mono% % 16.0* 22.7* 7.0   Eosinophil% % 0.0 0.0 0.0   Basophil% % 0.0 0.0 0.0   Differential Method  Automated Automated Manual       Metabolic Panel (last 72 hours):  Recent Labs   Lab Result Units 12/17/19  0505 12/18/19  0333 12/19/19  0431   Sodium mmol/L 139 140 142   Potassium mmol/L 3.6 3.1* 2.8*   Chloride mmol/L 104 104 107   CO2 mmol/L 24 25 21*   Glucose mg/dL 196* 201* 203*   BUN, Bld mg/dL 14 16 20   Creatinine mg/dL 0.6 0.6 0.7       Drug levels (last 3 results):  No results for input(s): VANCOMYCINRA, VANCOMYCINPE, VANCOMYCINTR in  the last 72 hours.    Microbiologic Results:  Microbiology Results (last 7 days)       Procedure Component Value Units Date/Time    Blood culture [[id number]] Collected:  12/19/19 1553    Order Status:  Sent Specimen:  Blood Updated:  12/19/19 1553    Blood culture [[id number]] Collected:  12/19/19 1544    Order Status:  Sent Specimen:  Blood Updated:  12/19/19 1544    Strep A culture, throat [[id number]] Collected:  12/15/19 1127    Order Status:  Completed Specimen:  Throat Updated:  12/19/19 1237     Strep A Culture No  Group A  Streptococcus isolated    Urine culture [[id number]]  (Abnormal)  (Susceptibility) Collected:  12/15/19 1222    Order Status:  Completed Specimen:  Urine Updated:  12/18/19 0052     Urine Culture, Routine ESCHERICHIA COLI  >100,000 cfu/ml      Narrative:       Preferred Collection Type->Urine, Catheterized    Urine culture High Risk **CANNOT BE ORDERED STAT** [[id number]]     Order Status:  Completed Specimen:  Urine, Clean Catch     Urine culture High Risk **CANNOT BE ORDERED STAT** [[id number]]     Order Status:  Canceled Specimen:  Urine, Clean Catch     Rapid strep screen [[id number]] Collected:  12/15/19 1127    Order Status:  Completed Specimen:  Throat Updated:  12/15/19 1202     Rapid Strep A Screen Negative     Comment: See Micro for reflexed Strep culture.       Blood Culture #2 **CANNOT BE ORDERED STAT** [[id number]]     Order Status:  Canceled Specimen:  Blood     Blood Culture #1 **CANNOT BE ORDERED STAT** [[id number]]     Order Status:  Canceled Specimen:  Blood

## 2019-12-19 NOTE — CONSULTS
[facility] -   Critical Care Medicine  Consult Note    Patient Name: Jennifer Zhu  MRN: [id number]  Admission Date: 12/15/2019  Hospital Length of Stay: 3 days  Code Status: Full Code  Attending Physician: Payam Almendarez MD   Primary Care Provider: Gómez Najera MD, MD   Principal Problem: Acute hypoxemic respiratory failure      Subjective:     HPI:  79 year old female with PMH including CAD post CABG 2011; DM2; HTN; HLD; arthritis; GERD; constipation; and stage III ovarian cancer (completed first cycle of TAXOL/CARBOPLATIN  on 12/9/19)  Presented to ED on 12/15 with c/o sore throat, weakness, slurred speech  Admitted to OBS for neutropenia with mucositis  MRI completed s/t weakness and ?slurred speech, no acute findings  Neutropenia treated with granix and slowly improving  UTI, grew pan sensitive e.coli, now day 5 levaquin  Last 24 hours declining O2 sats with abg confirmed hypoxia and concern for pulmonary edema requiring NRB and IV lasix  Being transferred to ICU today for continuous NIPPV  Heme/onc and palliative medicine have been involved in care; surrogate decision maker is Prince (daughter), she and Ms Zhu have agreed on DNI status but still desire CPR/shocks/meds in event of arrest    Hospital/ICU Course:  Examined on arrival to ICU; tachypnea 40-50 on BiPap 10/5 @80% with hypotension per NIBP  Arterial line placed to further evaluate BP; reading 105-120 systolic    Past Medical History:   Diagnosis Date    Arthritis     Diabetes mellitus, type 2     GERD (gastroesophageal reflux disease)     Hyperlipidemia     Hypertension     Uterine prolapse        Past Surgical History:   Procedure Laterality Date    BLADDER SUSPENSION      CORONARY ARTERY BYPASS GRAFT      x3    INTRAOCULAR LENS INSERTION      left eye       Review of patient's allergies indicates:   Allergen Reactions    Lisinopril Swelling     Lips swelling    Penicillins Anaphylaxis    Sulfa (sulfonamide  antibiotics) Hives       Family History     None        Tobacco Use    Smoking status: Never Smoker    Smokeless tobacco: Never Used   Substance and Sexual Activity    Alcohol use: No    Drug use: No    Sexual activity: Not Currently         Review of Systems   Reason unable to perform ROS: limited s/t work of breathing.     Objective:     Vital Signs (Most Recent):  Temp: 97.9 °F (36.6 °C) (12/19/19 1137)  Pulse: (!) 53 (12/19/19 1430)  Resp: (!) 43 (12/19/19 1430)  BP: (!) 70/52 (12/19/19 1345)  SpO2: (!) 94 % (12/19/19 1430) Vital Signs (24h Range):  Temp:  [96.6 °F (35.9 °C)-99.3 °F (37.4 °C)] 97.9 °F (36.6 °C)  Pulse:  [] 53  Resp:  [14-49] 43  SpO2:  [81 %-95 %] 94 %  BP: ()/() 70/52     Weight: 88.2 kg (194 lb 6.4 oz)  Body mass index is 32.35 kg/m².      Intake/Output Summary (Last 24 hours) at 12/19/2019 1438  Last data filed at 12/19/2019 1400  Gross per 24 hour   Intake 1710.84 ml   Output 750 ml   Net 960.84 ml       Physical Exam   Constitutional: She is oriented to person, place, and time. She is sleeping and cooperative. She is easily aroused. She has a sickly appearance. She appears ill.   HENT:   Head: Atraumatic.   Eyes: Conjunctivae are normal.   Neck: No tracheal deviation present.   Cardiovascular: An irregularly irregular rhythm present. Tachycardia present.   Pulses:       Radial pulses are 2+ on the right side, and 2+ on the left side.        Dorsalis pedis pulses are 1+ on the right side, and 1+ on the left side.   Pulmonary/Chest: Accessory muscle usage present. Tachypnea noted. She has decreased breath sounds. She has rales in the right lower field and the left lower field.   Abdominal: Soft. Bowel sounds are normal. She exhibits no distension. There is no tenderness.   Musculoskeletal: She exhibits edema (generalized non pitting).   Neurological: She is oriented to person, place, and time and easily aroused.   Skin: Skin is warm and dry. Capillary refill takes less  than 2 seconds.       Vents:  Oxygen Concentration (%): 80 (12/19/19 1412)    Lines/Drains/Airways     Drain            Female External Urinary Catheter 12/18/19 1100 1 day          Peripheral Intravenous Line                 Peripheral IV - Single Lumen 12/15/19 1617 22 G Right Hand 3 days         Midline Catheter Insertion/Assessment  - Single Lumen 12/19/19 1115 Right cephalic vein (lateral side of arm) 20g x 10cm less than 1 day                Significant Labs:    CBC/Anemia Profile:  Recent Labs   Lab 12/18/19  0333 12/19/19  0431   WBC 0.44* 2.02*   HGB 12.2 11.6*   HCT 37.5 34.9*   PLT 67* 54*   MCV 97 96   RDW 15.2* 15.3*        Chemistries:  Recent Labs   Lab 12/18/19  0333 12/19/19  0431    142   K 3.1* 2.8*    107   CO2 25 21*   BUN 16 20   CREATININE 0.6 0.7   CALCIUM 8.6* 8.5*       All pertinent labs within the past 24 hours have been reviewed.    Significant Imaging:   I have reviewed all pertinent imaging results/findings within the past 24 hours.      ABG  Recent Labs   Lab 12/19/19  0127   PH 7.545*   PO2 55*   PCO2 24.8*   HCO3 21.5*   BE -1     Assessment/Plan:     Pulmonary  Acute hypoxemic respiratory failure  I>>O per record since admission but output not being measured  LLL infiltrate on chest film concerning for pneumonia, cannot rule out aspiration with ongoing mucositis with dysphagia and GERD in immunocompromised patient  Optimize gas exchange with NIPPV  Re-addressed care wishes with pt and [place]; we will honor her wishes for no intubation given stage III CA with complication after first chemotherapy treatment  Keep NPO with AVAPS support    Cardiac/Vascular  Essential hypertension  Hold antihypertensives  ICU hemodynamic monitoring    Renal/  Hypokalemia  IV potassium replacement    Acute cystitis without hematuria  On levaquin day 5 for e.coli UTI  Plan escalation of abx for pneumonia concern with respiratory failure and hypotension    Hematology  Thrombocytopenia  No  evidence of bleeding  Monitor  Conservative transfusion plan    Oncology  Malignant neoplasm of ovary, left  Post initial TAXOL/CARBOPLATIN treatment with neutropenia, mucositis, UTI admission and now concern for pneumonia with shock  Holding any immunosuppressing meds  -Poor prognosis without treatment and treatment cannot be resumed until acute illness resolved and improved strength to tolerate chemo  -palliative care is following; current plan and wishes reviewed and we will continue to treat aggressively short of intubation along with providing care updates and support for decision making    Endocrine  Moderate malnutrition  NPO until pulmonary status improved    Diabetes mellitus  SSI prn with monitoring for glucose control and prevention of insulin toxicity    Other  Shock  Adequate MAP via arterial line  I>>O since admission and concern for fluid overload  Will check echo  Check lactate and obtain blood cultures then Escalate abx        Critical Care Daily Checklist:    A: Awake: RASS Goal/Actual Goal:    Actual:     B: Spontaneous Breathing Trial Performed?     C: SAT & SBT Coordinated?  n/a                      D: Delirium: CAM-ICU     E: Early Mobility Performed? Yes   F: Feeding Goal: Goals: Meet > 85 % EEN/EPN by JENNIFER [email or web address]   Status: Nutrition Goal Status: new   Current Diet Order   Procedures    Diet NPO Except for: Sips with Medication     Order Specific Question:   Except for     Answer:   Sips with Medication      AS: Analgesia/Sedation prn   T: Thromboembolic Prophylaxis thrombocytopenia   H: HOB > 300 Yes   U: Stress Ulcer Prophylaxis (if needed) pepcid   G: Glucose Control SSI prn   B: Bowel Function Stool Occurrence: 1   I: Indwelling Catheter (Lines & Neil) Necessity reviewed   D: De-escalation of Antimicrobials/Pharmacotherapies reviewed    Plan for the day/ETD As above    Code Status:  Family/Goals of Care: Partial Code, do not intubate  Pending hospital course   I have discussed case and plan  of care in detail with Dr Thomas and Dr Almendarez; Status and plan of care were discussed with team on multidisciplinary rounds.    Critical Care Time: 50 minutes  Critical secondary to acute hypoxemic respiratory failure; Critical care was time spent personally by me on the following activities: development of treatment plan with patient or surrogate and bedside caregivers, discussions with consultants, evaluation of patient's response to treatment, examination of patient, ordering and performing treatments and interventions, ordering and review of laboratory studies, ordering and review of radiographic studies, pulse oximetry, re-evaluation of patient's condition. This critical care time did not overlap with that of any other provider or involve time for any procedures.    Thank you for your consult.      TREE Asencio-BC  Critical Care Medicine  [facility] [place]

## 2019-12-19 NOTE — HPI
79 year old female with PMH including CAD post CABG 2011; DM2; HTN; HLD; arthritis; GERD; constipation; and stage III ovarian cancer (completed first cycle of TAXOL/CARBOPLATIN on 12/9/19)  Presented to ED on 12/15 with c/o sore throat, weakness, slurred speech  Admitted to OBS for neutropenia with mucositis  MRI completed s/t weakness and ?slurred speech, no acute findings  Neutropenia treated with granix and slowly improving  UTI, grew pan sensitive e.coli, now day 5 levaquin  Last 24 hours declining O2 sats with abg confirmed hypoxia and concern for pulmonary edema requiring NRB and IV lasix  Being transferred to ICU today for continuous NIPPV  Heme/onc and palliative medicine have been involved in care; surrogate decision maker is Prince (daughter), she and Ms Zhu have agreed on DNI status but still desire CPR/shocks/meds in event of arrest

## 2019-12-19 NOTE — CHAPLAIN
Provided ministries of listening, presence, and prayer.  Pt's daughter took time to talk about her mother's condition.  Pt wanted me to pray with them and I prayed before leaving and will follow up as needed.    Chaplain Brett Dubois M.Div., BCC

## 2019-12-19 NOTE — ASSESSMENT & PLAN NOTE
--Patient has received 1 dose of carboplatin/Taxol Chemotherapy 12/9/19 for treatment of her ovarian cancer.  Unfortunately this has been complicated by neutropenia, mucositis, UTI, and debility.  All chemotherapy will remain on hold pending resolution of current clinical situation.  Patient neutropenia, infection, functional status would need to improve significantly to proceed with any additional chemotherapy in the future.  Likely would need dose reduction if further chemotherapy is considered.  Palliative Care to see patient to discuss long-term care planning. For now will continue to treat patient's UTI and neutropenia.  Plans to discharge to skilled nursing facility in hopes of improving functional status.  Will continue Granix injections for 3 additional days - today will be day #4.  WBC slowly improving.  Would continue IV antibiotics until ANC >1000. Remained afebrile over past 24 hours.  Became hypoxic with  O2 sats of 85%.  Placed on Ventimask.  Chest xray showed left lower lobe infiltrate.  Abx changed to Levaquin IV.  Hemoglobin 11.6.  Platelets 54 K.  Monitor and transfuse if platelet count less than 10.  Potassium 2.8 with vomiting yesterday.  Replace per HM.    ?

## 2019-12-19 NOTE — PROGRESS NOTES
Advance Care Planning     Progress Note   Palliative Care        SUBJECTIVE:     History of Present Illness:  Patient seen and examined with her daughter Savanah at bedside. Ms. Zhu became more dyspneic overnight and Savanah says she started to choke on thick secretions. She reports they discussed code status options and Ms. Zhu did not wish to be intubated but was agreeable to chest compressions and defibrillation. Savanah became tearful explaining this decision and did not elaborate on her emotions but was open to our SW following up today. Ms. Zhu endorses continued mouth pain but reports it is improving, dry cough, dyspnea, nausea, and continued diarrhea.       In the last 24hrs she has received the following pain medications (7a-7a):  - Morphine 5mg PO - none used      Past Medical History:   Diagnosis Date    Arthritis     Diabetes mellitus, type 2     GERD (gastroesophageal reflux disease)     Hyperlipidemia     Hypertension     Uterine prolapse      Past Surgical History:   Procedure Laterality Date    BLADDER SUSPENSION      CORONARY ARTERY BYPASS GRAFT      x3    INTRAOCULAR LENS INSERTION      left eye     History reviewed. No pertinent family history.  Review of patient's allergies indicates:   Allergen Reactions    Lisinopril Swelling     Lips swelling    Penicillins Anaphylaxis    Sulfa (sulfonamide antibiotics) Hives       Medications:    Current Facility-Administered Medications:     acetaminophen tablet 650 mg, 650 mg, Oral, Q4H PRN, CLINTON CarolinaP-C    acetaminophen tablet 650 mg, 650 mg, Oral, Q6H PRN, CLINTON CarolinaP-C    albuterol-ipratropium 2.5 mg-0.5 mg/3 mL nebulizer solution 3 mL, 3 mL, Nebulization, Q4H PRN, Benita Grossman, NP, 3 mL at 12/19/19 0123    amLODIPine tablet 5 mg, 5 mg, Oral, Daily, CLINTON CarolinaP-C, 5 mg at 12/19/19 0838    aspirin tablet 325 mg, 325 mg, Oral, Daily, CLINTON CarolinaP-C, 325 mg at 12/19/19 0838     clopidogrel tablet 75 mg, 75 mg, Oral, Daily, CLINTON CarolinaP-C, 75 mg at 12/19/19 0838    dextrose 10% (D10W) Bolus, 12.5 g, Intravenous, PRN, Payam Almendarez MD    dextrose 10% (D10W) Bolus, 25 g, Intravenous, PRN, Payam Almendarez MD    famotidine tablet 20 mg, 20 mg, Oral, BID, CLINTON CarolinaP-C, 20 mg at 12/19/19 0838    fluticasone propionate 50 mcg/actuation nasal spray 50 mcg, 1 spray, Each Nostril, Daily, ELLA Carolina-C, 50 mcg at 12/19/19 0900    furosemide injection 20 mg, 20 mg, Intravenous, BID, ELLA Carolina-C    glucagon (human recombinant) injection 1 mg, 1 mg, Intramuscular, PRN, ELLA Carolina-C    glucose chewable tablet 16 g, 16 g, Oral, PRN, CLINTON CarolinaP-C    glucose chewable tablet 24 g, 24 g, Oral, PRN, CLINTON CarolinaP-C    insulin aspart U-100 pen 0-5 Units, 0-5 Units, Subcutaneous, QID (AC + HS) PRN, ELLA Carolina-C, 2 Units at 12/19/19 0530    levoFLOXacin 750 mg/150 mL IVPB 750 mg, 750 mg, Intravenous, Q24H, ELLA Carolina-C, Last Rate: 100 mL/hr at 12/18/19 0947, 750 mg at 12/18/19 0947    loratadine tablet 10 mg, 10 mg, Oral, Daily, Shannan Erwin, BRAD, 10 mg at 12/19/19 0900    magic mouthwash (diphenhydrAMINE 12.5 mg/5 mL 20 mL, aluminum & magnesium hydroxide-simethicone (MYLANTA) 20 mL, lidocaine HCl 2% (XYLOCAINE) 20 mL) solution, 15 mL, Swish & Spit, Q4H PRN, ELLA Carolina-C, 15 mL at 12/19/19 0905    magnesium sulfate 2g in water 50mL IVPB (premix), 2 g, Intravenous, Once, GERRI Simon, Last Rate: 0 mL/hr at 12/19/19 0800, 2 g at 12/19/19 0837    metoprolol succinate (TOPROL-XL) 24 hr split tablet 12.5 mg, 12.5 mg, Oral, BID, Benita Grossman, BRAD, 12.5 mg at 12/19/19 0838    morphine 100 mg/5 mL (20 mg/mL) concentrated solution 5 mg, 5 mg, Oral, Q4H PRN, Noris Renner PA-C    nitroGLYCERIN SL tablet 0.4 mg, 0.4 mg, Sublingual, Q5 Min PRN, Crissy GOMEZ  CHAN Wagner    ondansetron injection 8 mg, 8 mg, Intravenous, Q6H PRN, Joanne Ervin MD, 8 mg at 12/18/19 2014    potassium chloride 10 mEq in 100 mL IVPB, 10 mEq, Intravenous, Q1H, Payam Almendarez MD, Stopped at 12/19/19 0837    potassium chloride SA CR tablet 20 mEq, 20 mEq, Oral, TID, Payam Almendarez MD    prochlorperazine injection Soln 2.5 mg, 2.5 mg, Intravenous, Q4H PRN, GERRI Devlin-C    simvastatin tablet 20 mg, 20 mg, Oral, QHS, CHAN Carolina, 20 mg at 12/18/19 2016    sodium chloride 0.9% 1,000 mL with mvi, adult no.4 with vit K 3,300 unit- 150 mcg/10 mL 10 mL, thiamine 100 mg, folic acid 1 mg infusion, , Intravenous, Daily, Payam Almendarez MD, Last Rate: 50 mL/hr at 12/19/19 0837    sodium chloride 0.9% flush 10 mL, 10 mL, Intravenous, PRN, CHAN Carolina    tbo-filgrastim (GRANIX) injection 480 mcg/0.8 mL, 480 mcg, Subcutaneous, Daily, Shannan Erwin NP, 480 mcg at 12/19/19 0836        OBJECTIVE:   Symptom Assessment (ESAS 0-10 scale)     ESAS 0 1 2 3 4 5 6 7 8 9 10   Pain X             Dyspnea           X   Anxiety X             Nausea           X   Depression  X             Anorexia     X         Fatigue X             Insomnia X             Restlessness  X             Agitation X             Constipation    no  Bowel Management Plan (BMP): yes  Diarrhea        yes  Comments:       ROS:  Review of Systems   Constitutional: Positive for activity change and appetite change.   HENT: Positive for mouth sores and sore throat.    Respiratory: Positive for cough and shortness of breath. Negative for chest tightness.    Cardiovascular: Positive for leg swelling. Negative for chest pain.   Gastrointestinal: Positive for diarrhea, nausea and vomiting. Negative for abdominal pain and constipation.   Musculoskeletal: Negative for back pain.   Neurological: Positive for speech difficulty (slurred). Negative for weakness and headaches.   Psychiatric/Behavioral:  Positive for sleep disturbance. Negative for confusion. The patient is not nervous/anxious.        Physical Exam:  Vitals: Temp: 96.6 °F (35.9 °C) (12/19/19 0717)  Pulse: (!) 118 (12/19/19 0804)  Resp: (!) 22 (12/19/19 0804)  BP: 125/60 (12/19/19 0717)  SpO2: (!) 92 % (12/19/19 0804)    Gen: well-developed, well-nourished, moderate respiratory distress, alert, interactive with nonverbal responses, VM in place  Head: atraumatic, normocephalic  Eyes: conjuctiva and sclera clear  Ears: hearing grossly intact with no external abnormality  Mouth: + mucositis, edentulous  Respiratory: crackles noted to RLE, diminished in LLL, tachypnic  Heart: tachycardia  Abdomen: soft, NTTP, distended c/w gaseous distention, hypoactive BS  Pulses: 1+ in DP  Extremities: no edema  Skin: no rashes or lesions  Psych: minimally cooperative, depressed mood and affect, does not speak much    Labs:  Lab Results   Component Value Date    WBC 2.02 (L) 12/19/2019    HGB 11.6 (L) 12/19/2019    HCT 34.9 (L) 12/19/2019    MCV 96 12/19/2019    PLT 54 (L) 12/19/2019       BMP  Lab Results   Component Value Date     12/19/2019    K 2.8 (L) 12/19/2019     12/19/2019    CO2 21 (L) 12/19/2019    BUN 20 12/19/2019    CREATININE 0.7 12/19/2019    CALCIUM 8.5 (L) 12/19/2019    ANIONGAP 14 12/19/2019    ESTGFRAFRICA >60 12/19/2019    EGFRNONAA >60 12/19/2019       LFT:   Lab Results   Component Value Date    AST 88 (H) 12/15/2019    ALKPHOS 55 12/15/2019    BILITOT 1.4 (H) 12/15/2019       Albumin:   Albumin   Date Value Ref Range Status   12/15/2019 3.4 (L) 3.5 - 5.2 g/dL Final         Radiology:I have reviewed all pertinent imaging results/findings within the past 24 hours.    ASSESSMENT   Jennifer Zhu is a 79 y.o. year old with a history of ovarian cancer, GERD, DM 2, HTN, and hyperlipidemia who presented to the emergency department complaining of generalized weakness and sore throat. She has completed one cycle of chemotherapy and presented  with pancytopenia and mucositis. Palliative Care was consulted to assist with symptom management.      PLAN   1. Mucositis  - Continue morphine solution 5mg PO q4hr PRN pain  - Continue topical treatments     2. Encounter for Palliative Care  - Code status: PARTIAL- patient is opposed to intubation but wishes to have cardiac resuscitation (chemical code, compressions, and defibrillations)  - Surrogate: daughter Savanah Zhu  - AD on file is for the wrong patient, we requested a copy and will scan it into the computer but this is at home and Savanah has no intention of leaving her mother's side while admitted.     3. Stage III ovarian cancer  - Neoadjuvant chemotherapy planned with possible debulking surgery after. Given her functional decline and intolerance of the first round chemotherapy is on hold indefinitely. Savanah is still optimistic that she will improve with therapy and be able to resume chemotherapy.  - I would caution against extemporaneous goals of care discussions as aSvanah appears to be tiring from the topic and deferring daily. I will continue to follow along and assist when appropriate or if there is a status change.     4. Chronic constipation  - Currently having diarrhea so I stopped the bowel regimen    5. Intractable nausea and vomiting  - Continue Zofran 8mg IV q6hr PRN  - Continue Compazine 2.5mg q4hr PRN nausea and vomiting refractory to Zofran    6. Acute hypoxic respiratory failure  - Code status discussed with primary team- she elected to be PARTIAL code as above.  - Defer to primary, concerning for acute decompensation.          Thank you for allowing Palliative care to be involved in the care of Jennifer Zhu.        Medical decision making: HIGH based on high risk of death, untreated symptoms, high risk medications, poor prognosis, management of more than one chronic illness in exacerbation, managing side effects of medications or polypharmacy.      Plan required increased review of  medication choice, interaction, dosing, frequency, and route due to patient complexity. Patient complexity increased by: At risk for delirium, Presence of advanced age, Patient is poor historian     > 50% of 35 min visit spent in chart review, face to face discussion of goals of care, symptom assessment, coordination of care and emotional support, formulating and communicating goals of care, exploring burden/ benefit of various approaches of treatment.          Noris Renner PA-C  Palliative Care

## 2019-12-19 NOTE — CARE UPDATE
RT walked into room to do oxygen check and IS with patient. She was on 50% venti mask with spo2 85%. Tried to place patient on high flow nasal cannula at 15L,, but spo2 only came up to 87% at the highest. Then placed on 100% NRB and spo2 92%. Informed patient's nurse, Maryana of findings and also messaged Dr. Almendarez.

## 2019-12-19 NOTE — SIGNIFICANT EVENT
Received deterioration alert on patient. Discussed with Primary Team who examined patient with plans to discuss palliative care with patient and family. No need for a change in level of care at this time.

## 2019-12-19 NOTE — ASSESSMENT & PLAN NOTE
Adequate MAP via arterial line  I>>O since admission and concern for fluid overload  Will check echo  Check lactate and obtain blood cultures then Escalate abx

## 2019-12-19 NOTE — PT/OT/SLP PROGRESS
Physical Therapy      Patient Name:  Jennifer Zhu   MRN:  [id number]    0744 P.T. COMPLETED CHART REVIEW AND SPOKE WITH NURSE ISAÍAS. PT ON ELIAZAR MASK AND HAS BEEN NAUSEATED AND VOMITING WITH MOVEMENT. PT IS NOT APPROPRIATE AT THIS TIME FOR P.T. TX. P.T. TO CHECK ON PT NEXT VISIT. THANK YOU.     Taylor Iglesias, PT,12/19/2019

## 2019-12-19 NOTE — PLAN OF CARE
"Patient transferred to ICU, placed on Bipap. Alert and oriented. Blood pressure reading low, with good radial pulse present. Arterial Line inserted for more accurate blood pressure reading. Daughter at bedside and updated on plan of care. Patient has been made a "DNI", but is otherwise a full code.   "

## 2019-12-19 NOTE — ASSESSMENT & PLAN NOTE
I>>O per record since admission but output not being measured  LLL infiltrate on chest film concerning for pneumonia, cannot rule out aspiration with ongoing mucositis with dysphagia and GERD in immunocompromised patient  Optimize gas exchange with NIPPV  Re-addressed care wishes with pt and [place]; we will honor her wishes for no intubation given stage III CA with complication after first chemotherapy treatment  Keep NPO with AVAPS support

## 2019-12-19 NOTE — PROCEDURES
"Jennifer Zhu is a 79 y.o. female patient.    Temp: 97.9 °F (36.6 °C) (12/19/19 1137)  Pulse: 110 (12/19/19 1255)  Resp: (!) 41 (12/19/19 1255)  BP: (!) 125/57 (12/19/19 1137)  SpO2: (!) 91 % (12/19/19 1255)  Weight: 88.2 kg (194 lb 6.4 oz) (12/15/19 2130)  Height: 5' 5" (165.1 cm) (12/15/19 2130)       Arterial Line  Date/Time: 12/19/2019 2:50 PM  Location procedure was performed: [facility] INTENSIVE CARE UNIT  Performed by: CLAUS Asencio  Authorized by: CLAUS Asencio   Pre-op Diagnosis: shock  Post-operative diagnosis: shock  Consent Done: Not Needed  Preparation: Patient was prepped and draped in the usual sterile fashion.  Indications: hemodynamic monitoring  Location: left radial  Patient sedated: no  Noble's test normal: yes  Needle gauge: 20  Seldinger technique: Seldinger technique used  Number of attempts: 1  Complications: No  Specimens: No  Implants: No  Post-procedure: dressing applied (secured with mastisol and steri strips; biopatch and clear occlusive dressing applied)  Post-procedure CMS: unchanged  Patient tolerance: Patient tolerated the procedure well with no immediate complications          Shayna Dickey  12/19/2019  "

## 2019-12-19 NOTE — PROGRESS NOTES
[facility] -   Hematology/Oncology  Progress Note    Patient Name: Jennifer Zhu  Admission Date: 12/15/2019  Hospital Length of Stay: 3 days  Code Status: Partial Code     Subjective:     HPI:   78 y/o female with PMHx of DM, GERD, HLD, HTN, and ovarian Ca that presented to the ED with c/o sore throat that onset gradually last night. She is a chemo patient of Dr. Ervin.  Associated symptoms include generalized weakness and slurred speech. Pt was too weak to ambulate. Symptoms are constant and moderate inseverity. No mitigating or exacerbating factors reported. Pt denies fever, chills, N/V/D, abd pain, congestion, dizziness, and all other symptoms at his time.   She is a full code and her SDM is her daughter, Christina.  She also presented with acute slurred speech, and right sided facial drooping.    She will be kept on OBS for neutropenia with mucisutus under the care of Hospital     She received carboplatin/Taxol every three weeks - last treatment 12/9/19.      Interval History: Respiratory distress last night with decreased O2 saturations.  Currently on a venti mask.  Chest xray show left lower lobe infiltrated.  Daughter at bedside.     Oncology Treatment Plan:   OP GYN PACLITAXEL CARBOPLATIN (AUC) WEEKLY    Medications:  Continuous Infusions:    Scheduled Meds:   amLODIPine  5 mg Oral Daily    aspirin  325 mg Oral Daily    clopidogrel  75 mg Oral Daily    famotidine  20 mg Oral BID    fluticasone propionate  1 spray Each Nostril Daily    furosemide  20 mg Intravenous BID    levoFLOXacin  750 mg Intravenous Q24H    loratadine  10 mg Oral Daily    metoprolol succinate  12.5 mg Oral BID    potassium chloride  10 mEq Intravenous Q1H    potassium chloride  20 mEq Oral TID    simvastatin  20 mg Oral QHS    Banana bag   Intravenous Daily    tbo-filgrastim  480 mcg Subcutaneous Daily     PRN Meds:acetaminophen, acetaminophen, albuterol-ipratropium, Dextrose 10% Bolus, Dextrose 10% Bolus,  glucagon (human recombinant), glucose, glucose, insulin aspart U-100, magic mouthwash (diphenhydrAMINE 12.5 mg/5 mL 20 mL, aluminum & magnesium hydroxide-simethicone (MYLANTA) 20 mL, lidocaine HCl 2% (XYLOCAINE) 20 mL) solution, morphine, nitroGLYCERIN, ondansetron, prochlorperazine, sodium chloride 0.9%     Review of Systems   Unable to perform ROS: Acuity of condition     Objective:     Vital Signs (Most Recent):  Temp: 96.6 °F (35.9 °C) (12/19/19 0717)  Pulse: (!) 118 (12/19/19 0804)  Resp: (!) 22 (12/19/19 0804)  BP: 125/60 (12/19/19 0717)  SpO2: (!) 92 % (12/19/19 0804) Vital Signs (24h Range):  Temp:  [96.6 °F (35.9 °C)-99.3 °F (37.4 °C)] 96.6 °F (35.9 °C)  Pulse:  [] 118  Resp:  [14-28] 22  SpO2:  [85 %-95 %] 92 %  BP: (106-130)/(56-65) 125/60     Weight: 88.2 kg (194 lb 6.4 oz)  Body mass index is 32.35 kg/m².  Body surface area is 2.01 meters squared.      Intake/Output Summary (Last 24 hours) at 12/19/2019 1041  Last data filed at 12/18/2019 1707  Gross per 24 hour   Intake 976.67 ml   Output 250 ml   Net 726.67 ml       Physical Exam   Constitutional: She appears well-developed and well-nourished. She has a sickly appearance. She appears ill. She appears distressed. Face mask in place.   HENT:   Head: Normocephalic and atraumatic.   Nose: Nose normal.   Mouth/Throat: Oropharynx is clear and moist.   Eyes: Pupils are equal, round, and reactive to light. EOM are normal. Right eye exhibits no discharge. Left eye exhibits no discharge.   Neck: Normal range of motion. Neck supple.   Cardiovascular: Normal rate, regular rhythm, normal heart sounds and intact distal pulses. Exam reveals no gallop and no friction rub.   No murmur heard.  Pulmonary/Chest: Tachypnea noted. She is in respiratory distress. She has no wheezes. She has rales. She exhibits no tenderness.   Abdominal: Soft. Bowel sounds are normal. She exhibits no distension.   Genitourinary:   Genitourinary Comments: U/a shows + nitrite, many  bacteria   Musculoskeletal: Normal range of motion. She exhibits no edema, tenderness or deformity.   Neurological: She is alert. She displays tremor. A cranial nerve deficit is present. She exhibits abnormal muscle tone. Coordination abnormal.   Skin: Skin is warm and dry. Capillary refill takes less than 2 seconds. No rash noted. She is not diaphoretic. No erythema. No pallor.   Psychiatric: She is attentive.   Nursing note and vitals reviewed.      Significant Labs:   CBC:   Recent Labs   Lab 12/18/19  0333 12/19/19  0431   WBC 0.44* 2.02*   HGB 12.2 11.6*   HCT 37.5 34.9*   PLT 67* 54*   , CMP:   Recent Labs   Lab 12/18/19 0333 12/19/19  0431    142   K 3.1* 2.8*    107   CO2 25 21*   * 203*   BUN 16 20   CREATININE 0.6 0.7   CALCIUM 8.6* 8.5*   ANIONGAP 11 14   EGFRNONAA >60 >60   , Coagulation: No results for input(s): PT, INR, APTT in the last 48 hours., LDH: No results for input(s): LDHCSF, BFSOURCE in the last 48 hours., LFTs:   No results for input(s): ALT, AST, ALKPHOS, BILITOT, PROT, ALBUMIN in the last 48 hours., Reticulocytes: No results for input(s): RETIC in the last 48 hours., Tumor Markers: No results for input(s): PSA, CEA, , AFPTM, NF0101,  in the last 48 hours.    Invalid input(s): ALGTM, Urine Studies:   No results for input(s): COLORU, APPEARANCEUA, PHUR, SPECGRAV, PROTEINUA, GLUCUA, KETONESU, BILIRUBINUA, OCCULTUA, NITRITE, UROBILINOGEN, LEUKOCYTESUR, RBCUA, WBCUA, BACTERIA, SQUAMEPITHEL, HYALINECASTS in the last 48 hours.    Invalid input(s): WRIGHTSUR and All pertinent labs from the last 24 hours have been reviewed.    Diagnostic Results:  I have reviewed all pertinent imaging results/findings within the past 24 hours.    Assessment/Plan:     * Neutropenia associated with mucositis  December 18, 2019  --neutropenia slowly improving.  Completed dose 3/3 Granix today  --reports mucositis improving.  Continue Magic mouthwash  --discussed with patient irritants to  avoid  --await ANC > 1000  --monitor S&S infection  --Supportive care    Thrombocytopenia  Platelets currently 66 K.  No need for transfusion at this time.  Related to recent chemotherapy  --CBC daily  --Transfuse for platelets <10 or overt bleeding    Acute cystitis without hematuria  Continue abx for treatment of gram negative luis alfredo UTI per HM - currently on Levaquin IV    Malignant neoplasm of ovary, left  --Patient has received 1 dose of carboplatin/Taxol Chemotherapy 12/9/19 for treatment of her ovarian cancer.  Unfortunately this has been complicated by neutropenia, mucositis, UTI, and debility.  All chemotherapy will remain on hold pending resolution of current clinical situation.  Patient neutropenia, infection, functional status would need to improve significantly to proceed with any additional chemotherapy in the future.  Likely would need dose reduction if further chemotherapy is considered.  Palliative Care to see patient to discuss long-term care planning. For now will continue to treat patient's UTI and neutropenia.  Plans to discharge to skilled nursing facility in hopes of improving functional status.  Will continue Granix injections for 3 additional days - today will be day #4.  WBC slowly improving.  Would continue IV antibiotics until ANC >1000. Remained afebrile over past 24 hours.  Became hypoxic with  O2 sats of 85%.  Placed on Ventimask.  Chest xray showed left lower lobe infiltrate.  Abx changed to Levaquin IV.  Hemoglobin 11.6.  Platelets 54 K.  Monitor and transfuse if platelet count less than 10.  Potassium 2.8 with vomiting yesterday.  Replace per .    ?         Thank you for your consult. I will follow-up with patient. Please contact us if you have any additional questions.     Shannan Erwin NP  Hematology/Oncology  [facility]

## 2019-12-19 NOTE — ASSESSMENT & PLAN NOTE
Post initial TAXOL/CARBOPLATIN treatment with neutropenia, mucositis, UTI admission and now concern for pneumonia with shock  Holding any immunosuppressing meds  -Poor prognosis without treatment and treatment cannot be resumed until acute illness resolved and improved strength to tolerate chemo  -palliative care is following; current plan and wishes reviewed and we will continue to treat aggressively short of intubation along with providing care updates and support for decision making

## 2019-12-19 NOTE — ASSESSMENT & PLAN NOTE
On levaquin day 5 for e.coli UTI  Plan escalation of abx for pneumonia concern with respiratory failure and hypotension

## 2019-12-19 NOTE — SUBJECTIVE & OBJECTIVE
Past Medical History:   Diagnosis Date    Arthritis     Diabetes mellitus, type 2     GERD (gastroesophageal reflux disease)     Hyperlipidemia     Hypertension     Uterine prolapse        Past Surgical History:   Procedure Laterality Date    BLADDER SUSPENSION      CORONARY ARTERY BYPASS GRAFT      x3    INTRAOCULAR LENS INSERTION      left eye       Review of patient's allergies indicates:   Allergen Reactions    Lisinopril Swelling     Lips swelling    Penicillins Anaphylaxis    Sulfa (sulfonamide antibiotics) Hives       Family History     None        Tobacco Use    Smoking status: Never Smoker    Smokeless tobacco: Never Used   Substance and Sexual Activity    Alcohol use: No    Drug use: No    Sexual activity: Not Currently         Review of Systems   Reason unable to perform ROS: limited s/t work of breathing.     Objective:     Vital Signs (Most Recent):  Temp: 97.9 °F (36.6 °C) (12/19/19 1137)  Pulse: (!) 53 (12/19/19 1430)  Resp: (!) 43 (12/19/19 1430)  BP: (!) 70/52 (12/19/19 1345)  SpO2: (!) 94 % (12/19/19 1430) Vital Signs (24h Range):  Temp:  [96.6 °F (35.9 °C)-99.3 °F (37.4 °C)] 97.9 °F (36.6 °C)  Pulse:  [] 53  Resp:  [14-49] 43  SpO2:  [81 %-95 %] 94 %  BP: ()/() 70/52     Weight: 88.2 kg (194 lb 6.4 oz)  Body mass index is 32.35 kg/m².      Intake/Output Summary (Last 24 hours) at 12/19/2019 1438  Last data filed at 12/19/2019 1400  Gross per 24 hour   Intake 1710.84 ml   Output 750 ml   Net 960.84 ml       Physical Exam   Constitutional: She is oriented to person, place, and time. She is sleeping and cooperative. She is easily aroused. She has a sickly appearance. She appears ill.   HENT:   Head: Atraumatic.   Eyes: Conjunctivae are normal.   Neck: No tracheal deviation present.   Cardiovascular: An irregularly irregular rhythm present. Tachycardia present.   Pulses:       Radial pulses are 2+ on the right side, and 2+ on the left side.        Dorsalis pedis  pulses are 1+ on the right side, and 1+ on the left side.   Pulmonary/Chest: Accessory muscle usage present. Tachypnea noted. She has decreased breath sounds. She has rales in the right lower field and the left lower field.   Abdominal: Soft. Bowel sounds are normal. She exhibits no distension. There is no tenderness.   Musculoskeletal: She exhibits edema (generalized non pitting).   Neurological: She is oriented to person, place, and time and easily aroused.   Skin: Skin is warm and dry. Capillary refill takes less than 2 seconds.       Vents:  Oxygen Concentration (%): 80 (12/19/19 1412)    Lines/Drains/Airways     Drain            Female External Urinary Catheter 12/18/19 1100 1 day          Peripheral Intravenous Line                 Peripheral IV - Single Lumen 12/15/19 1617 22 G Right Hand 3 days         Midline Catheter Insertion/Assessment  - Single Lumen 12/19/19 1115 Right cephalic vein (lateral side of arm) 20g x 10cm less than 1 day                Significant Labs:    CBC/Anemia Profile:  Recent Labs   Lab 12/18/19  0333 12/19/19  0431   WBC 0.44* 2.02*   HGB 12.2 11.6*   HCT 37.5 34.9*   PLT 67* 54*   MCV 97 96   RDW 15.2* 15.3*        Chemistries:  Recent Labs   Lab 12/18/19  0333 12/19/19  0431    142   K 3.1* 2.8*    107   CO2 25 21*   BUN 16 20   CREATININE 0.6 0.7   CALCIUM 8.6* 8.5*       All pertinent labs within the past 24 hours have been reviewed.    Significant Imaging:   I have reviewed all pertinent imaging results/findings within the past 24 hours.

## 2019-12-19 NOTE — CARE UPDATE
Patient noted resting on NPV with settings verified with NP at this time, see flowsheet for settings. Will continue to monitor.

## 2019-12-19 NOTE — SUBJECTIVE & OBJECTIVE
Interval History: Patient with increased SOB overnight - now requiring non-rebreather still unable to maintain sats - will put on BiPaP and transfer to ICU. Had discussion with patient and daughter about code status - pt wants cpr but no intubation and no ventilator. IV furosemide 20mg BID and monitor closely. Dr. Almendarez at bedside. Patient with n/v - unable to keep K+ down yesterday.    Review of Systems   Constitutional: Positive for fatigue. Negative for activity change, appetite change, chills and fever.   HENT: Positive for sore throat. Negative for congestion, dental problem, ear pain, hearing loss, mouth sores, sinus pressure, tinnitus and trouble swallowing.    Eyes: Negative for pain, discharge, redness and visual disturbance.   Respiratory: Positive for shortness of breath. Negative for apnea, cough, choking, chest tightness and wheezing.    Cardiovascular: Negative for chest pain, palpitations and leg swelling.   Gastrointestinal: Negative for abdominal distention, abdominal pain, anal bleeding, blood in stool, constipation, diarrhea, nausea, rectal pain and vomiting.   Endocrine: Negative for cold intolerance, heat intolerance, polydipsia and polyuria.   Genitourinary: Negative for difficulty urinating, dysuria, flank pain, frequency, hematuria and urgency.   Musculoskeletal: Positive for gait problem. Negative for arthralgias, back pain, joint swelling, myalgias, neck pain and neck stiffness.   Skin: Negative for color change, rash and wound.   Allergic/Immunologic: Positive for immunocompromised state. Negative for food allergies.   Neurological: Positive for weakness. Negative for dizziness, tremors, seizures, syncope, speech difficulty, light-headedness and headaches.   Hematological: Negative for adenopathy. Does not bruise/bleed easily.   Psychiatric/Behavioral: Negative for agitation, behavioral problems, confusion and sleep disturbance. The patient is not nervous/anxious.    All other systems  reviewed and are negative.    Objective:     Vital Signs (Most Recent):  Temp: 96.6 °F (35.9 °C) (12/19/19 0717)  Pulse: (!) 118 (12/19/19 0804)  Resp: (!) 22 (12/19/19 0804)  BP: 125/60 (12/19/19 0717)  SpO2: (!) 92 % (12/19/19 0804) Vital Signs (24h Range):  Temp:  [96.6 °F (35.9 °C)-99.3 °F (37.4 °C)] 96.6 °F (35.9 °C)  Pulse:  [] 118  Resp:  [14-28] 22  SpO2:  [86 %-95 %] 92 %  BP: (106-130)/(56-65) 125/60     Weight: 88.2 kg (194 lb 6.4 oz)  Body mass index is 32.35 kg/m².    Intake/Output Summary (Last 24 hours) at 12/19/2019 0814  Last data filed at 12/18/2019 1707  Gross per 24 hour   Intake 976.67 ml   Output 250 ml   Net 726.67 ml      Physical Exam   Constitutional: She is oriented to person, place, and time. She appears well-developed and well-nourished. No distress.   HENT:   Head: Normocephalic and atraumatic.   Nose: Nose normal.   Eyes: Pupils are equal, round, and reactive to light. EOM are normal. Right eye exhibits no discharge. Left eye exhibits no discharge.   Neck: Normal range of motion. Neck supple. No JVD present. No tracheal deviation present. No thyromegaly present.   Cardiovascular: Normal rate, regular rhythm, normal heart sounds and intact distal pulses. Exam reveals no gallop and no friction rub.   No murmur heard.  Pulmonary/Chest: Effort normal. No respiratory distress. She has no wheezes. She has rales. She exhibits no tenderness.   Abdominal: Soft. Bowel sounds are normal. She exhibits no distension and no mass. There is no tenderness.   Genitourinary:   Genitourinary Comments: U/a shows + nitrite, many bacteria   Musculoskeletal: Normal range of motion. She exhibits edema (trace to ble ). She exhibits no tenderness or deformity.   Neurological: She is alert and oriented to person, place, and time. No cranial nerve deficit. She exhibits normal muscle tone. Coordination normal.   Skin: Skin is warm and dry. Capillary refill takes less than 2 seconds. No rash noted. She is not  diaphoretic. No erythema. No pallor.   Psychiatric: She has a normal mood and affect. Her behavior is normal.   Nursing note and vitals reviewed.      Significant Labs:   Recent Lab Results       12/19/19  0528   12/19/19  0431   12/19/19  0127   12/18/19  2111   12/18/19  1654        Allens Test     Pass         Anion Gap   14           Aniso   Slight           Basophil%   0.0           Site     RR         BUN, Bld   20           Calcium   8.5           Chloride   107           CO2   21           Creatinine   0.7           DelSys     Nasal Can         Differential Method   Manual           eGFR if    >60           eGFR if non    >60  Comment:  Calculation used to obtain the estimated glomerular filtration  rate (eGFR) is the CKD-EPI equation.              Eosinophil%   0.0           Flow     5         Glucose   203           Gran%   32.0           Hematocrit   34.9           Hemoglobin   11.6           Hypo   Occasional           Immature Grans (Abs)   CANCELED  Comment:  Mild elevation in immature granulocytes is non specific and   can be seen in a variety of conditions including stress response,   acute inflammation, trauma and pregnancy. Correlation with other   laboratory and clinical findings is essential.    Result canceled by the ancillary.             Immature Granulocytes   CANCELED  Comment:  Result canceled by the ancillary.           Lymph%   61.0           MCH   31.8           MCHC   33.2           MCV   96           Mode     SPONT         Mono%   7.0           MPV   12.4           nRBC   0           Ovalocytes   Occasional           Platelet Estimate   Decreased           Platelets   54           POC BE     -1         POC HCO3     21.5         POC PCO2     24.8         POC PH     7.545         POC PO2     55         POC SATURATED O2     92         POCT Glucose 201     151 204     Poik   Slight           Poly   Occasional           Potassium   2.8           RBC   3.65            RDW   15.3           Sample     ARTERIAL         Sodium   142           Stomatocytes   Present           Tear Drop Cells   Occasional           WBC   2.02                            12/18/19  1016        Allens Test       Anion Gap       Aniso       Basophil%       Site       BUN, Bld       Calcium       Chloride       CO2       Creatinine       DelSys       Differential Method       eGFR if        eGFR if non        Eosinophil%       Flow       Glucose       Gran%       Hematocrit       Hemoglobin       Hypo       Immature Grans (Abs)       Immature Granulocytes       Lymph%       MCH       MCHC       MCV       Mode       Mono%       MPV       nRBC       Ovalocytes       Platelet Estimate       Platelets       POC BE       POC HCO3       POC PCO2       POC PH       POC PO2       POC SATURATED O2       POCT Glucose 173     Poik       Poly       Potassium       RBC       RDW       Sample       Sodium       Stomatocytes       Tear Drop Cells       WBC           All pertinent labs within the past 24 hours have been reviewed.    Significant Imaging: I have reviewed all pertinent imaging results/findings within the past 24 hours.

## 2019-12-19 NOTE — SUBJECTIVE & OBJECTIVE
Interval History: Respiratory distress last night with decreased O2 saturations.  Currently on a venti mask.  Chest xray show left lower lobe infiltrated.  Daughter at bedside.     Oncology Treatment Plan:   OP GYN PACLITAXEL CARBOPLATIN (AUC) WEEKLY    Medications:  Continuous Infusions:    Scheduled Meds:   amLODIPine  5 mg Oral Daily    aspirin  325 mg Oral Daily    clopidogrel  75 mg Oral Daily    famotidine  20 mg Oral BID    fluticasone propionate  1 spray Each Nostril Daily    furosemide  20 mg Intravenous BID    levoFLOXacin  750 mg Intravenous Q24H    loratadine  10 mg Oral Daily    metoprolol succinate  12.5 mg Oral BID    potassium chloride  10 mEq Intravenous Q1H    potassium chloride  20 mEq Oral TID    simvastatin  20 mg Oral QHS    Banana bag   Intravenous Daily    tbo-filgrastim  480 mcg Subcutaneous Daily     PRN Meds:acetaminophen, acetaminophen, albuterol-ipratropium, Dextrose 10% Bolus, Dextrose 10% Bolus, glucagon (human recombinant), glucose, glucose, insulin aspart U-100, magic mouthwash (diphenhydrAMINE 12.5 mg/5 mL 20 mL, aluminum & magnesium hydroxide-simethicone (MYLANTA) 20 mL, lidocaine HCl 2% (XYLOCAINE) 20 mL) solution, morphine, nitroGLYCERIN, ondansetron, prochlorperazine, sodium chloride 0.9%     Review of Systems   Unable to perform ROS: Acuity of condition     Objective:     Vital Signs (Most Recent):  Temp: 96.6 °F (35.9 °C) (12/19/19 0717)  Pulse: (!) 118 (12/19/19 0804)  Resp: (!) 22 (12/19/19 0804)  BP: 125/60 (12/19/19 0717)  SpO2: (!) 92 % (12/19/19 0804) Vital Signs (24h Range):  Temp:  [96.6 °F (35.9 °C)-99.3 °F (37.4 °C)] 96.6 °F (35.9 °C)  Pulse:  [] 118  Resp:  [14-28] 22  SpO2:  [85 %-95 %] 92 %  BP: (106-130)/(56-65) 125/60     Weight: 88.2 kg (194 lb 6.4 oz)  Body mass index is 32.35 kg/m².  Body surface area is 2.01 meters squared.      Intake/Output Summary (Last 24 hours) at 12/19/2019 1041  Last data filed at 12/18/2019 1707  Gross per 24 hour    Intake 976.67 ml   Output 250 ml   Net 726.67 ml       Physical Exam   Constitutional: She appears well-developed and well-nourished. She has a sickly appearance. She appears ill. She appears distressed. Face mask in place.   HENT:   Head: Normocephalic and atraumatic.   Nose: Nose normal.   Mouth/Throat: Oropharynx is clear and moist.   Eyes: Pupils are equal, round, and reactive to light. EOM are normal. Right eye exhibits no discharge. Left eye exhibits no discharge.   Neck: Normal range of motion. Neck supple.   Cardiovascular: Normal rate, regular rhythm, normal heart sounds and intact distal pulses. Exam reveals no gallop and no friction rub.   No murmur heard.  Pulmonary/Chest: Tachypnea noted. She is in respiratory distress. She has no wheezes. She has rales. She exhibits no tenderness.   Abdominal: Soft. Bowel sounds are normal. She exhibits no distension.   Genitourinary:   Genitourinary Comments: U/a shows + nitrite, many bacteria   Musculoskeletal: Normal range of motion. She exhibits no edema, tenderness or deformity.   Neurological: She is alert. She displays tremor. A cranial nerve deficit is present. She exhibits abnormal muscle tone. Coordination abnormal.   Skin: Skin is warm and dry. Capillary refill takes less than 2 seconds. No rash noted. She is not diaphoretic. No erythema. No pallor.   Psychiatric: She is attentive.   Nursing note and vitals reviewed.      Significant Labs:   CBC:   Recent Labs   Lab 12/18/19  0333 12/19/19  0431   WBC 0.44* 2.02*   HGB 12.2 11.6*   HCT 37.5 34.9*   PLT 67* 54*   , CMP:   Recent Labs   Lab 12/18/19  0333 12/19/19  0431    142   K 3.1* 2.8*    107   CO2 25 21*   * 203*   BUN 16 20   CREATININE 0.6 0.7   CALCIUM 8.6* 8.5*   ANIONGAP 11 14   EGFRNONAA >60 >60   , Coagulation: No results for input(s): PT, INR, APTT in the last 48 hours., LDH: No results for input(s): LDHCSF, BFSOURCE in the last 48 hours., LFTs:   No results for input(s):  ALT, AST, ALKPHOS, BILITOT, PROT, ALBUMIN in the last 48 hours., Reticulocytes: No results for input(s): RETIC in the last 48 hours., Tumor Markers: No results for input(s): PSA, CEA, , AFPTM, CH1074,  in the last 48 hours.    Invalid input(s): ALGTM, Urine Studies:   No results for input(s): COLORU, APPEARANCEUA, PHUR, SPECGRAV, PROTEINUA, GLUCUA, KETONESU, BILIRUBINUA, OCCULTUA, NITRITE, UROBILINOGEN, LEUKOCYTESUR, RBCUA, WBCUA, BACTERIA, SQUAMEPITHEL, HYALINECASTS in the last 48 hours.    Invalid input(s): WRIGHTSUR and All pertinent labs from the last 24 hours have been reviewed.    Diagnostic Results:  I have reviewed all pertinent imaging results/findings within the past 24 hours.

## 2019-12-19 NOTE — PLAN OF CARE
Patient remains free from injury. Emesis x2 during this shift. Antiemetic administered. Patient O2 sat ranging 85-90. Increased O2 via nasal cannula to 5L. Sats did not increase. Abgs ordered. Patient now on venti mask with breathing treatments ordered.  Patient and patient daughter c/o patient gagging on saliva. Patient now NPO with meds.Swallow eval ordered. Will continue to monitor.

## 2019-12-19 NOTE — PT/OT/SLP PROGRESS
Occupational Therapy      Patient Name:  Jennifer Zhu   MRN:  [id number]   PT ON VENTI MASK AND HAS BEEN NAUSEATED AND VOMITING WITH MOVEMENT. PT IS NOT APPROPRIATE AT THIS TIME FOR O.T. TX. O.T. TO CHECK ON PT NEXT VISIT. PT ON HOLD PER NURSE ISAÍAS Metcalf, OT  12/19/2019   0900

## 2019-12-20 PROBLEM — R23.9 ALTERATION IN SKIN INTEGRITY: Status: RESOLVED | Noted: 2019-01-01 | Resolved: 2019-01-01

## 2019-12-20 PROBLEM — E87.6 HYPOKALEMIA: Status: RESOLVED | Noted: 2019-01-01 | Resolved: 2019-01-01

## 2019-12-20 PROBLEM — D70.9: Status: RESOLVED | Noted: 2019-01-01 | Resolved: 2019-01-01

## 2019-12-20 PROBLEM — K12.30: Status: RESOLVED | Noted: 2019-01-01 | Resolved: 2019-01-01

## 2019-12-20 PROBLEM — R53.1 WEAKNESS: Status: RESOLVED | Noted: 2019-01-01 | Resolved: 2019-01-01

## 2019-12-20 PROBLEM — Z51.5 ENCOUNTER FOR PALLIATIVE CARE: Status: RESOLVED | Noted: 2019-01-01 | Resolved: 2019-01-01

## 2019-12-20 PROBLEM — E44.0 MODERATE MALNUTRITION: Status: RESOLVED | Noted: 2019-01-01 | Resolved: 2019-01-01

## 2019-12-20 PROBLEM — K12.31 MUCOSITIS DUE TO ANTINEOPLASTIC THERAPY: Status: RESOLVED | Noted: 2019-01-01 | Resolved: 2019-01-01

## 2019-12-20 PROBLEM — J96.01 ACUTE HYPOXEMIC RESPIRATORY FAILURE: Status: RESOLVED | Noted: 2019-01-01 | Resolved: 2019-01-01

## 2019-12-20 PROBLEM — G89.3 NEOPLASM RELATED PAIN: Status: RESOLVED | Noted: 2019-01-01 | Resolved: 2019-01-01

## 2019-12-20 PROBLEM — D69.6 THROMBOCYTOPENIA: Status: RESOLVED | Noted: 2019-01-01 | Resolved: 2019-01-01

## 2019-12-20 PROBLEM — N30.00 ACUTE CYSTITIS WITHOUT HEMATURIA: Status: RESOLVED | Noted: 2019-01-01 | Resolved: 2019-01-01

## 2019-12-20 PROBLEM — K59.09 CHRONIC CONSTIPATION: Status: RESOLVED | Noted: 2019-01-01 | Resolved: 2019-01-01

## 2019-12-20 NOTE — PT/OT/SLP PROGRESS
Physical Therapy      Patient Name:  Jennifer Zhu   MRN:  [id number]    PT attempted tx at 8:30am, pt's Nurse Glenn reported to hold therapy at this time secondary to pt's medical status declining. PT will follow up at later time/ date.     Katerin Lujan, PT/OT   12/20/2019

## 2019-12-20 NOTE — ASSESSMENT & PLAN NOTE
I>>O per record since admission but output not being measured  LLL infiltrate on chest film concerning for pneumonia, cannot rule out aspiration with ongoing mucositis with dysphagia and GERD in immunocompromised patient  Optimize gas exchange with NIPPV  Re-addressed care wishes with pt and [place]; we will honor her wishes for no intubation given stage III CA with complication after first chemotherapy treatment  Keep NPO with AVAPS support  12/20 - failing aggressive NIPPV and has expressed desire not to be intubated; discussed with daughter, recommend transition to comfort care

## 2019-12-20 NOTE — ASSESSMENT & PLAN NOTE
Adequate MAP via arterial line  I>>O since admission and concern for fluid overload  Will check echo  Check lactate and obtain blood cultures then Escalate abx  12/20 - pressor added overnight, lactate continues rising

## 2019-12-20 NOTE — ASSESSMENT & PLAN NOTE
She is a DNR currently declining with respiratory failure - pulmonary edema and hypotension on levophed and is unresponsive on Bipap.  Family is at bedside and comfort care has been initiated.  Pastoral care consulted.

## 2019-12-20 NOTE — ASSESSMENT & PLAN NOTE
Continue abx for treatment of gram negative luis alfredo UTI per HM - on IV meropenem and vancomycin

## 2019-12-20 NOTE — PROGRESS NOTES
Advance Care Planning     Progress Note   Palliative Care        SUBJECTIVE:     History of Present Illness:  Patient seen and examined with her daughter Savanah at bedside. Ms. Zhu worsened overnight requiring continuous BiPAP and pressor support. She was unresponsive this morning and decided to change code status to DNR/ DNI after meeting with the ICU team. Savanah says that she has decided to transition to comfort focused care once her other family members arrive.       Past Medical History:   Diagnosis Date    Arthritis     Diabetes mellitus, type 2     GERD (gastroesophageal reflux disease)     Hyperlipidemia     Hypertension     Uterine prolapse      Past Surgical History:   Procedure Laterality Date    BLADDER SUSPENSION      CORONARY ARTERY BYPASS GRAFT      x3    INTRAOCULAR LENS INSERTION      left eye     History reviewed. No pertinent family history.  Review of patient's allergies indicates:   Allergen Reactions    Lisinopril Swelling     Lips swelling    Penicillins Anaphylaxis    Sulfa (sulfonamide antibiotics) Hives       Medications:    Current Facility-Administered Medications:     acetaminophen tablet 650 mg, 650 mg, Oral, Q4H PRN, CLINTON CarolinaP-C    acetaminophen tablet 650 mg, 650 mg, Oral, Q6H PRN, CLINTON CarolinaP-C    albuterol-ipratropium 2.5 mg-0.5 mg/3 mL nebulizer solution 3 mL, 3 mL, Nebulization, Q4H PRN, Benita Grossman, NP, 3 mL at 12/19/19 0123    amLODIPine tablet 5 mg, 5 mg, Oral, Daily, CLINTON CarolinaP-C, 5 mg at 12/19/19 0838    aspirin tablet 325 mg, 325 mg, Oral, Daily, CLINTON CarolinaP-C, 325 mg at 12/19/19 0838    clopidogrel tablet 75 mg, 75 mg, Oral, Daily, Crissy Wagner FNP-C, 75 mg at 12/19/19 0838    dextrose 10% (D10W) Bolus, 12.5 g, Intravenous, PRN, Payam Almendarez MD    dextrose 10% (D10W) Bolus, 25 g, Intravenous, PRN, Payam Almendarez MD    famotidine tablet 20 mg, 20 mg, Oral, BID, Crissy Wagner,  FNP-C, 20 mg at 12/19/19 0838    fluticasone propionate 50 mcg/actuation nasal spray 50 mcg, 1 spray, Each Nostril, Daily, CLINTON CarolinaP-C, 50 mcg at 12/19/19 0900    furosemide injection 20 mg, 20 mg, Intravenous, BID, ELLA Carolina-C    glucagon (human recombinant) injection 1 mg, 1 mg, Intramuscular, PRN, Crissy Wagner FNP-C    glucose chewable tablet 16 g, 16 g, Oral, PRN, CLINTON CarolinaP-C    glucose chewable tablet 24 g, 24 g, Oral, PRN, ELLA Carolina-C    insulin aspart U-100 pen 0-5 Units, 0-5 Units, Subcutaneous, QID (AC + HS) PRN, Crissy Wagner FNMARIELA-C, 2 Units at 12/19/19 0530    levoFLOXacin 750 mg/150 mL IVPB 750 mg, 750 mg, Intravenous, Q24H, ELLA Carolina-C, Last Rate: 100 mL/hr at 12/18/19 0947, 750 mg at 12/18/19 0947    loratadine tablet 10 mg, 10 mg, Oral, Daily, Shannan Erwin NP, 10 mg at 12/19/19 0900    magic mouthwash (diphenhydrAMINE 12.5 mg/5 mL 20 mL, aluminum & magnesium hydroxide-simethicone (MYLANTA) 20 mL, lidocaine HCl 2% (XYLOCAINE) 20 mL) solution, 15 mL, Swish & Spit, Q4H PRN, ELLA Carolina-C, 15 mL at 12/19/19 0905    magnesium sulfate 2g in water 50mL IVPB (premix), 2 g, Intravenous, Once, GERRI Simon, Last Rate: 0 mL/hr at 12/19/19 0800, 2 g at 12/19/19 0837    metoprolol succinate (TOPROL-XL) 24 hr split tablet 12.5 mg, 12.5 mg, Oral, BID, Benita Grossman NP, 12.5 mg at 12/19/19 0838    morphine 100 mg/5 mL (20 mg/mL) concentrated solution 5 mg, 5 mg, Oral, Q4H PRN, Noris Renner, SHERIF    nitroGLYCERIN SL tablet 0.4 mg, 0.4 mg, Sublingual, Q5 Min PRN, Crissy Wagner, CLINTONP-C    ondansetron injection 8 mg, 8 mg, Intravenous, Q6H PRN, Joanne Ervin MD, 8 mg at 12/18/19 2014    potassium chloride 10 mEq in 100 mL IVPB, 10 mEq, Intravenous, Q1H, Payam Almendarez MD, Stopped at 12/19/19 0837    potassium chloride SA CR tablet 20 mEq, 20 mEq, Oral, TID, Payam DAVILA  MD Erickson    prochlorperazine injection Soln 2.5 mg, 2.5 mg, Intravenous, Q4H PRN, Noris Renner PA-C    simvastatin tablet 20 mg, 20 mg, Oral, QHS, CLINTON CarolinaP-C, 20 mg at 12/18/19 2016    sodium chloride 0.9% 1,000 mL with mvi, adult no.4 with vit K 3,300 unit- 150 mcg/10 mL 10 mL, thiamine 100 mg, folic acid 1 mg infusion, , Intravenous, Daily, Payam Almendarez MD, Last Rate: 50 mL/hr at 12/19/19 0837    sodium chloride 0.9% flush 10 mL, 10 mL, Intravenous, PRN, ELLA Carolina-ZACKARY    tbo-filgrastim (GRANIX) injection 480 mcg/0.8 mL, 480 mcg, Subcutaneous, Daily, Shannan Erwin NP, 480 mcg at 12/19/19 0836        OBJECTIVE:   Symptom Assessment (ESAS 0-10 scale) unable to obtain    ESAS 0 1 2 3 4 5 6 7 8 9 10   Pain              Dyspnea              Anxiety              Nausea              Depression               Anorexia              Fatigue              Insomnia              Restlessness               Agitation              Constipation    no  Bowel Management Plan (BMP): yes  Diarrhea        yes  Comments:       ROS:  Review of Systems   Unable to perform ROS: Mental status change       Physical Exam:  Vitals: Temp: 96.6 °F (35.9 °C) (12/19/19 0717)  Pulse: (!) 118 (12/19/19 0804)  Resp: (!) 22 (12/19/19 0804)  BP: 125/60 (12/19/19 0717)  SpO2: (!) 92 % (12/19/19 0804)    Gen: well-developed, well-nourished, BiPAP in place, moderate respiratory distress, unresponsive  Head: atraumatic, normocephalic  Respiratory: coarse breath sounds, diminished in bases  Heart: tachycardic  Abdomen: soft, distended c/w gaseous distention, markedly hypoactive BS  Pulses: unable to palpate  Extremities: no edema  Skin: no rashes or lesions  Psych: unresponsive    Labs:  Lab Results   Component Value Date    WBC 10.30 12/20/2019    HGB 11.8 (L) 12/20/2019    HCT 33 (L) 12/20/2019    MCV 95 12/20/2019    PLT 63 (L) 12/20/2019     BMP  Lab Results   Component Value Date     12/20/2019    K  3.9 12/20/2019     12/20/2019    CO2 15 (L) 12/20/2019    BUN 33 (H) 12/20/2019    CREATININE 1.0 12/20/2019    CALCIUM 8.8 12/20/2019    ANIONGAP 16 12/20/2019    ESTGFRAFRICA >60 12/20/2019    EGFRNONAA 53 (A) 12/20/2019       Lab Results   Component Value Date    ALT 32 12/20/2019    AST 45 (H) 12/20/2019    ALKPHOS 116 12/20/2019    BILITOT 0.9 12/20/2019       Albumin:   Albumin   Date Value Ref Range Status   12/15/2019 3.4 (L) 3.5 - 5.2 g/dL Final         Radiology:I have reviewed all pertinent imaging results/findings within the past 24 hours.    ASSESSMENT   Jennifer Zhu is a 79 y.o. year old with a history of ovarian cancer, GERD, DM 2, HTN, and hyperlipidemia who presented to the emergency department complaining of generalized weakness and sore throat. She has completed one cycle of chemotherapy and presented with pancytopenia and mucositis. Palliative Care was consulted to assist with symptom management.      PLAN   1. Encounter for Palliative Care  - Code status: DNR/ comfort care  - Surrogate: jaron Pavon  - Details of family meeting in [facility]  - Primary outcome of family meeting is to transition to comfort focused care, remove BiPAP, and stop pressor support.  - Orders adjusted to reflect comfort care: all orders not intended solely for comfort have been discontinued (including labs, fluids, antibiotics) and comfort orders are placed.    2. Pain/ dyspnea/ anxiety/ agitations/ secretions  - Morphine 2mg IV q15 min PRN pain or dyspnea  - Ativan 0.5mg IV q30 min PRN agitation  - Haldol 2mg IV q4hr PRN agitation/ terminal delirium  - Glycopyrrolate 0.2mg IV q15 min PRN secretions       Thank you for allowing Palliative care to be involved in the care of Jennifer Zhu.        Medical decision making: HIGH based on high risk of death, untreated symptoms, high risk medications, poor prognosis, deescalation of treatments, management of more than one chronic illness in exacerbation.     Plan required  increased review of medication choice, interaction, dosing, frequency, and route due to patient complexity. Patient complexity increased by: At risk for delirium, Presence of advanced age, Patient is poor historian     > 50% of 60 min visit spent in chart review, face to face discussion of goals of care, symptom assessment, coordination of care and emotional support, formulating and communicating goals of care.         Noris Renner PA-C  Palliative Care

## 2019-12-20 NOTE — ASSESSMENT & PLAN NOTE
--Patient has received 1 dose of carboplatin/Taxol Chemotherapy 12/9/19 for treatment of her ovarian cancer.  Unfortunately this has been complicated by neutropenia, mucositis, UTI, and debility.  Was started on Granix x 4 days; however developed left lower lobe infiltrate, pulmonary edema, and respiratory failure.  She is currently non responsive, hypotensive on Levophed, on bipap with respirations in the 50's, HR in the 130's, and is non responsive.  Meropenem and Vancomycin per ICU team.  Comfort care initiated - DNR status.  Pastoral care consulted.  Family is at the bedside.

## 2019-12-20 NOTE — DISCHARGE SUMMARY
[facility]  Critical Care - Medicine  Discharge Summary      Patient Name: Jennifer Zhu  MRN: [id number]  Admission Date: 12/15/2019  Hospital Length of Stay: 4 days  Discharge Date and Time: 12/20/2019     Attending Physician: Aneesh Delacruz MD   Discharging Provider: Aneesh Delacruz MD  Primary Care Provider: Gómez Najera MD, MD  Reason for Admission: Neutropenia with Mucositis     HPI: 78 y/o female with PMHx of DM, GERD, HLD, HTN, and ovarian Ca that presented to the ED with c/o sore throat that onset gradually last night. She is a chemo patient of Dr. Ervin.  Associated symptoms include generalized weakness and slurred speech. Pt was too weak to ambulate. Symptoms are constant and moderate inseverity. No mitigating or exacerbating factors reported. Pt denies fever, chills, N/V/D, abd pain, congestion, dizziness, and all other symptoms at his time.   She is a full code and her SDM is her daughter, Christina.  She will be kept on OBS for neutropenia with mucisutus under the care of Hospital Medicine.    Indwelling Lines/Drains at Time of Discharge:   Lines/Drains/Airways     Drain            Female External Urinary Catheter 12/18/19 1100 1 day          Arterial Line                 Arterial Line 12/19/19 1500 Left Radial less than 1 day          Pressure Ulcer                 Pressure Injury 12/16/19 1000 Right Buttocks Stage 2 4 days                Hospital Course: Patient was kept on OBS for neutropenia associated with mucositis and UTI under the care of Hospital Medicine. She was given IV abx, magic mouthwash, and heme/onc was consulted. 12/16: speech is more slurred - discussed with Heme/onc - will get MRI to r/o stroke. Palliative care consulted for symptom management. Continue IV abx. And magic mouthwash. Failed obs, moved to IP 12/17: MRI showed no acute infarct or hemorrhage. ANC 0.0 receiving Granix daily X 3 days. Discussed with palliative care and heme/onc - will hold  chemotherapy for pt to go to SNF and get her strength back. Case management working on placement. : Patient with edema and rales today - getting IV fluids and furosemide was held on admit. Will get cxr and give IV furosemide. Had discussion with palliative care - chemotherapy on hold until pt is stronger - PT recommending SNF. Case management working on placement. Had N/V overnight - zofran increased and pt resting comfortably now.  19: Overnight patient had drop in BP, mental status worsened, placed on BiPAP and Levophed. Family at bedside decided on Comfort Care.   Patient  at 10:30AM, due to Cardiac Arrest 2/2 Hypoxemic Respiratory Failure 2/2 Pulmonary Edema 2/2 Malignant Neoplasm of Ovary.     Consults (From admission, onward)        Status Ordering Provider     Inpatient consult to Hematology/Oncology  Once     Provider:  Joanne Ervin MD    Acknowledged QUEENIE MAHER     Inpatient consult to Palliative Care  Once     Provider:  Noris Renner PA-C    Completed QUEENIE MAHER     Inpatient consult to Pulmonology  Once     Provider:  Lottie Thomas MD    Completed QUEENIE MAHER     Inpatient consult to Spiritual Care  Once     Provider:  (Not yet assigned)    Acknowledged MARTY CRUZ     Pharmacy to dose Vancomycin consult  Once     Provider:  (Not yet assigned)    Acknowledged FABRICIO HORTON          Significant Imaging:  I have reviewed and interpreted all pertinent imaging results/findings within the past 24 hours.    Pending Diagnostic Studies:     None        Final Active Diagnoses:    Diagnosis Date Noted POA    Peritoneal carcinomatosis [C78.6, C80.1] 2019 Yes    Malignant neoplasm of ovary, left [C56.2] 2019 Yes      Problems Resolved During this Admission:    Diagnosis Date Noted Date Resolved POA    PRINCIPAL PROBLEM:  Acute hypoxemic respiratory failure [J96.01] 2019 Yes    Shock [R57.9]  2019 No     Moderate malnutrition [E44.0] 2019 Yes    Encounter for palliative care [Z51.5] 2019 Not Applicable    Chronic constipation [K59.09] 2019 Yes    Mucositis due to antineoplastic therapy [K12.31] 2019 Yes    Neoplasm related pain [G89.3] 2019 Yes    Alteration in skin integrity [R23.9] 2019 Yes    Neutropenia associated with mucositis [D70.0] 12/15/2019 2019 Yes    Acute cystitis without hematuria [N30.00] 12/15/2019 2019 Yes    Thrombocytopenia [D69.6] 12/15/2019 2019 Yes    Weakness [R53.1] 12/15/2019 2019 Yes    Hypokalemia [E87.6] 12/15/2019 2019 Yes    Coronary artery disease involving native coronary artery without angina pectoris [I25.10] 2015 Yes    Essential hypertension [I10] 2014 Yes     Chronic    Diabetes mellitus [E11.9] 2014 Yes     Chronic       Discharged Condition:     Disposition:     Follow Up:  Follow-up Information     Joanne Ervin MD On 2019.    Specialty:  Hematology and Oncology  Why:  Return to the Emergency Room, If symptoms worsen  Contact information:  [address]  [place] [address]  [phone]                 Patient Instructions:   No discharge procedures on file.  Medications:  None (patient  at medical facility)    Aneesh Delacruz MD  Critical Care - Medicine  [facility]

## 2019-12-20 NOTE — EICU
eICU Physician Brief Note    Addendum 5:50 AM: bedside RN called with LA increasing at 6.9. Expected given that the patient was hypotensive. Unfortunately, Mrs Zhu's condition is deteriorating rapidly and her prognosis is dismal. Her daughter is now at the bedside with her.  Addendum 4:02 AM: patient more hypotensive now and poorly responsive. Ordered Levophed drip and will draw stat ABG. AM labs also to include lactate and CMP.  ABG 7.31/34/44 - metabolic acidosis with partial respiratory compensations, and hypoxemia. Increased PEEP from 6 to 10 and iTime from 1 sec to 1.1 sec.  Ordered STAT CxR and if pulmonary edema is worse will consider diuresing while maintaining her BP with pressors.      Called for hypotension. The patient was admitted for neutropenic infection. Trsf to ICU for NIPPV due to persistent hypoxemia. Rec'd lasix x1 for possible CHF, but CxR shows LLL infiltrate. Lungs overall clear per bedside RN.   Chart reviewed briefly. Labs and investigations reviewed.  Current medications reviewed.  A/P:  Septic shock.  Is on broad spectrum Abx.   mL x1 Re-eval if still hypotensive thereafter.

## 2019-12-20 NOTE — NURSING
0400 eICU button pressed in room due to pt becoming hypotensive and unresponsive; pt moves legs with sternal rub but unable to follow commands; SBP dropped to 50's. Dr. Dupree cameraed into room writing order for levophed. Levophed overrode so can be initiated.  After initiation of Levophed, pt's BP remains low per MD increase levophed to get adequate pressure.     1504 Charge notified pt's daughter and she is on her way up.  Pt dependent on Levophed unable to wean down.

## 2019-12-20 NOTE — PROGRESS NOTES
[facility] -   Hematology/Oncology  Progress Note    Patient Name: Jennifer Zhu  Admission Date: 12/15/2019  Hospital Length of Stay: 4 days  Code Status: DNR     Subjective:     HPI:   80 y/o female with PMHx of DM, GERD, HLD, HTN, and ovarian Ca that presented to the ED with c/o sore throat that onset gradually last night. She is a chemo patient of Dr. Ervin.  Associated symptoms include generalized weakness and slurred speech. Pt was too weak to ambulate. Symptoms are constant and moderate inseverity. No mitigating or exacerbating factors reported. Pt denies fever, chills, N/V/D, abd pain, congestion, dizziness, and all other symptoms at his time.   She is a full code and her SDM is her daughter, Christina.  She also presented with acute slurred speech, and right sided facial drooping.    She will be kept on OBS for neutropenia with mucisutus under the care of Hospital     She received carboplatin/Taxol every three weeks - last treatment 12/9/19.      Interval History:  In ICU on bipap - tachypnea with pulmonary edema , currently on levophed due to hypotension with tachycardia - HR in the 130's.  Increased lactic acid. Unresponsive.  Family at bedside.  Comfort care initiated.  Pastoral care consulted.    Oncology Treatment Plan:   OP GYN PACLITAXEL CARBOPLATIN (AUC) WEEKLY    Medications:  Continuous Infusions:   norepinephrine bitartrate-D5W 0.04 mcg/kg/min (12/20/19 0642)     Scheduled Meds:   aspirin  325 mg Oral Daily    clopidogrel  75 mg Oral Daily    famotidine (PF)  20 mg Intravenous BID    fluticasone propionate  1 spray Each Nostril Daily    meropenem (MERREM) IVPB  500 mg Intravenous Q6H    potassium chloride in water  20 mEq Intravenous Once    simvastatin  20 mg Oral QHS    Banana bag   Intravenous Daily    vancomycin (VANCOCIN) IVPB  2,250 mg Intravenous Q24H     PRN Meds:albuterol-ipratropium, magic mouthwash (diphenhydrAMINE 12.5 mg/5 mL 20 mL, aluminum & magnesium  hydroxide-simethicone (MYLANTA) 20 mL, lidocaine HCl 2% (XYLOCAINE) 20 mL) solution, morphine, nitroGLYCERIN, ondansetron, prochlorperazine, sodium chloride 0.9%     Review of Systems   Unable to perform ROS: Acuity of condition     Objective:     Vital Signs (Most Recent):  Temp: 98.4 °F (36.9 °C) (12/20/19 0715)  Pulse: (!) 137 (12/20/19 0824)  Resp: (!) 46 (12/20/19 0824)  BP: 118/68 (12/20/19 0804)  SpO2: (!) 92 % (12/20/19 0824) Vital Signs (24h Range):  Temp:  [97.5 °F (36.4 °C)-98.5 °F (36.9 °C)] 98.4 °F (36.9 °C)  Pulse:  [] 137  Resp:  [28-51] 46  SpO2:  [81 %-98 %] 92 %  BP: ()/() 118/68  Arterial Line BP: ()/(34-72) 120/54     Weight: 88.2 kg (194 lb 6.4 oz)  Body mass index is 32.35 kg/m².  Body surface area is 2.01 meters squared.      Intake/Output Summary (Last 24 hours) at 12/20/2019 0834  Last data filed at 12/20/2019 0600  Gross per 24 hour   Intake 3572.31 ml   Output 600 ml   Net 2972.31 ml       Physical Exam   Constitutional: She appears well-developed. She appears toxic. She has a sickly appearance. She appears ill. She appears distressed. Face mask in place.   HENT:   Head: Normocephalic and atraumatic.   Cardiovascular: Tachycardia present.   Pulmonary/Chest: Accessory muscle usage present. Tachypnea noted. She is in respiratory distress. She has rales. She exhibits no tenderness.   Abdominal: Soft. Normal appearance.   Genitourinary:   Genitourinary Comments: U/a shows + nitrite, many bacteria   Musculoskeletal: Normal range of motion. She exhibits edema (generalized).   Neurological: She is unresponsive.   Skin: Skin is warm and dry. Bruising noted. She is not diaphoretic. There is pallor.   Nursing note and vitals reviewed.      Significant Labs:   CBC:   Recent Labs   Lab 12/19/19  0431 12/20/19  0331 12/20/19  0403   WBC 2.02* 10.30  --    HGB 11.6* 11.8*  --    HCT 34.9* 35.6* 33*   PLT 54* 63*  --    , CMP:   Recent Labs   Lab 12/19/19  0431 12/19/19  1834  12/20/19  0440    139 140   K 2.8* 3.4* 3.9    108 109   CO2 21* 17* 15*   * 264* 240*   BUN 20 29* 33*   CREATININE 0.7 0.8 1.0   CALCIUM 8.5* 8.5* 8.8   PROT  --   --  5.5*   ALBUMIN  --   --  1.7*   BILITOT  --   --  0.9   ALKPHOS  --   --  116   AST  --   --  45*   ALT  --   --  32   ANIONGAP 14 14 16   EGFRNONAA >60 >60 53*   , Coagulation: No results for input(s): PT, INR, APTT in the last 48 hours., LDH: No results for input(s): LDHCSF, BFSOURCE in the last 48 hours., LFTs:   Recent Labs   Lab 12/20/19  0440   ALT 32   AST 45*   ALKPHOS 116   BILITOT 0.9   PROT 5.5*   ALBUMIN 1.7*   , Reticulocytes: No results for input(s): RETIC in the last 48 hours., Tumor Markers: No results for input(s): PSA, CEA, , AFPTM, [id number],  in the last 48 hours.    Invalid input(s): ALGTM, Urine Studies:   No results for input(s): COLORU, APPEARANCEUA, PHUR, SPECGRAV, PROTEINUA, GLUCUA, KETONESU, BILIRUBINUA, OCCULTUA, NITRITE, UROBILINOGEN, LEUKOCYTESUR, RBCUA, WBCUA, BACTERIA, SQUAMEPITHEL, HYALINECASTS in the last 48 hours.    Invalid input(s): WRIGHTSUR and All pertinent labs from the last 24 hours have been reviewed.    Diagnostic Results:  I have reviewed all pertinent imaging results/findings within the past 24 hours.    Assessment/Plan:     * Acute hypoxemic respiratory failure  She is a DNR currently declining with respiratory failure - pulmonary edema and hypotension on levophed and is unresponsive on Bipap.  Family is at bedside and comfort care has been initiated.  Pastoral care consulted.      Thrombocytopenia  Platelets currently 66 K.  No need for transfusion at this time.  Related to recent chemotherapy  --CBC daily  --Transfuse for platelets <10 or overt bleeding    Acute cystitis without hematuria  Continue abx for treatment of gram negative luis alfredo UTI per HM - on IV meropenem and vancomycin    Neutropenia associated with mucositis  December 18, 2019  --neutropenia slowly improving.   Completed dose 3/3 Granix today  --reports mucositis improving.  Continue Magic mouthwash  --discussed with patient irritants to avoid  --await ANC > 1000  --monitor S&S infection  --Supportive care    Malignant neoplasm of ovary, left  --Patient has received 1 dose of carboplatin/Taxol Chemotherapy 12/9/19 for treatment of her ovarian cancer.  Unfortunately this has been complicated by neutropenia, mucositis, UTI, and debility.  Was started on Granix x 4 days; however developed left lower lobe infiltrate, pulmonary edema, and respiratory failure.  She is currently non responsive, hypotensive on Levophed, on bipap with respirations in the 50's, HR in the 130's, and is non responsive.  Meropenem and Vancomycin per ICU team.  Comfort care initiated - DNR status.  Pastoral care consulted.  Family is at the bedside.          Thank you for your consult. I will follow-up with patient. Please contact us if you have any additional questions.     Shannan Erwin NP  Hematology/Oncology  [facility]

## 2019-12-20 NOTE — SUBJECTIVE & OBJECTIVE
Review of Systems   Unable to perform ROS: Patient unresponsive         Objective:     Vital Signs (Most Recent):  Temp: 98.5 °F (36.9 °C) (12/20/19 0300)  Pulse: (!) 148 (12/20/19 0630)  Resp: (!) 40 (12/20/19 0630)  BP: 118/68 (12/19/19 1608)  SpO2: 95 % (12/20/19 0630) Vital Signs (24h Range):  Temp:  [96.6 °F (35.9 °C)-98.5 °F (36.9 °C)] 98.5 °F (36.9 °C)  Pulse:  [] 148  Resp:  [22-51] 40  SpO2:  [81 %-98 %] 95 %  BP: ()/() 118/68  Arterial Line BP: ()/(34-72) 120/54     Weight: 88.2 kg (194 lb 6.4 oz)  Body mass index is 32.35 kg/m².      Intake/Output Summary (Last 24 hours) at 12/20/2019 0714  Last data filed at 12/20/2019 0600  Gross per 24 hour   Intake 3572.31 ml   Output 600 ml   Net 2972.31 ml       Physical Exam   Constitutional: She has a sickly appearance. She appears ill. Face mask in place.   HENT:   Head: Atraumatic.   Eyes: Conjunctivae are normal.   Neck: No tracheal deviation present.   Cardiovascular: An irregularly irregular rhythm present. Tachycardia present.   Pulses:       Radial pulses are 2+ on the right side, and 2+ on the left side.        Dorsalis pedis pulses are 1+ on the right side, and 1+ on the left side.   Pulmonary/Chest: Accessory muscle usage present. Tachypnea noted. She has decreased breath sounds. She has rales in the right lower field and the left lower field.   Abdominal: Soft. She exhibits no distension. Bowel sounds are decreased. There is no tenderness.   Musculoskeletal: She exhibits edema (generalized non pitting).   Neurological: She is unresponsive.   Skin: Skin is warm and dry. Capillary refill takes less than 2 seconds. No cyanosis.       Vents:  Oxygen Concentration (%): 100 (12/20/19 0600)    Lines/Drains/Airways     Drain            Female External Urinary Catheter 12/18/19 1100 1 day          Arterial Line                 Arterial Line 12/19/19 1500 Left Radial less than 1 day          Peripheral Intravenous Line                  Midline Catheter Insertion/Assessment  - Single Lumen 12/19/19 1115 Right cephalic vein (lateral side of arm) 20g x 10cm less than 1 day                Significant Labs:    CBC/Anemia Profile:  Recent Labs   Lab 12/19/19  0431 12/20/19  0331 12/20/19  0403   WBC 2.02* 10.30  --    HGB 11.6* 11.8*  --    HCT 34.9* 35.6* 33*   PLT 54* 63*  --    MCV 96 95  --    RDW 15.3* 15.5*  --         Chemistries:  Recent Labs   Lab 12/19/19  0431 12/19/19  1834 12/20/19  0440    139 140   K 2.8* 3.4* 3.9    108 109   CO2 21* 17* 15*   BUN 20 29* 33*   CREATININE 0.7 0.8 1.0   CALCIUM 8.5* 8.5* 8.8   ALBUMIN  --   --  1.7*   PROT  --   --  5.5*   BILITOT  --   --  0.9   ALKPHOS  --   --  116   ALT  --   --  32   AST  --   --  45*   MG  --  2.2  --    PHOS  --  1.8*  --        All pertinent labs within the past 24 hours have been reviewed.  ABG  Recent Labs   Lab 12/20/19  0403   PH 7.316*   PO2 44*   PCO2 34.3*   HCO3 17.5*   BE -9         Significant Imaging:  I have reviewed all pertinent imaging results/findings within the past 24 hours.

## 2019-12-20 NOTE — NURSING
Savanah Zhu, pt daughter called concerning pt change in status, advised to return to hospital, daughter coming.

## 2019-12-20 NOTE — PROGRESS NOTES
[facility] -   Critical Care Medicine  Progress Note    Patient Name: Jennifer Zhu  MRN: [id number]  Admission Date: 12/15/2019  Hospital Length of Stay: 4 days  Code Status: DNR  Attending Provider: Aneesh Delacruz MD  Primary Care Provider: Gómez Najera MD, MD   Principal Problem: Acute hypoxemic respiratory failure    Subjective:     HPI:  79 year old female with PMH including CAD post CABG 2011; DM2; HTN; HLD; arthritis; GERD; constipation; and stage III ovarian cancer (completed first cycle of TAXOL/CARBOPLATIN  on 12/9/19)  Presented to ED on 12/15 with c/o sore throat, weakness, slurred speech  Admitted to OBS for neutropenia with mucositis  MRI completed s/t weakness and ?slurred speech, no acute findings  Neutropenia treated with granix and slowly improving  UTI, grew pan sensitive e.coli, now day 5 levaquin  Last 24 hours declining O2 sats with abg confirmed hypoxia and concern for pulmonary edema requiring NRB and IV lasix  Being transferred to ICU today for continuous NIPPV  Heme/onc and palliative medicine have been involved in care; surrogate decision maker is Prince (daughter), she and Ms Zhu have agreed on DNI status but still desire CPR/shocks/meds in event of arrest    Hospital/ICU Course:  Examined on arrival to ICU; tachypnea 40-50 on BiPap 10/5 @80% with hypotension per NIBP  Arterial line placed to further evaluate BP; reading 105-120 systolic  12/20 - overnight decline with worsening hypoxia, increased work of breathing despite aggressive AVAPS support along with decreased responsiveness and hypotension refractory to IVF requiring pressor initiation  Ms Morrison (daughter) is at bedside and aware of decline; Dr Ervin and I discussed current status and likely impending full cardiopulmonary arrest along with code status and futility of code without intubation/mechanical ventilation in this situation, we expressed concern for Ms Zhu's comfort at this point s/t work of  breathing  Daughter agrees we should not attempt resuscitation with honoring Ms Zhu's desire for no intubation. We will provide some mild dyspnea treatment and continue support with NIPPV and pressor until additional family arrives as they are headed here now    Review of Systems   Unable to perform ROS: Patient unresponsive         Objective:     Vital Signs (Most Recent):  Temp: 98.5 °F (36.9 °C) (12/20/19 0300)  Pulse: (!) 148 (12/20/19 0630)  Resp: (!) 40 (12/20/19 0630)  BP: 118/68 (12/19/19 1608)  SpO2: 95 % (12/20/19 0630) Vital Signs (24h Range):  Temp:  [96.6 °F (35.9 °C)-98.5 °F (36.9 °C)] 98.5 °F (36.9 °C)  Pulse:  [] 148  Resp:  [22-51] 40  SpO2:  [81 %-98 %] 95 %  BP: ()/() 118/68  Arterial Line BP: ()/(34-72) 120/54     Weight: 88.2 kg (194 lb 6.4 oz)  Body mass index is 32.35 kg/m².      Intake/Output Summary (Last 24 hours) at 12/20/2019 0714  Last data filed at 12/20/2019 0600  Gross per 24 hour   Intake 3572.31 ml   Output 600 ml   Net 2972.31 ml       Physical Exam   Constitutional: She has a sickly appearance. She appears ill. Face mask in place.   HENT:   Head: Atraumatic.   Eyes: Conjunctivae are normal.   Neck: No tracheal deviation present.   Cardiovascular: An irregularly irregular rhythm present. Tachycardia present.   Pulses:       Radial pulses are 2+ on the right side, and 2+ on the left side.        Dorsalis pedis pulses are 1+ on the right side, and 1+ on the left side.   Pulmonary/Chest: Accessory muscle usage present. Tachypnea noted. She has decreased breath sounds. She has rales in the right lower field and the left lower field.   Abdominal: Soft. She exhibits no distension. Bowel sounds are decreased. There is no tenderness.   Musculoskeletal: She exhibits edema (generalized non pitting).   Neurological: She is unresponsive.   Skin: Skin is warm and dry. Capillary refill takes less than 2 seconds. No cyanosis.       Vents:  Oxygen Concentration (%): 100  (12/20/19 0600)    Lines/Drains/Airways     Drain            Female External Urinary Catheter 12/18/19 1100 1 day          Arterial Line                 Arterial Line 12/19/19 1500 Left Radial less than 1 day          Peripheral Intravenous Line                 Midline Catheter Insertion/Assessment  - Single Lumen 12/19/19 1115 Right cephalic vein (lateral side of arm) 20g x 10cm less than 1 day                Significant Labs:    CBC/Anemia Profile:  Recent Labs   Lab 12/19/19  0431 12/20/19  0331 12/20/19  0403   WBC 2.02* 10.30  --    HGB 11.6* 11.8*  --    HCT 34.9* 35.6* 33*   PLT 54* 63*  --    MCV 96 95  --    RDW 15.3* 15.5*  --         Chemistries:  Recent Labs   Lab 12/19/19  0431 12/19/19  1834 12/20/19  0440    139 140   K 2.8* 3.4* 3.9    108 109   CO2 21* 17* 15*   BUN 20 29* 33*   CREATININE 0.7 0.8 1.0   CALCIUM 8.5* 8.5* 8.8   ALBUMIN  --   --  1.7*   PROT  --   --  5.5*   BILITOT  --   --  0.9   ALKPHOS  --   --  116   ALT  --   --  32   AST  --   --  45*   MG  --  2.2  --    PHOS  --  1.8*  --        All pertinent labs within the past 24 hours have been reviewed.  ABG  Recent Labs   Lab 12/20/19  0403   PH 7.316*   PO2 44*   PCO2 34.3*   HCO3 17.5*   BE -9         Significant Imaging:  I have reviewed all pertinent imaging results/findings within the past 24 hours.      ABG  Recent Labs   Lab 12/20/19  0403   PH 7.316*   PO2 44*   PCO2 34.3*   HCO3 17.5*   BE -9     Assessment/Plan:     Pulmonary  * Acute hypoxemic respiratory failure  I>>O per record since admission but output not being measured  LLL infiltrate on chest film concerning for pneumonia, cannot rule out aspiration with ongoing mucositis with dysphagia and GERD in immunocompromised patient  Optimize gas exchange with NIPPV  Re-addressed care wishes with pt and Ball; we will honor her wishes for no intubation given stage III CA with complication after first chemotherapy treatment  Keep NPO with AVAPS support  12/20 -  failing aggressive NIPPV and has expressed desire not to be intubated; discussed with daughter, recommend transition to comfort care    Cardiac/Vascular  Essential hypertension  Hold antihypertensives  ICU hemodynamic monitoring    Renal/  Hypokalemia  IV potassium replacement    Acute cystitis without hematuria  On levaquin day 5 for e.coli UTI  Merrem vanco added 12/19    Hematology  Thrombocytopenia  No evidence of bleeding  Monitor  Conservative transfusion plan    Oncology  Malignant neoplasm of ovary, left  Post initial TAXOL/CARBOPLATIN treatment with neutropenia, mucositis, UTI admission and now concern for pneumonia with shock  Holding any immunosuppressing meds  -Poor prognosis without treatment and treatment cannot be resumed until acute illness resolved and improved strength to tolerate chemo  -palliative care is following; current plan and wishes reviewed and we will continue to treat aggressively short of intubation along with providing care updates and support for decision making  12/20 - discussed with oncology this morning, agrees with transition to comfort care    Endocrine  Moderate malnutrition  NPO until pulmonary status improved    Diabetes mellitus  SSI prn with monitoring for glucose control and prevention of insulin toxicity    Other  Shock  Adequate MAP via arterial line  I>>O since admission and concern for fluid overload  Will check echo  Check lactate and obtain blood cultures then Escalate abx  12/20 - pressor added overnight, lactate continues rising      DNR  Will transition to full comfort care once immediate family arrives, Palliative medicine to assist with comfort care and I will remain available as needed    Critical Care Time: 45 minutes  Critical secondary to respiratory failure, shock, impending cardiopulmonary arrest   Critical care was time spent personally by me on the following activities: development of treatment plan with patient or surrogate and bedside caregivers,  discussions with consultants, evaluation of patient's response to treatment, examination of patient, ordering and performing treatments and interventions, ordering and review of laboratory studies, ordering and review of radiographic studies, pulse oximetry, re-evaluation of patient's condition. This critical care time did not overlap with that of any other provider or involve time for any procedures.     TREE Asencio-BC  Critical Care Medicine  [facility]

## 2019-12-20 NOTE — ASSESSMENT & PLAN NOTE
Post initial TAXOL/CARBOPLATIN treatment with neutropenia, mucositis, UTI admission and now concern for pneumonia with shock  Holding any immunosuppressing meds  -Poor prognosis without treatment and treatment cannot be resumed until acute illness resolved and improved strength to tolerate chemo  -palliative care is following; current plan and wishes reviewed and we will continue to treat aggressively short of intubation along with providing care updates and support for decision making  12/20 - discussed with oncology this morning, agrees with transition to comfort care

## 2019-12-20 NOTE — CHAPLAIN
Responded to call from pt's nurse that pt had passed.  Provided support through presence and prayer.  Checked with pt's family before leaving they currently had no other needs.  Spiritual care remains available as needed.    Chaplain Brett Dubois M.Div., [facility]

## 2019-12-20 NOTE — SUBJECTIVE & OBJECTIVE
Interval History:  In ICU on bipap - tachypnea with pulmonary edema , currently on levophed due to hypotension with tachycardia - HR in the 130's.  Increased lactic acid. Unresponsive.  Family at bedside.  Comfort care initiated.  Pastoral care consulted.    Oncology Treatment Plan:   OP GYN PACLITAXEL CARBOPLATIN (AUC) WEEKLY    Medications:  Continuous Infusions:   norepinephrine bitartrate-D5W 0.04 mcg/kg/min (12/20/19 0642)     Scheduled Meds:   aspirin  325 mg Oral Daily    clopidogrel  75 mg Oral Daily    famotidine (PF)  20 mg Intravenous BID    fluticasone propionate  1 spray Each Nostril Daily    meropenem (MERREM) IVPB  500 mg Intravenous Q6H    potassium chloride in water  20 mEq Intravenous Once    simvastatin  20 mg Oral QHS    Banana bag   Intravenous Daily    vancomycin (VANCOCIN) IVPB  2,250 mg Intravenous Q24H     PRN Meds:albuterol-ipratropium, magic mouthwash (diphenhydrAMINE 12.5 mg/5 mL 20 mL, aluminum & magnesium hydroxide-simethicone (MYLANTA) 20 mL, lidocaine HCl 2% (XYLOCAINE) 20 mL) solution, morphine, nitroGLYCERIN, ondansetron, prochlorperazine, sodium chloride 0.9%     Review of Systems   Unable to perform ROS: Acuity of condition     Objective:     Vital Signs (Most Recent):  Temp: 98.4 °F (36.9 °C) (12/20/19 0715)  Pulse: (!) 137 (12/20/19 0824)  Resp: (!) 46 (12/20/19 0824)  BP: 118/68 (12/20/19 0804)  SpO2: (!) 92 % (12/20/19 0824) Vital Signs (24h Range):  Temp:  [97.5 °F (36.4 °C)-98.5 °F (36.9 °C)] 98.4 °F (36.9 °C)  Pulse:  [] 137  Resp:  [28-51] 46  SpO2:  [81 %-98 %] 92 %  BP: ()/() 118/68  Arterial Line BP: ()/(34-72) 120/54     Weight: 88.2 kg (194 lb 6.4 oz)  Body mass index is 32.35 kg/m².  Body surface area is 2.01 meters squared.      Intake/Output Summary (Last 24 hours) at 12/20/2019 0834  Last data filed at 12/20/2019 0600  Gross per 24 hour   Intake 3572.31 ml   Output 600 ml   Net 2972.31 ml       Physical Exam   Constitutional: She  appears well-developed. She appears toxic. She has a sickly appearance. She appears ill. She appears distressed. Face mask in place.   HENT:   Head: Normocephalic and atraumatic.   Cardiovascular: Tachycardia present.   Pulmonary/Chest: Accessory muscle usage present. Tachypnea noted. She is in respiratory distress. She has rales. She exhibits no tenderness.   Abdominal: Soft. Normal appearance.   Genitourinary:   Genitourinary Comments: U/a shows + nitrite, many bacteria   Musculoskeletal: Normal range of motion. She exhibits edema (generalized).   Neurological: She is unresponsive.   Skin: Skin is warm and dry. Bruising noted. She is not diaphoretic. There is pallor.   Nursing note and vitals reviewed.      Significant Labs:   CBC:   Recent Labs   Lab 12/19/19  0431 12/20/19  0331 12/20/19  0403   WBC 2.02* 10.30  --    HGB 11.6* 11.8*  --    HCT 34.9* 35.6* 33*   PLT 54* 63*  --    , CMP:   Recent Labs   Lab 12/19/19  0431 12/19/19  1834 12/20/19  0440    139 140   K 2.8* 3.4* 3.9    108 109   CO2 21* 17* 15*   * 264* 240*   BUN 20 29* 33*   CREATININE 0.7 0.8 1.0   CALCIUM 8.5* 8.5* 8.8   PROT  --   --  5.5*   ALBUMIN  --   --  1.7*   BILITOT  --   --  0.9   ALKPHOS  --   --  116   AST  --   --  45*   ALT  --   --  32   ANIONGAP 14 14 16   EGFRNONAA >60 >60 53*   , Coagulation: No results for input(s): PT, INR, APTT in the last 48 hours., LDH: No results for input(s): LDHCSF, BFSOURCE in the last 48 hours., LFTs:   Recent Labs   Lab 12/20/19  0440   ALT 32   AST 45*   ALKPHOS 116   BILITOT 0.9   PROT 5.5*   ALBUMIN 1.7*   , Reticulocytes: No results for input(s): RETIC in the last 48 hours., Tumor Markers: No results for input(s): PSA, CEA, , AFPTM, [id number],  in the last 48 hours.    Invalid input(s): ALGTM, Urine Studies:   No results for input(s): COLORU, APPEARANCEUA, PHUR, SPECGRAV, PROTEINUA, GLUCUA, KETONESU, BILIRUBINUA, OCCULTUA, NITRITE, UROBILINOGEN, LEUKOCYTESUR, RBCUA,  WBCUA, BACTERIA, SQUAMEPITHEL, HYALINECASTS in the last 48 hours.    Invalid input(s): WRIGHTSUR and All pertinent labs from the last 24 hours have been reviewed.    Diagnostic Results:  I have reviewed all pertinent imaging results/findings within the past 24 hours.

## 2019-12-20 NOTE — NURSING
eICU called due to pt becoming hypotensive. SBP 80s-90s with MAP in 50s.  Dr. Dupree entered order for 250 ml bolus of NS over 1 hour.  Will continue to monitor.

## 2019-12-23 NOTE — PHYSICIAN QUERY
PT Name: Jennifer Zhu  MR #: [id number]     Physician Query Form - Diagnosis Clarification      CDS/: Evette Wilson               Contact information:    This form is a permanent document in the medical record.     Query Date: December 23, 2019    By submitting this query, we are merely seeking further clarification of documentation.  Please utilize your independent clinical judgment when addressing the question(s) below.     The medical record contains the following:      Findings Supporting Clinical Information Location in Medical Record   Septic shock Called for hypotension.   The patient was admitted for neutropenic infection.   Septic shock.  Is on broad spectrum Abx.    Shock  Acute cystitis without hematuria     Acute hypoxemic respiratory failure  LLL infiltrate on chest film concerning for pneumonia, cannot rule out aspiration with ongoing mucositis with dysphagia and GERD in immunocompromised patient  --Keep NPO with AVAPS support   Post initial TAXOL/CARBOPLATIN treatment with neutropenia, mucositis, UTI admission and now concern for pneumonia with shock   On levaquin day 5 for e.coli UTI  Plan escalation of abx for pneumonia concern with respiratory failure and hypotension        bipap 10/5 @ 80% with hypotension per NIBP arterial line placed      Levophed gtt  NS @75cc/hr  LR 250ml Bolus  Levofloxacin IVPB    Indications of use: UTI  Meropenem IVPB     Indications of use:  Lower respiratory infections  Vancomycin IVPB    Indications of use:   Lower respiratory infections      BP= 58/43,  70/52  Temp= 100.1, 96.6  HR= 118, 113  RR= 28, 34   12/20 ICU MD PN          12/20 DC summary        12/19 Pulm consult                    12/20       12/20 MAR  12/16-20 MAR  12/19 MAR  12/15-19 MAR  12/19 MAR  12/19 MAR    12/18 Flowsheet     Please clarify if the Septic shock diagnosis has been:    [xx  ] Ruled In   [  ] Ruled In, Now Resolved   [  ] Ruled Out     [  ] Clinically undetermined     Please  document in your progress notes daily for the duration of treatment, until resolved, and include in your discharge summary.

## 2019-12-23 NOTE — PLAN OF CARE
19 0806   Final Note   Assessment Type Final Discharge Note   Anticipated Discharge Disposition    Right Care Referral Info   Post Acute Recommendation No Care  (Patient )

## 2019-12-23 NOTE — PHYSICIAN QUERY
PT Name: Jennifer Zhu  MR #: [id number]     Physician Query Form - Documentation Clarification      CDS/: Evette Wilson RN              Contact information: [phone]    This form is a permanent document in the medical record.     Query Date: December 23, 2019    By submitting this query, we are merely seeking further clarification of documentation. Please utilize your independent clinical judgment when addressing the question(s) below.    The Medical record reflects the following:    Supporting Clinical Findings Location in Medical Record   ...now concerning for pneumonia and shock    Acute hypoxemic respiratory failure  LLL infiltrate on chest film concerning for pneumonia, cannot rule out aspiration with ongoing mucositis with dysphagia and GERD in immunocompromised patient  --Keep NPO with AVAPS support    Post initial TAXOL/CARBOPLATIN treatment with neutropenia, mucositis, UTI admission and now concern for pneumonia with shock    On levaquin day 5 for e.coli UTI  Plan escalation of abx for pneumonia concern with respiratory failure and hypotension       12/19 Pulm consult   Associated symptoms include generalized weakness and slurred speech.     O2 sats 86% on 100% bipap   RR= 36   Temp 99.9   12/15 HP    12/20 Flowsheet     Patchy infiltrates are seen throughout the lungs bilaterally with more focal consolidation left lower lobe.  No large pleural effusion.  No pneumothorax.  Heart is enlarged but stable.  Sternotomy wires are noted.    In comparison to the prior study, there is no additional interval changes    Levofloxacin IVPB    Indications of use: UTI  Meropenem IVPB     Indications of use:  Lower respiratory infections  Vancomycin IVPB    Indications of use:   Lower respiratory infections   12/20 CXR          12/15-19 MAR  12/19 MAR  12/19 MAR                                                                              Doctor, Please specify diagnosis or diagnoses associated with above  clinical findings.    Provider Use Only      [   ] Gram negative Pneumonia    [   ] Aspiration Pneumonia    [xx   ] Pneumonia    [   ] Other____________________________________                                                                                                               [  ] Clinically Undetermined

## 2019-12-23 NOTE — PHYSICIAN QUERY
PT Name: Jennifer Zhu  MR #: [id number]     PHYSICIAN QUERY -  ACUITY OF CONDITION CLARIFICATION      CDS/: Evette Wilson RN             Contact information: [phone]  This form is a permanent document in the medical record.     Query Date: 2019    By submitting this query, we are merely seeking further clarification of documentation to reflect the severity of illness of your patient. Please utilize your independent clinical judgment when addressing the question(s) below.    The Medical record reflects the following:     Indicators   Supporting Clinical Findings Location in Medical Record   x Documentation of condition Last 24 hours declining O2 sats with abg confirmed hypoxia and concern for pulmonary edema requiring NRB and IV lasix  Being transferred to ICU today for continuous NIPPV      Patient with edema and rales today - getting IV fluids and furosemide was held on admit. 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     Pulmonary consult           DC summary    Lab Value(s)     x Radiology Findings Findings concerning for worsening pulmonary edema or multifocal airspace disease.    CXR   x Treatment                                 Medication Lasix 20mg IVP  Lasix 40mg IVP    CXR  Bipap  Pulse oximetry  Continuous oxygen  Cardiac monitor  Pulmonary consult     12/19 MAR  12/18 MAR      12/18 NSG orders    Other       Provider, please specify the acuity/chronicity of Pulmonary edema:    [xx   ] Acute   [   ] Chronic   [   ] Acute and/on chronic   [   ]  Clinically Undetermined       Please document in your progress notes daily for the duration of treatment until resolved, and include in your discharge summary.

## 2019-12-24 LAB
BACTERIA BLD CULT: NORMAL
BACTERIA BLD CULT: NORMAL

## 2023-12-03 NOTE — ASSESSMENT & PLAN NOTE
No evidence of bleeding  Monitor  Conservative transfusion plan   Nurse rounded on patient. Patient is comfortable, denies pain, has been repositioned, and has been offered toileting. Patient has been updated and all questions answered.
